# Patient Record
Sex: FEMALE | Race: WHITE | NOT HISPANIC OR LATINO | Employment: FULL TIME | URBAN - NONMETROPOLITAN AREA
[De-identification: names, ages, dates, MRNs, and addresses within clinical notes are randomized per-mention and may not be internally consistent; named-entity substitution may affect disease eponyms.]

---

## 2019-11-08 RX ORDER — LISINOPRIL 10 MG/1
TABLET ORAL
Qty: 180 TABLET | Refills: 3 | Status: SHIPPED | OUTPATIENT
Start: 2019-11-08 | End: 2020-11-02

## 2020-07-21 ENCOUNTER — OUTSIDE FACILITY SERVICE (OUTPATIENT)
Dept: CARDIOLOGY | Facility: CLINIC | Age: 60
End: 2020-07-21

## 2020-07-21 PROCEDURE — 99213 OFFICE O/P EST LOW 20 MIN: CPT | Performed by: INTERNAL MEDICINE

## 2020-07-21 PROCEDURE — 93010 ELECTROCARDIOGRAM REPORT: CPT | Performed by: INTERNAL MEDICINE

## 2020-07-29 ENCOUNTER — TELEPHONE (OUTPATIENT)
Dept: CARDIOLOGY | Facility: CLINIC | Age: 60
End: 2020-07-29

## 2020-09-29 RX ORDER — PYRIDOXINE HCL (VITAMIN B6) 50 MG
TABLET ORAL
COMMUNITY
End: 2020-11-17

## 2020-09-29 RX ORDER — PAROXETINE 10 MG/1
TABLET, FILM COATED ORAL
COMMUNITY
Start: 2019-02-12 | End: 2021-07-16 | Stop reason: DRUGHIGH

## 2020-10-06 ENCOUNTER — OUTSIDE FACILITY SERVICE (OUTPATIENT)
Dept: CARDIOLOGY | Facility: CLINIC | Age: 60
End: 2020-10-06

## 2020-10-06 PROCEDURE — 99213 OFFICE O/P EST LOW 20 MIN: CPT | Performed by: INTERNAL MEDICINE

## 2020-11-02 RX ORDER — LISINOPRIL 10 MG/1
TABLET ORAL
Qty: 180 TABLET | Refills: 3 | Status: SHIPPED | OUTPATIENT
Start: 2020-11-02 | End: 2021-09-29

## 2020-12-23 RX ORDER — HYDROCHLOROTHIAZIDE 12.5 MG/1
TABLET ORAL
Qty: 15 TABLET | Refills: 5 | Status: SHIPPED | OUTPATIENT
Start: 2020-12-23 | End: 2021-06-14

## 2021-01-19 ENCOUNTER — OUTSIDE FACILITY SERVICE (OUTPATIENT)
Dept: CARDIOLOGY | Facility: CLINIC | Age: 61
End: 2021-01-19

## 2021-01-19 PROCEDURE — 99214 OFFICE O/P EST MOD 30 MIN: CPT | Performed by: INTERNAL MEDICINE

## 2021-02-16 ENCOUNTER — LAB REQUISITION (OUTPATIENT)
Dept: LAB | Facility: HOSPITAL | Age: 61
End: 2021-02-16

## 2021-02-16 DIAGNOSIS — C76.50: ICD-10-CM

## 2021-02-16 PROCEDURE — 88305 TISSUE EXAM BY PATHOLOGIST: CPT | Performed by: SURGERY

## 2021-02-17 LAB
LAB AP CASE REPORT: NORMAL
LAB AP DIAGNOSIS COMMENT: NORMAL
PATH REPORT.FINAL DX SPEC: NORMAL
PATH REPORT.GROSS SPEC: NORMAL

## 2021-03-01 RX ORDER — PRAVASTATIN SODIUM 80 MG/1
80 TABLET ORAL DAILY
COMMUNITY
End: 2021-06-14

## 2021-03-01 RX ORDER — PRAVASTATIN SODIUM 40 MG
TABLET ORAL
Qty: 30 TABLET | Refills: 5 | Status: SHIPPED | OUTPATIENT
Start: 2021-03-01 | End: 2021-03-01 | Stop reason: DRUGHIGH

## 2021-03-01 NOTE — TELEPHONE ENCOUNTER
PATIENT WAS TAKEN OFF OF PRAVASTATIN 40MG BACK IN January APPT IN [place]  AND IT WAS INCREASTED TO 80MG

## 2021-06-14 RX ORDER — HYDROCHLOROTHIAZIDE 12.5 MG/1
TABLET ORAL
Qty: 15 TABLET | Refills: 5 | Status: SHIPPED | OUTPATIENT
Start: 2021-06-14 | End: 2021-11-09

## 2021-06-14 RX ORDER — PRAVASTATIN SODIUM 80 MG/1
TABLET ORAL
Qty: 30 TABLET | Refills: 5 | Status: SHIPPED | OUTPATIENT
Start: 2021-06-14 | End: 2021-11-09

## 2021-07-13 ENCOUNTER — OUTSIDE FACILITY SERVICE (OUTPATIENT)
Dept: CARDIOLOGY | Facility: CLINIC | Age: 61
End: 2021-07-13

## 2021-07-13 PROCEDURE — 99214 OFFICE O/P EST MOD 30 MIN: CPT | Performed by: INTERNAL MEDICINE

## 2021-07-16 RX ORDER — PAROXETINE HYDROCHLORIDE 20 MG/1
1 TABLET, FILM COATED ORAL DAILY
COMMUNITY

## 2021-09-29 RX ORDER — LISINOPRIL 10 MG/1
TABLET ORAL
Qty: 180 TABLET | Refills: 3 | Status: SHIPPED | OUTPATIENT
Start: 2021-09-29 | End: 2021-11-09

## 2021-09-29 NOTE — TELEPHONE ENCOUNTER
Rx Refill Note  Requested Prescriptions     Pending Prescriptions Disp Refills   • lisinopril (PRINIVIL,ZESTRIL) 10 MG tablet [Pharmacy Med Name: lisinopril 10 mg tablet] 180 tablet 3     Sig: TAKE ONE TABLET BY MOUTH TWICE DAILY      Last office visit with prescribing clinician: 07/13/21     Next office visit with prescribing clinician: 11/09/2021     SCANNED - LABS (06/26/2021)         Camelia Barreto MA  09/29/21, 08:43 EDT

## 2021-11-09 ENCOUNTER — OUTSIDE FACILITY SERVICE (OUTPATIENT)
Dept: CARDIOLOGY | Facility: CLINIC | Age: 61
End: 2021-11-09

## 2021-11-09 PROCEDURE — 99214 OFFICE O/P EST MOD 30 MIN: CPT | Performed by: INTERNAL MEDICINE

## 2021-11-09 RX ORDER — LISINOPRIL AND HYDROCHLOROTHIAZIDE 12.5; 1 MG/1; MG/1
1 TABLET ORAL DAILY
Qty: 90 TABLET | Refills: 3 | Status: SHIPPED | OUTPATIENT
Start: 2021-11-09 | End: 2021-11-09

## 2021-11-09 RX ORDER — ROSUVASTATIN CALCIUM 20 MG/1
20 TABLET, COATED ORAL DAILY
Qty: 90 TABLET | Refills: 3 | Status: SHIPPED | OUTPATIENT
Start: 2021-11-09 | End: 2021-11-16

## 2021-11-09 RX ORDER — LISINOPRIL AND HYDROCHLOROTHIAZIDE 20; 12.5 MG/1; MG/1
1 TABLET ORAL DAILY
Qty: 90 TABLET | Refills: 3 | Status: SHIPPED | OUTPATIENT
Start: 2021-11-09 | End: 2022-10-03

## 2021-11-10 ENCOUNTER — TRANSCRIBE ORDERS (OUTPATIENT)
Dept: ADMINISTRATIVE | Facility: HOSPITAL | Age: 61
End: 2021-11-10

## 2021-11-10 DIAGNOSIS — Z13.9 SCREENING DUE: Primary | ICD-10-CM

## 2021-11-16 RX ORDER — HYDROCHLOROTHIAZIDE 12.5 MG/1
12.5 CAPSULE, GELATIN COATED ORAL DAILY
COMMUNITY
End: 2022-11-08 | Stop reason: SDUPTHER

## 2021-11-16 RX ORDER — ZINC GLUCONATE 50 MG
50 TABLET ORAL DAILY
COMMUNITY
End: 2022-11-15

## 2021-11-26 ENCOUNTER — HOSPITAL ENCOUNTER (OUTPATIENT)
Dept: CT IMAGING | Facility: HOSPITAL | Age: 61
Discharge: HOME | End: 2021-11-26

## 2021-11-26 ENCOUNTER — HOSPITAL ENCOUNTER (OUTPATIENT)
Dept: CARDIOLOGY | Facility: HOSPITAL | Age: 61
Discharge: HOME | End: 2021-11-26

## 2021-11-26 DIAGNOSIS — Z13.9 SCREENING DUE: ICD-10-CM

## 2021-11-26 LAB
BH CV XLRA MEAS - MID AO DIAM: 1.7 CM
BH CV XLRA MEAS - PAD LEFT ABI PT: 1.2
BH CV XLRA MEAS - PAD LEFT ARM: 111 MMHG
BH CV XLRA MEAS - PAD LEFT LEG PT: 146 MMHG
BH CV XLRA MEAS - PAD RIGHT ABI PT: 1.3
BH CV XLRA MEAS - PAD RIGHT ARM: 122 MMHG
BH CV XLRA MEAS - PAD RIGHT LEG PT: 158 MMHG
BH CV XLRA MEAS LEFT DIST CCA PSV: -90.9 CM/SEC
BH CV XLRA MEAS LEFT ICA/CCA RATIO: 0.6
BH CV XLRA MEAS LEFT MID CCA PSV: 91 CM/SEC
BH CV XLRA MEAS LEFT MID ICA PSV: 55 CM/SEC
BH CV XLRA MEAS LEFT PROX ICA PSV: -55.3 CM/SEC
BH CV XLRA MEAS RIGHT DIST CCA PSV: -67.2 CM/SEC
BH CV XLRA MEAS RIGHT ICA/CCA RATIO: 0.9
BH CV XLRA MEAS RIGHT MID CCA PSV: 67 CM/SEC
BH CV XLRA MEAS RIGHT MID ICA PSV: 60 CM/SEC
BH CV XLRA MEAS RIGHT PROX ICA PSV: -59.7 CM/SEC
MAXIMAL PREDICTED HEART RATE: 161 BPM
STRESS TARGET HR: 137 BPM

## 2021-11-26 PROCEDURE — 75571 CT HRT W/O DYE W/CA TEST: CPT

## 2021-11-26 PROCEDURE — 93799 UNLISTED CV SVC/PROCEDURE: CPT

## 2021-11-29 ENCOUNTER — TELEPHONE (OUTPATIENT)
Dept: CARDIOLOGY | Facility: CLINIC | Age: 61
End: 2021-11-29

## 2021-11-29 NOTE — TELEPHONE ENCOUNTER
Called and notified patient, she verbalized understanding.    --------------------  Royal Gonzales MD Trent, Melissa, MA    Coronary artery calcium score looks okay/very low number

## 2021-11-29 NOTE — TELEPHONE ENCOUNTER
Called and notified patient, she verbalized understanding.    ---------------------    Royal Gonzales MD Trent, Melissa, MA    Mild plaque involving the left internal carotid artery otherwise normal screening vascular ultrasound study

## 2022-01-17 RX ORDER — PRAVASTATIN SODIUM 80 MG/1
80 TABLET ORAL DAILY
Qty: 90 TABLET | Refills: 1 | Status: SHIPPED | OUTPATIENT
Start: 2022-01-17 | End: 2022-06-17

## 2022-06-17 RX ORDER — PRAVASTATIN SODIUM 80 MG/1
TABLET ORAL
Qty: 90 TABLET | Refills: 1 | Status: SHIPPED | OUTPATIENT
Start: 2022-06-17 | End: 2022-11-08 | Stop reason: SDUPTHER

## 2022-10-03 RX ORDER — LISINOPRIL AND HYDROCHLOROTHIAZIDE 20; 12.5 MG/1; MG/1
TABLET ORAL
Qty: 90 TABLET | Refills: 3 | Status: SHIPPED | OUTPATIENT
Start: 2022-10-03 | End: 2022-11-08 | Stop reason: SDUPTHER

## 2022-11-08 ENCOUNTER — OUTSIDE FACILITY SERVICE (OUTPATIENT)
Dept: CARDIOLOGY | Facility: CLINIC | Age: 62
End: 2022-11-08

## 2022-11-08 PROCEDURE — 99214 OFFICE O/P EST MOD 30 MIN: CPT | Performed by: INTERNAL MEDICINE

## 2022-11-08 RX ORDER — HYDROCHLOROTHIAZIDE 12.5 MG/1
12.5 CAPSULE, GELATIN COATED ORAL DAILY
Qty: 90 CAPSULE | Refills: 3 | Status: SHIPPED | OUTPATIENT
Start: 2022-11-08 | End: 2022-11-14

## 2022-11-08 RX ORDER — PRAVASTATIN SODIUM 80 MG/1
80 TABLET ORAL DAILY
Qty: 90 TABLET | Refills: 3 | Status: SHIPPED | OUTPATIENT
Start: 2022-11-08 | End: 2022-12-19

## 2022-11-08 RX ORDER — LISINOPRIL AND HYDROCHLOROTHIAZIDE 20; 12.5 MG/1; MG/1
1 TABLET ORAL DAILY
Qty: 90 TABLET | Refills: 3 | Status: SHIPPED | OUTPATIENT
Start: 2022-11-08

## 2022-11-14 RX ORDER — HYDROCHLOROTHIAZIDE 12.5 MG/1
12.5 CAPSULE, GELATIN COATED ORAL DAILY
Qty: 90 CAPSULE | Refills: 3 | Status: SHIPPED | OUTPATIENT
Start: 2022-11-14 | End: 2022-12-08

## 2022-11-15 RX ORDER — LANOLIN ALCOHOL/MO/W.PET/CERES
1000 CREAM (GRAM) TOPICAL DAILY
COMMUNITY

## 2022-12-08 ENCOUNTER — TELEPHONE (OUTPATIENT)
Dept: CARDIOLOGY | Facility: CLINIC | Age: 62
End: 2022-12-08

## 2022-12-08 NOTE — TELEPHONE ENCOUNTER
PT CALLED IN SAYING SHE JUST RECEIVED A NEW MEDCIATION WITHOUT BEING WARNED ABOUT IT. IT WAS CALLED HYDROCHLOROTHIAZIDE.     PLEASE CALL PT BACK  [phone]

## 2022-12-08 NOTE — TELEPHONE ENCOUNTER
Royal Gonzales MD Trent, Melissa, MA    I already spoke to the patient we dont need to call   We will discontinue hydrochlorothiazide   Continue lisinopril hydrochlorothiazide 20 x 12.5 mg p.o. once a day

## 2022-12-19 RX ORDER — PRAVASTATIN SODIUM 80 MG/1
TABLET ORAL
Qty: 90 TABLET | Refills: 1 | Status: SHIPPED | OUTPATIENT
Start: 2022-12-19

## 2023-06-09 RX ORDER — PRAVASTATIN SODIUM 80 MG/1
TABLET ORAL
Qty: 90 TABLET | Refills: 1 | Status: SHIPPED | OUTPATIENT
Start: 2023-06-09

## 2023-08-09 ENCOUNTER — APPOINTMENT (OUTPATIENT)
Dept: GENERAL RADIOLOGY | Facility: HOSPITAL | Age: 63
DRG: 236 | End: 2023-08-09
Payer: COMMERCIAL

## 2023-08-09 ENCOUNTER — APPOINTMENT (OUTPATIENT)
Dept: RESPIRATORY THERAPY | Facility: HOSPITAL | Age: 63
DRG: 236 | End: 2023-08-09
Payer: COMMERCIAL

## 2023-08-09 ENCOUNTER — APPOINTMENT (OUTPATIENT)
Dept: CARDIOLOGY | Facility: HOSPITAL | Age: 63
DRG: 236 | End: 2023-08-09
Payer: COMMERCIAL

## 2023-08-09 ENCOUNTER — HOSPITAL ENCOUNTER (INPATIENT)
Facility: HOSPITAL | Age: 63
LOS: 6 days | Discharge: HOME | DRG: 236 | End: 2023-08-15
Attending: THORACIC SURGERY (CARDIOTHORACIC VASCULAR SURGERY)
Payer: COMMERCIAL

## 2023-08-09 DIAGNOSIS — I25.110 CORONARY ARTERY DISEASE INVOLVING NATIVE CORONARY ARTERY OF NATIVE HEART WITH UNSTABLE ANGINA PECTORIS: Primary | ICD-10-CM

## 2023-08-09 DIAGNOSIS — J96.01 ACUTE RESPIRATORY FAILURE WITH HYPOXIA: ICD-10-CM

## 2023-08-09 LAB
ABO GROUP BLD: NORMAL
ALBUMIN SERPL-MCNC: 3.9 G/DL (ref 3.5–5.2)
ALBUMIN/GLOB SERPL: 1.5 G/DL
ALP SERPL-CCNC: 83 U/L (ref 39–117)
ALT SERPL W P-5'-P-CCNC: 30 U/L (ref 1–33)
ANION GAP SERPL CALCULATED.3IONS-SCNC: 11 MMOL/L (ref 5–15)
APTT PPP: 28.2 SECONDS (ref 24–31)
APTT PPP: 35.9 SECONDS (ref 61–76.5)
AST SERPL-CCNC: 24 U/L (ref 1–32)
BACTERIA UR QL AUTO: ABNORMAL /HPF
BASOPHILS # BLD AUTO: 0 10*3/MM3 (ref 0–0.2)
BASOPHILS NFR BLD AUTO: 0.2 % (ref 0–1.5)
BH CV XLRA MEAS - DIST GSV CALF DIST LEFT: 0.23 CM
BH CV XLRA MEAS - DIST GSV CALF DIST RIGHT: 0.26 CM
BH CV XLRA MEAS - DIST GSV THIGH DIST LEFT: 0.3 CM
BH CV XLRA MEAS - DIST GSV THIGH DIST RIGHT: 0.33 CM
BH CV XLRA MEAS - GSV ANKLE DIST LEFT: 0.26 CM
BH CV XLRA MEAS - MID GSV CALF LEFT: 0.23 CM
BH CV XLRA MEAS - MID GSV CALF RIGHT: 0.21 CM
BH CV XLRA MEAS - MID GSV THIGH  LEFT: 0.53 CM
BH CV XLRA MEAS - MID GSV THIGH  RIGHT: 0.35 CM
BH CV XLRA MEAS - PROX GSV CALF DIST LEFT: 0.24 CM
BH CV XLRA MEAS - PROX GSV CALF DIST RIGHT: 0.39 CM
BH CV XLRA MEAS - PROX GSV THIGH  LEFT: 0.85 CM
BH CV XLRA MEAS - PROX GSV THIGH  RIGHT: 0.62 CM
BH CV XLRA MEAS LEFT DIST CCA EDV: -17.1 CM/SEC
BH CV XLRA MEAS LEFT DIST CCA PSV: -85.6 CM/SEC
BH CV XLRA MEAS LEFT DIST ICA EDV: -27.8 CM/SEC
BH CV XLRA MEAS LEFT DIST ICA PSV: -82.9 CM/SEC
BH CV XLRA MEAS LEFT ICA/CCA RATIO: 0.97
BH CV XLRA MEAS LEFT PROX CCA EDV: 23.6 CM/SEC
BH CV XLRA MEAS LEFT PROX CCA PSV: 103 CM/SEC
BH CV XLRA MEAS LEFT PROX ECA EDV: -15.2 CM/SEC
BH CV XLRA MEAS LEFT PROX ECA PSV: -76.7 CM/SEC
BH CV XLRA MEAS LEFT PROX ICA EDV: -22.6 CM/SEC
BH CV XLRA MEAS LEFT PROX ICA PSV: -59.9 CM/SEC
BH CV XLRA MEAS LEFT PROX SCLA PSV: -88 CM/SEC
BH CV XLRA MEAS LEFT VERTEBRAL A EDV: -14.1 CM/SEC
BH CV XLRA MEAS LEFT VERTEBRAL A PSV: -47 CM/SEC
BH CV XLRA MEAS RIGHT DIST CCA EDV: -17.9 CM/SEC
BH CV XLRA MEAS RIGHT DIST CCA PSV: -60.2 CM/SEC
BH CV XLRA MEAS RIGHT DIST ICA EDV: -20 CM/SEC
BH CV XLRA MEAS RIGHT DIST ICA PSV: -50.8 CM/SEC
BH CV XLRA MEAS RIGHT ICA/CCA RATIO: 0.87
BH CV XLRA MEAS RIGHT PROX CCA EDV: -16.5 CM/SEC
BH CV XLRA MEAS RIGHT PROX CCA PSV: -80.7 CM/SEC
BH CV XLRA MEAS RIGHT PROX ECA EDV: -14.9 CM/SEC
BH CV XLRA MEAS RIGHT PROX ECA PSV: -69 CM/SEC
BH CV XLRA MEAS RIGHT PROX ICA EDV: -19.7 CM/SEC
BH CV XLRA MEAS RIGHT PROX ICA PSV: -52.3 CM/SEC
BH CV XLRA MEAS RIGHT PROX SCLA PSV: -110 CM/SEC
BH CV XLRA MEAS RIGHT VERTEBRAL A EDV: -12.3 CM/SEC
BH CV XLRA MEAS RIGHT VERTEBRAL A PSV: -39.5 CM/SEC
BILIRUB SERPL-MCNC: 0.5 MG/DL (ref 0–1.2)
BILIRUB UR QL STRIP: NEGATIVE
BLD GP AB SCN SERPL QL: NEGATIVE
BUN SERPL-MCNC: 15 MG/DL (ref 8–23)
BUN/CREAT SERPL: 23.1 (ref 7–25)
CALCIUM SPEC-SCNC: 9.4 MG/DL (ref 8.6–10.5)
CHLORIDE SERPL-SCNC: 106 MMOL/L (ref 98–107)
CLARITY UR: ABNORMAL
CLOSE TME COLL+ADP + EPINEP PNL BLD: 97 % (ref 86–100)
CO2 SERPL-SCNC: 24 MMOL/L (ref 22–29)
COLOR UR: ABNORMAL
CREAT SERPL-MCNC: 0.65 MG/DL (ref 0.57–1)
DEPRECATED RDW RBC AUTO: 43.3 FL (ref 37–54)
EGFRCR SERPLBLD CKD-EPI 2021: 100.9 ML/MIN/1.73
EOSINOPHIL # BLD AUTO: 0.1 10*3/MM3 (ref 0–0.4)
EOSINOPHIL NFR BLD AUTO: 2.4 % (ref 0.3–6.2)
ERYTHROCYTE [DISTWIDTH] IN BLOOD BY AUTOMATED COUNT: 13.5 % (ref 12.3–15.4)
GLOBULIN UR ELPH-MCNC: 2.6 GM/DL
GLUCOSE SERPL-MCNC: 96 MG/DL (ref 65–99)
GLUCOSE UR STRIP-MCNC: NEGATIVE MG/DL
HCT VFR BLD AUTO: 43.4 % (ref 34–46.6)
HGB BLD-MCNC: 14.6 G/DL (ref 12–15.9)
HGB UR QL STRIP.AUTO: NEGATIVE
HYALINE CASTS UR QL AUTO: ABNORMAL /LPF
INR PPP: 0.94 (ref 0.93–1.1)
KETONES UR QL STRIP: NEGATIVE
LEUKOCYTE ESTERASE UR QL STRIP.AUTO: ABNORMAL
LYMPHOCYTES # BLD AUTO: 2 10*3/MM3 (ref 0.7–3.1)
LYMPHOCYTES NFR BLD AUTO: 33.5 % (ref 19.6–45.3)
MCH RBC QN AUTO: 30.8 PG (ref 26.6–33)
MCHC RBC AUTO-ENTMCNC: 33.6 G/DL (ref 31.5–35.7)
MCV RBC AUTO: 91.8 FL (ref 79–97)
MONOCYTES # BLD AUTO: 0.5 10*3/MM3 (ref 0.1–0.9)
MONOCYTES NFR BLD AUTO: 8.7 % (ref 5–12)
MRSA DNA SPEC QL NAA+PROBE: NORMAL
NEUTROPHILS NFR BLD AUTO: 3.3 10*3/MM3 (ref 1.7–7)
NEUTROPHILS NFR BLD AUTO: 55.2 % (ref 42.7–76)
NITRITE UR QL STRIP: NEGATIVE
NRBC BLD AUTO-RTO: 0.1 /100 WBC (ref 0–0.2)
PH UR STRIP.AUTO: 7 [PH] (ref 5–8)
PLATELET # BLD AUTO: 187 10*3/MM3 (ref 140–450)
PMV BLD AUTO: 8.1 FL (ref 6–12)
POTASSIUM SERPL-SCNC: 3.8 MMOL/L (ref 3.5–5.2)
PROT SERPL-MCNC: 6.5 G/DL (ref 6–8.5)
PROT UR QL STRIP: NEGATIVE
PROTHROMBIN TIME: 10.1 SECONDS (ref 9.6–11.7)
RBC # BLD AUTO: 4.73 10*6/MM3 (ref 3.77–5.28)
RBC # UR STRIP: ABNORMAL /HPF
REF LAB TEST METHOD: ABNORMAL
RH BLD: POSITIVE
RIGHT ARM BP: NORMAL MMHG
SARS-COV-2 RNA RESP QL NAA+PROBE: NOT DETECTED
SODIUM SERPL-SCNC: 141 MMOL/L (ref 136–145)
SP GR UR STRIP: 1.01 (ref 1–1.03)
SQUAMOUS #/AREA URNS HPF: ABNORMAL /HPF
T&S EXPIRATION DATE: NORMAL
UROBILINOGEN UR QL STRIP: ABNORMAL
WBC # UR STRIP: ABNORMAL /HPF
WBC NRBC COR # BLD: 6 10*3/MM3 (ref 3.4–10.8)

## 2023-08-09 PROCEDURE — 85576 BLOOD PLATELET AGGREGATION: CPT

## 2023-08-09 PROCEDURE — 94729 DIFFUSING CAPACITY: CPT

## 2023-08-09 PROCEDURE — 80053 COMPREHEN METABOLIC PANEL: CPT

## 2023-08-09 PROCEDURE — 86901 BLOOD TYPING SEROLOGIC RH(D): CPT

## 2023-08-09 PROCEDURE — 71045 X-RAY EXAM CHEST 1 VIEW: CPT

## 2023-08-09 PROCEDURE — 86900 BLOOD TYPING SEROLOGIC ABO: CPT

## 2023-08-09 PROCEDURE — 85730 THROMBOPLASTIN TIME PARTIAL: CPT

## 2023-08-09 PROCEDURE — 93970 EXTREMITY STUDY: CPT

## 2023-08-09 PROCEDURE — 93880 EXTRACRANIAL BILAT STUDY: CPT

## 2023-08-09 PROCEDURE — 86850 RBC ANTIBODY SCREEN: CPT

## 2023-08-09 PROCEDURE — 25010000002 HEPARIN (PORCINE) 25000-0.45 UT/250ML-% SOLUTION

## 2023-08-09 PROCEDURE — 86923 COMPATIBILITY TEST ELECTRIC: CPT

## 2023-08-09 PROCEDURE — 87635 SARS-COV-2 COVID-19 AMP PRB: CPT

## 2023-08-09 PROCEDURE — 83036 HEMOGLOBIN GLYCOSYLATED A1C: CPT

## 2023-08-09 PROCEDURE — 87641 MR-STAPH DNA AMP PROBE: CPT

## 2023-08-09 PROCEDURE — 94727 GAS DIL/WSHOT DETER LNG VOL: CPT

## 2023-08-09 PROCEDURE — 81001 URINALYSIS AUTO W/SCOPE: CPT

## 2023-08-09 PROCEDURE — 85610 PROTHROMBIN TIME: CPT

## 2023-08-09 PROCEDURE — 93005 ELECTROCARDIOGRAM TRACING: CPT

## 2023-08-09 PROCEDURE — 94010 BREATHING CAPACITY TEST: CPT

## 2023-08-09 PROCEDURE — 85025 COMPLETE CBC W/AUTO DIFF WBC: CPT

## 2023-08-09 RX ORDER — CHLORAL HYDRATE 500 MG
1000 CAPSULE ORAL NIGHTLY
COMMUNITY

## 2023-08-09 RX ORDER — SODIUM CHLORIDE 9 MG/ML
30 INJECTION, SOLUTION INTRAVENOUS CONTINUOUS PRN
Status: DISCONTINUED | OUTPATIENT
Start: 2023-08-09 | End: 2023-08-11

## 2023-08-09 RX ORDER — PAROXETINE HYDROCHLORIDE 20 MG/1
20 TABLET, FILM COATED ORAL DAILY
Status: DISCONTINUED | OUTPATIENT
Start: 2023-08-09 | End: 2023-08-11

## 2023-08-09 RX ORDER — CHLORHEXIDINE GLUCONATE 0.12 MG/ML
15 RINSE ORAL EVERY 12 HOURS SCHEDULED
Status: DISCONTINUED | OUTPATIENT
Start: 2023-08-09 | End: 2023-08-11

## 2023-08-09 RX ORDER — ALPRAZOLAM 0.25 MG/1
0.25 TABLET ORAL EVERY 8 HOURS PRN
Status: DISCONTINUED | OUTPATIENT
Start: 2023-08-09 | End: 2023-08-11

## 2023-08-09 RX ORDER — ONDANSETRON 2 MG/ML
4 INJECTION INTRAMUSCULAR; INTRAVENOUS EVERY 6 HOURS PRN
Status: DISCONTINUED | OUTPATIENT
Start: 2023-08-09 | End: 2023-08-11

## 2023-08-09 RX ORDER — DEXTROSE MONOHYDRATE 25 G/50ML
10-50 INJECTION, SOLUTION INTRAVENOUS
Status: DISCONTINUED | OUTPATIENT
Start: 2023-08-09 | End: 2023-08-11 | Stop reason: HOSPADM

## 2023-08-09 RX ORDER — ASPIRIN 81 MG/1
81 TABLET ORAL DAILY
Status: DISCONTINUED | OUTPATIENT
Start: 2023-08-10 | End: 2023-08-11

## 2023-08-09 RX ORDER — HEPARIN SODIUM 10000 [USP'U]/100ML
12 INJECTION, SOLUTION INTRAVENOUS
Status: DISCONTINUED | OUTPATIENT
Start: 2023-08-09 | End: 2023-08-11

## 2023-08-09 RX ORDER — CHLORHEXIDINE GLUCONATE 500 MG/1
1 CLOTH TOPICAL EVERY 12 HOURS
Status: DISCONTINUED | OUTPATIENT
Start: 2023-08-09 | End: 2023-08-09

## 2023-08-09 RX ORDER — CHLORHEXIDINE GLUCONATE 500 MG/1
1 CLOTH TOPICAL EVERY 12 HOURS
Status: DISCONTINUED | OUTPATIENT
Start: 2023-08-09 | End: 2023-08-11

## 2023-08-09 RX ORDER — ATORVASTATIN CALCIUM 40 MG/1
40 TABLET, FILM COATED ORAL NIGHTLY
Status: DISCONTINUED | OUTPATIENT
Start: 2023-08-09 | End: 2023-08-11

## 2023-08-09 RX ORDER — ACETAMINOPHEN 325 MG/1
650 TABLET ORAL EVERY 6 HOURS PRN
Status: DISCONTINUED | OUTPATIENT
Start: 2023-08-09 | End: 2023-08-11

## 2023-08-09 RX ORDER — NICOTINE POLACRILEX 4 MG
15 LOZENGE BUCCAL
Status: DISCONTINUED | OUTPATIENT
Start: 2023-08-09 | End: 2023-08-11 | Stop reason: HOSPADM

## 2023-08-09 RX ORDER — MORPHINE SULFATE 2 MG/ML
2 INJECTION, SOLUTION INTRAMUSCULAR; INTRAVENOUS
Status: DISCONTINUED | OUTPATIENT
Start: 2023-08-09 | End: 2023-08-11

## 2023-08-09 RX ORDER — CHLORHEXIDINE GLUCONATE 0.12 MG/ML
15 RINSE ORAL EVERY 12 HOURS SCHEDULED
Status: DISCONTINUED | OUTPATIENT
Start: 2023-08-09 | End: 2023-08-09

## 2023-08-09 RX ORDER — SODIUM CHLORIDE 0.9 % (FLUSH) 0.9 %
30 SYRINGE (ML) INJECTION ONCE AS NEEDED
Status: DISCONTINUED | OUTPATIENT
Start: 2023-08-09 | End: 2023-08-11 | Stop reason: HOSPADM

## 2023-08-09 RX ORDER — IBUPROFEN 600 MG/1
1 TABLET ORAL
Status: DISCONTINUED | OUTPATIENT
Start: 2023-08-09 | End: 2023-08-11 | Stop reason: HOSPADM

## 2023-08-09 RX ADMIN — ATORVASTATIN CALCIUM 40 MG: 40 TABLET, FILM COATED ORAL at 20:13

## 2023-08-09 RX ADMIN — HEPARIN SODIUM 12 UNITS/KG/HR: 10000 INJECTION, SOLUTION INTRAVENOUS at 15:56

## 2023-08-09 RX ADMIN — PAROXETINE HYDROCHLORIDE 20 MG: 20 TABLET, FILM COATED ORAL at 17:31

## 2023-08-09 NOTE — PROGRESS NOTES
Plans for CABG Friday morning 0730  Pt and family agreeable  PFTs ordered  Dr. Clements met with pt/family.

## 2023-08-09 NOTE — H&P
Name: Sara Chaves ADMIT: 2023   : 1960  PCP: Provider, No Known    MRN: [id number] LOS: 0 days   AGE/SEX: 62 y.o. female  ROOM: /     Chief complaint: Chest pain; MV disease; preop     Subjective : Denies chest pain    HPI:  Patient is a 62 y.o. female presents today from [facility] where she presented with chest discomfort progressively worsening over the past several weeks. Further history includes HTN, HLD, and tobacco abuse. She denies other medical problems. She had a severe episode of chest pain with associated diaphoresis, nausea, and vomiting which brought her to the emergency department.  Today she underwent LHC and was found to have severe three-vessel CAD with moderate LAD stenosis with IFR of the LAD of 0.87, D1 with 90% stenosis, LCX with 100% occluded in the mid segment with left to left collaterals filling the OMs, 70% mRCA and 99% dRCA stenosis. She had an echocardiogram which showed preserved LV function with an LVEF of 60% with grade 1 diastolic dysfunction and no evidence of valvular dysfunction. She was referred to CTS by Dr. Salcido and brought to [facility] for further workup and management.     No past medical history on file.  No past surgical history on file.  No family history on file.     Medications Prior to Admission   Medication Sig Dispense Refill Last Dose    ASPIRIN 81 PO Daily.       lisinopril-hydrochlorothiazide (PRINZIDE,ZESTORETIC) 20-12.5 MG per tablet Take 1 tablet by mouth Daily. 90 tablet 3     PARoxetine (PAXIL) 20 MG tablet Take 1 tablet by mouth Daily.       pravastatin (PRAVACHOL) 80 MG tablet TAKE ONE TABLET BY MOUTH EVERY DAY 90 tablet 1     vitamin B-12 (CYANOCOBALAMIN) 1000 MCG tablet Take 1 tablet by mouth Daily.        Allergies:  Patient has no allergy information on record.    Review of Systems   Constitutional:  Positive for activity change.   HENT:  Negative for dental problem.    Respiratory:  Positive for chest tightness and shortness of  breath.    Cardiovascular:  Positive for chest pain. Negative for palpitations.   Gastrointestinal: Negative.    Genitourinary: Negative.    Neurological:  Positive for dizziness.   Psychiatric/Behavioral:  The patient is nervous/anxious.       Objective    Vital Signs  Temp:  [97.5 øF (36.4 øC)] 97.5 øF (36.4 øC)  Heart Rate:  [61] 61  Resp:  [18] 18  BP: (135)/(82) 135/82  SpO2:  [97 %] 97 %  on   ;   Device (Oxygen Therapy): room air  Body mass index is 33.47 kg/mý.    Physical Exam  Constitutional:       Appearance: She is obese.   HENT:      Head: Normocephalic.      Mouth/Throat:      Mouth: Mucous membranes are moist. Mucous membranes are dry.   Eyes:      Pupils: Pupils are equal, round, and reactive to light.   Cardiovascular:      Rate and Rhythm: Normal rate and regular rhythm.   Abdominal:      General: Bowel sounds are normal. There is no distension.   Musculoskeletal:         General: No swelling. Normal range of motion.   Skin:     General: Skin is warm and dry.      Capillary Refill: Capillary refill takes 2 to 3 seconds.   Neurological:      Mental Status: She is alert and oriented to person, place, and time.   Psychiatric:         Mood and Affect: Mood normal.     Results Review:  I reviewed the patient's new clinical results.    Assessment & Plan    -NSTEMI with severe MV CAD--heparin gtt ordered  -HTN--on lisinopirl/hctz PTA--holding  -HLD--ASA/statin  -Tobacco abuse--PFTs ordered     Dr. Clements will review the films and provide his surgical recommendations. Questions answered with verbalized understanding. Preop studies ordered. She has not taken her vitamin d in months and has not had fish oil for 3 days. Hold heparin gtt on call to OR.    I discussed the patients findings and my recommendations with patient and family.    Zayda De Santiago, CELY  08/09/23  14:16 EDT

## 2023-08-10 ENCOUNTER — ANESTHESIA EVENT (OUTPATIENT)
Dept: PERIOP | Facility: HOSPITAL | Age: 63
DRG: 236 | End: 2023-08-10
Payer: COMMERCIAL

## 2023-08-10 LAB
APTT PPP: 46 SECONDS (ref 61–76.5)
APTT PPP: 51.6 SECONDS (ref 61–76.5)
APTT PPP: 52.7 SECONDS (ref 61–76.5)
ARTERIAL PATENCY WRIST A: POSITIVE
ATMOSPHERIC PRESS: ABNORMAL MM[HG]
BASE EXCESS BLDA CALC-SCNC: 1.1 MMOL/L (ref 0–3)
BDY SITE: ABNORMAL
CO2 BLDA-SCNC: 27.7 MMOL/L (ref 22–29)
HBA1C MFR BLD: 5.1 % (ref 4.8–5.6)
HCO3 BLDA-SCNC: 26.4 MMOL/L (ref 21–28)
HEMODILUTION: NO
INHALED O2 CONCENTRATION: 21 %
MODALITY: ABNORMAL
PCO2 BLDA: 43.3 MM HG (ref 35–48)
PH BLDA: 7.39 PH UNITS (ref 7.35–7.45)
PO2 BLDA: 75.3 MM HG (ref 83–108)
SAO2 % BLDCOA: 94.8 % (ref 94–98)

## 2023-08-10 PROCEDURE — 33533 CABG ARTERIAL SINGLE: CPT | Performed by: SPECIALIST/TECHNOLOGIST, OTHER

## 2023-08-10 PROCEDURE — 36600 WITHDRAWAL OF ARTERIAL BLOOD: CPT

## 2023-08-10 PROCEDURE — 99024 POSTOP FOLLOW-UP VISIT: CPT

## 2023-08-10 PROCEDURE — 85730 THROMBOPLASTIN TIME PARTIAL: CPT

## 2023-08-10 PROCEDURE — 33508 ENDOSCOPIC VEIN HARVEST: CPT | Performed by: SPECIALIST/TECHNOLOGIST, OTHER

## 2023-08-10 PROCEDURE — 93010 ELECTROCARDIOGRAM REPORT: CPT | Performed by: INTERNAL MEDICINE

## 2023-08-10 PROCEDURE — 82803 BLOOD GASES ANY COMBINATION: CPT

## 2023-08-10 PROCEDURE — 33519 CABG ARTERY-VEIN THREE: CPT

## 2023-08-10 PROCEDURE — 33508 ENDOSCOPIC VEIN HARVEST: CPT

## 2023-08-10 PROCEDURE — 33519 CABG ARTERY-VEIN THREE: CPT | Performed by: SPECIALIST/TECHNOLOGIST, OTHER

## 2023-08-10 PROCEDURE — 25010000002 HEPARIN (PORCINE) 25000-0.45 UT/250ML-% SOLUTION

## 2023-08-10 RX ORDER — CHOLECALCIFEROL (VITAMIN D3) 125 MCG
5 CAPSULE ORAL NIGHTLY PRN
Status: DISCONTINUED | OUTPATIENT
Start: 2023-08-10 | End: 2023-08-11

## 2023-08-10 RX ADMIN — MUPIROCIN 1 APPLICATION: 20 OINTMENT TOPICAL at 18:33

## 2023-08-10 RX ADMIN — ATORVASTATIN CALCIUM 40 MG: 40 TABLET, FILM COATED ORAL at 20:52

## 2023-08-10 RX ADMIN — Medication 12.5 MG: at 10:49

## 2023-08-10 RX ADMIN — MUPIROCIN 1 APPLICATION: 20 OINTMENT TOPICAL at 10:49

## 2023-08-10 RX ADMIN — ASPIRIN 81 MG: 81 TABLET, COATED ORAL at 10:50

## 2023-08-10 RX ADMIN — HEPARIN SODIUM 16 UNITS/KG/HR: 10000 INJECTION, SOLUTION INTRAVENOUS at 13:24

## 2023-08-10 RX ADMIN — CHLORHEXIDINE GLUCONATE 15 ML: 1.2 SOLUTION ORAL at 10:49

## 2023-08-10 RX ADMIN — MUPIROCIN 1 APPLICATION: 20 OINTMENT TOPICAL at 21:27

## 2023-08-10 RX ADMIN — PAROXETINE HYDROCHLORIDE 20 MG: 20 TABLET, FILM COATED ORAL at 10:50

## 2023-08-10 RX ADMIN — ACETAMINOPHEN 650 MG: 325 TABLET, FILM COATED ORAL at 00:10

## 2023-08-10 RX ADMIN — ALPRAZOLAM 0.25 MG: 0.25 TABLET ORAL at 22:24

## 2023-08-10 RX ADMIN — Medication 5 MG: at 22:24

## 2023-08-10 RX ADMIN — CHLORHEXIDINE GLUCONATE 15 ML: 1.2 SOLUTION ORAL at 18:33

## 2023-08-10 RX ADMIN — Medication 12.5 MG: at 20:52

## 2023-08-10 RX ADMIN — CHLORHEXIDINE GLUCONATE 15 ML: 1.2 SOLUTION ORAL at 20:52

## 2023-08-10 NOTE — ANESTHESIA PREPROCEDURE EVALUATION
Anesthesia Evaluation     NPO Solid Status: > 8 hours             Airway   Mallampati: I  TM distance: >3 FB  Neck ROM: full  No difficulty expected  Dental - normal exam     Pulmonary - normal exam   (+) a smoker Current Abstained day of surgery,  Cardiovascular - normal exam    (+) hypertension, CADangina, hyperlipidemia      Neuro/Psych  GI/Hepatic/Renal/Endo      Musculoskeletal     Abdominal  - normal exam    Bowel sounds: normal.   Substance History      OB/GYN          Other        ROS/Med Hx Other: CAROTIDS <50 BILAT  MULTIVESSEL CAD  EF 55-60                Anesthesia Plan    ASA 4     general, Ellen, CVL and PAC     intravenous induction   Postoperative Plan: Expected vent after surgery  Anesthetic plan, risks, benefits, and alternatives have been provided, discussed and informed consent has been obtained with: patient.  Pre-procedure education provided  Use of blood products discussed with patient  Consented to blood products.      CODE STATUS:    Code Status (Patient has no pulse and is not breathing): CPR (Attempt to Resuscitate)  Medical Interventions (Patient has pulse or is breathing): Full Support

## 2023-08-10 NOTE — PROGRESS NOTES
" LOS: 1 day   Patient Care Team:  Provider, No Known as PCP - Jacob Webber MD as Consulting Physician (Cardiology)    Chief Complaint: Preop     Subjective:  Symptoms:  Stable.  No shortness of breath or chest pain.    Diet:  Adequate intake.  No nausea or vomiting.    Activity level: Normal.    Pain:  She reports no pain.      Vital Signs  Temp:  [97.5 øF (36.4 øC)-98.3 øF (36.8 øC)] 98 øF (36.7 øC)  Heart Rate:  [52-90] 64  Resp:  [16-18] 16  BP: (128-160)/(70-97) 141/88  Body mass index is 33.15 kg/mý.    Intake/Output Summary (Last 24 hours) at 8/10/2023 0852  Last data filed at 8/10/2023 0704  Gross per 24 hour   Intake 298 ml   Output 800 ml   Net -502 ml     I/O this shift:  In: -   Out: 300 [Urine:300]          08/09/23  1317 08/10/23  0703   Weight: 83 kg (183 lb) 82.2 kg (181 lb 3.5 oz)       Objective:  General Appearance:  Comfortable and in no acute distress.    Vital signs: (most recent): Blood pressure 141/88, pulse 64, temperature 98 øF (36.7 øC), temperature source Oral, resp. rate 16, height 157.5 cm (62\"), weight 82.2 kg (181 lb 3.5 oz), SpO2 96 %.  Vital signs are normal.  No fever.    Output: Producing urine and no stool output.    HEENT: Normal HEENT exam.    Lungs:  Normal effort and normal respiratory rate.  Breath sounds clear to auscultation.  She is not in respiratory distress.    Heart: Normal rate.  Regular rhythm.    Abdomen: Abdomen is soft.  Bowel sounds are normal.   There is no abdominal tenderness.     Extremities: Normal range of motion.    Pulses: Distal pulses are intact.    Neurological: Patient is alert and oriented to person, place and time.    Pupils:  Pupils are equal, round, and reactive to light.    Skin:  Warm and dry.              Results Review:        WBC WBC   Date Value Ref Range Status   08/09/2023 6.00 3.40 - 10.80 10*3/mm3 Final      HGB Hemoglobin   Date Value Ref Range Status   08/09/2023 14.6 12.0 - 15.9 g/dL Final      HCT Hematocrit "   Date Value Ref Range Status   08/09/2023 43.4 34.0 - 46.6 % Final      Platelets Platelets   Date Value Ref Range Status   08/09/2023 187 140 - 450 10*3/mm3 Final        PT/INR:    Protime   Date Value Ref Range Status   08/09/2023 10.1 9.6 - 11.7 Seconds Final   /  INR   Date Value Ref Range Status   08/09/2023 0.94 0.93 - 1.10 Final       Sodium Sodium   Date Value Ref Range Status   08/09/2023 141 136 - 145 mmol/L Final      Potassium Potassium   Date Value Ref Range Status   08/09/2023 3.8 3.5 - 5.2 mmol/L Final      Chloride Chloride   Date Value Ref Range Status   08/09/2023 106 98 - 107 mmol/L Final      Bicarbonate CO2   Date Value Ref Range Status   08/09/2023 24.0 22.0 - 29.0 mmol/L Final      BUN BUN   Date Value Ref Range Status   08/09/2023 15 8 - 23 mg/dL Final      Creatinine Creatinine   Date Value Ref Range Status   08/09/2023 0.65 0.57 - 1.00 mg/dL Final      Calcium Calcium   Date Value Ref Range Status   08/09/2023 9.4 8.6 - 10.5 mg/dL Final      Magnesium No results found for: MG       aspirin, 81 mg, Oral, Daily  atorvastatin, 40 mg, Oral, Nightly  [START ON 8/11/2023] ceFAZolin, 2,000 mg, Intravenous, Once  chlorhexidine, 15 mL, Mouth/Throat, Q12H  Chlorhexidine Gluconate Cloth, 1 application , Topical, Q12H  metoprolol tartrate, 12.5 mg, Oral, Once  mupirocin, 1 application , Each Nare, Q12H  PARoxetine, 20 mg, Oral, Daily      heparin, 12 Units/kg/hr, Last Rate: 16 Units/kg/hr (08/10/23 0655)  [START ON 8/11/2023] insulin, 0-100 Units/hr  sodium chloride, 30 mL/hr        Assessment & Plan    -NSTEMI with severe MV CAD--on heparin drip  -HTN--on lisinopirl/hctz PTA--holding  -HLD--ASA/statin  -Tobacco abuse--encouraged cessation    Dr. Clements has reviewed the case and recommends surgical revascularization with CABG.  She is scheduled for first case tomorrow morning with Dr. Clements.  Preop testing in progress carotid US without significant stenosis, vein mapping is adequate,  urine/COVID/MRSA are negative. Plt agg is 97%. PFTs are pending.  I am going to go ahead and start her on low-dose beta-blocker. Hold heparin gtt on call to OR.     Zayda De Santiago, CELY  08/10/23  08:52 EDT

## 2023-08-10 NOTE — NURSING NOTE
The patient was A&O x4 in the morning; however, throughout the day she became more confused and occasionally said things that did not make any sense. This evening when she got up out of bed she could barely put one foot in front of the other to walk to the restroom, but did fine returning. The CV surgery NPs and her surgeon were made aware throughout the day. An ABG was checked and it resulted okay.

## 2023-08-10 NOTE — CASE MANAGEMENT/SOCIAL WORK
Discharge Planning Assessment  [facility]     Patient Name: Sara Chaves  MRN: [id number]  Today's Date: 8/10/2023    Admit Date: 8/9/2023    Plan: DC Plan: From home with spouse. CABG 8/11, PT/OT evals post surgery. Pt will need PCP at DC.   Discharge Needs Assessment       Row Name 08/10/23 1246       Living Environment    People in Home spouse    Name(s) of People in Home Vinayak    Current Living Arrangements home    Potentially Unsafe Housing Conditions none    Primary Care Provided by self    Provides Primary Care For no one    Family Caregiver if Needed spouse    Quality of Family Relationships unable to assess    Able to Return to Prior Arrangements yes       Resource/Environmental Concerns    Resource/Environmental Concerns none    Transportation Concerns none       Transition Planning    Patient/Family Anticipates Transition to home with family    Patient/Family Anticipated Services at Transition none    Transportation Anticipated family or friend will provide       Discharge Needs Assessment    Readmission Within the Last 30 Days no previous admission in last 30 days    Equipment Currently Used at Home none    Concerns to be Addressed discharge planning    Concerns Comments No PCP    Anticipated Changes Related to Illness none    Discharge Coordination/Progress DC Plan: Return home with spouse. CABG 8/11, PT/OT evals post surgery. Pt will need PCP at DC.                   Discharge Plan       Row Name 08/10/23 1251       Plan    Plan DC Plan: From home with spouse. CABG 8/11, PT/OT evals post surgery. Pt will need PCP at DC.    Plan Comments Note pt lives in [facility], and insurance is [employer]. Lists given to pt for home health, ECF, acute inpt rehab, outpt rehab, DME. Pt confirms insurance and no PCP.  Requests Meds to Beds.                  Continued Care and Services - Admitted Since 8/9/2023    Coordination has not been started for this encounter.          Demographic Summary       Row Name 08/10/23  1245       General Information    Admission Type inpatient    Arrived From home    Referral Source admission list    Reason for Consult discharge planning    Preferred Language English    General Information Comments Spoke to pt in room.                   Functional Status       Row Name 08/10/23 1247       Functional Status    Usual Activity Tolerance good    Current Activity Tolerance moderate       Functional Status, IADL    Medications independent    Meal Preparation independent    Housekeeping independent    Laundry independent    Shopping independent       Mental Status    General Appearance WDL WDL       Mental Status Summary    Recent Changes in Mental Status/Cognitive Functioning no changes                  Met with patient in room.      Maintained distance greater than six feet and spent less than 15 minutes in the room.                Evy Goel, RN

## 2023-08-10 NOTE — PAYOR COMM NOTE
"This is clinical update for Sara Chaves   Reference/Auth#: [id number]    AUTHORIZATION PENDING:     Please call or fax determination to contact below.   Thank you.    Yady Riggins RN, BSN  Utilization Review Nurse  [facility]  Direct & confidential phone # [phone]  Fax # [phone]      Sara Chaves (62 y.o. Female)       Date of Birth   1960    Social Security Number       Address   [address] [place] [address] [place] [facility]    Home Phone   [phone]    MRN   [id number]       Jain   None    Marital Status                               Admission Date   8/9/23    Admission Type   Elective    Admitting Provider   Noe Clements MD    Attending Provider   Mickey Plata MD    Department, Room/Bed   [facility] CARDIOVASCULAR CARE UNIT, 2202/1       Discharge Date       Discharge Disposition       Discharge Destination                                 Attending Provider: Mickey Plata MD    Allergies: No Known Allergies    Isolation: None   Infection: None   Code Status: CPR    Ht: 157.5 cm (62\")   Wt: 82.2 kg (181 lb 3.5 oz)    Admission Cmt: None   Principal Problem: Coronary artery disease involving native coronary artery of native heart with unstable angina pectoris [I25.110]                   Active Insurance as of 8/9/2023       Primary Coverage       Payor Plan Insurance Group Employer/Plan Group    AETNA COMMERCIAL AETNA [id number]       Payor Plan Address Payor Plan Phone Number Payor Plan Fax Number Effective Dates    [address] [phone]  4/1/2018 - None Entered    [facility] [id number]         Subscriber Name Subscriber Birth Date Member ID       KATE CHAVES [birth year]/10/1960 [id number]                     Emergency Contacts        (Rel.) Home Phone Work Phone Mobile Phone    RAYMUNDONATHALIAKATE (Spouse) -- -- [phone]                 History & Physical        Jonnathan, CELY Kent at 08/09/23 1338       " Attestation signed by Noe Clements MD at 23 1600    I have reviewed this documentation and agree.                    Name: Sara Chaves ADMIT: 2023   : 1960  PCP: Provider, No Known    MRN: [id number] LOS: 0 days   AGE/SEX: 62 y.o. female  ROOM: [facility]/     Chief complaint: Chest pain; MV disease; preop     Subjective: Denies chest pain    HPI:  Patient is a 62 y.o. female presents today from [facility] where she presented with chest discomfort progressively worsening over the past several weeks. Further history includes HTN, HLD, and tobacco abuse. She denies other medical problems. She had a severe episode of chest pain with associated diaphoresis, nausea, and vomiting which brought her to the emergency department.  Today she underwent LHC and was found to have severe three-vessel CAD with moderate LAD stenosis with IFR of the LAD of 0.87, D1 with 90% stenosis, LCX with 100% occluded in the mid segment with left to left collaterals filling the OMs, 70% mRCA and 99% dRCA stenosis. She had an echocardiogram which showed preserved LV function with an LVEF of 60% with grade 1 diastolic dysfunction and no evidence of valvular dysfunction. She was referred to CTS by Dr. Salcido and brought to [facility] for further workup and management.     No past medical history on file.  No past surgical history on file.  No family history on file.     Medications Prior to Admission   Medication Sig Dispense Refill Last Dose    ASPIRIN 81 PO Daily.       lisinopril-hydrochlorothiazide (PRINZIDE,ZESTORETIC) 20-12.5 MG per tablet Take 1 tablet by mouth Daily. 90 tablet 3     PARoxetine (PAXIL) 20 MG tablet Take 1 tablet by mouth Daily.       pravastatin (PRAVACHOL) 80 MG tablet TAKE ONE TABLET BY MOUTH EVERY DAY 90 tablet 1     vitamin B-12 (CYANOCOBALAMIN) 1000 MCG tablet Take 1 tablet by mouth Daily.        Allergies:  Patient has no allergy information on record.    Review of Systems   Constitutional:   Positive for activity change.   HENT:  Negative for dental problem.    Respiratory:  Positive for chest tightness and shortness of breath.    Cardiovascular:  Positive for chest pain. Negative for palpitations.   Gastrointestinal: Negative.    Genitourinary: Negative.    Neurological:  Positive for dizziness.   Psychiatric/Behavioral:  The patient is nervous/anxious.       Objective   Vital Signs  Temp:  [97.5 øF (36.4 øC)] 97.5 øF (36.4 øC)  Heart Rate:  [61] 61  Resp:  [18] 18  BP: (135)/(82) 135/82  SpO2:  [97 %] 97 %  on   ;   Device (Oxygen Therapy): room air  Body mass index is 33.47 kg/mý.    Physical Exam  Constitutional:       Appearance: She is obese.   HENT:      Head: Normocephalic.      Mouth/Throat:      Mouth: Mucous membranes are moist. Mucous membranes are dry.   Eyes:      Pupils: Pupils are equal, round, and reactive to light.   Cardiovascular:      Rate and Rhythm: Normal rate and regular rhythm.   Abdominal:      General: Bowel sounds are normal. There is no distension.   Musculoskeletal:         General: No swelling. Normal range of motion.   Skin:     General: Skin is warm and dry.      Capillary Refill: Capillary refill takes 2 to 3 seconds.   Neurological:      Mental Status: She is alert and oriented to person, place, and time.   Psychiatric:         Mood and Affect: Mood normal.     Results Review:  I reviewed the patient's new clinical results.    Assessment & Plan    -NSTEMI with severe MV CAD--heparin gtt ordered  -HTN--on lisinopirl/hctz PTA--holding  -HLD--ASA/statin  -Tobacco abuse--PFTs ordered     Dr. Clements will review the films and provide his surgical recommendations. Questions answered with verbalized understanding. Preop studies ordered. She has not taken her vitamin d in months and has not had fish oil for 3 days. Hold heparin gtt on call to OR.    I discussed the patients findings and my recommendations with patient and family.    Zayda De Santiago, APRN  08/09/23  14:16  "EDT    Electronically signed by Noe Clements MD at 08/09/23 1600       Operative/Procedure Notes (last 48 hours)  Notes from 08/08/23 1125 through 08/10/23 1125   No notes of this type exist for this encounter.          Physician Progress Notes (last 48 hours)        Zayda De SantiagoCELY at 08/10/23 0852           LOS: 1 day   Patient Care Team:  Provider, No Known as PCP - Jacob Webber MD as Consulting Physician (Cardiology)    Chief Complaint: Preop     Subjective:  Symptoms:  Stable.  No shortness of breath or chest pain.    Diet:  Adequate intake.  No nausea or vomiting.    Activity level: Normal.    Pain:  She reports no pain.      Vital Signs  Temp:  [97.5 øF (36.4 øC)-98.3 øF (36.8 øC)] 98 øF (36.7 øC)  Heart Rate:  [52-90] 64  Resp:  [16-18] 16  BP: (128-160)/(70-97) 141/88  Body mass index is 33.15 kg/mý.    Intake/Output Summary (Last 24 hours) at 8/10/2023 0852  Last data filed at 8/10/2023 0704  Gross per 24 hour   Intake 298 ml   Output 800 ml   Net -502 ml     I/O this shift:  In: -   Out: 300 [Urine:300]          08/09/23  1317 08/10/23  0703   Weight: 83 kg (183 lb) 82.2 kg (181 lb 3.5 oz)       Objective:  General Appearance:  Comfortable and in no acute distress.    Vital signs: (most recent): Blood pressure 141/88, pulse 64, temperature 98 øF (36.7 øC), temperature source Oral, resp. rate 16, height 157.5 cm (62\"), weight 82.2 kg (181 lb 3.5 oz), SpO2 96 %.  Vital signs are normal.  No fever.    Output: Producing urine and no stool output.    HEENT: Normal HEENT exam.    Lungs:  Normal effort and normal respiratory rate.  Breath sounds clear to auscultation.  She is not in respiratory distress.    Heart: Normal rate.  Regular rhythm.    Abdomen: Abdomen is soft.  Bowel sounds are normal.   There is no abdominal tenderness.     Extremities: Normal range of motion.    Pulses: Distal pulses are intact.    Neurological: Patient is alert and oriented to person, place " and time.    Pupils:  Pupils are equal, round, and reactive to light.    Skin:  Warm and dry.              Results Review:        WBC WBC   Date Value Ref Range Status   08/09/2023 6.00 3.40 - 10.80 10*3/mm3 Final      HGB Hemoglobin   Date Value Ref Range Status   08/09/2023 14.6 12.0 - 15.9 g/dL Final      HCT Hematocrit   Date Value Ref Range Status   08/09/2023 43.4 34.0 - 46.6 % Final      Platelets Platelets   Date Value Ref Range Status   08/09/2023 187 140 - 450 10*3/mm3 Final        PT/INR:    Protime   Date Value Ref Range Status   08/09/2023 10.1 9.6 - 11.7 Seconds Final   /  INR   Date Value Ref Range Status   08/09/2023 0.94 0.93 - 1.10 Final       Sodium Sodium   Date Value Ref Range Status   08/09/2023 141 136 - 145 mmol/L Final      Potassium Potassium   Date Value Ref Range Status   08/09/2023 3.8 3.5 - 5.2 mmol/L Final      Chloride Chloride   Date Value Ref Range Status   08/09/2023 106 98 - 107 mmol/L Final      Bicarbonate CO2   Date Value Ref Range Status   08/09/2023 24.0 22.0 - 29.0 mmol/L Final      BUN BUN   Date Value Ref Range Status   08/09/2023 15 8 - 23 mg/dL Final      Creatinine Creatinine   Date Value Ref Range Status   08/09/2023 0.65 0.57 - 1.00 mg/dL Final      Calcium Calcium   Date Value Ref Range Status   08/09/2023 9.4 8.6 - 10.5 mg/dL Final      Magnesium No results found for: MG       aspirin, 81 mg, Oral, Daily  atorvastatin, 40 mg, Oral, Nightly  [START ON 8/11/2023] ceFAZolin, 2,000 mg, Intravenous, Once  chlorhexidine, 15 mL, Mouth/Throat, Q12H  Chlorhexidine Gluconate Cloth, 1 application , Topical, Q12H  metoprolol tartrate, 12.5 mg, Oral, Once  mupirocin, 1 application , Each Nare, Q12H  PARoxetine, 20 mg, Oral, Daily      heparin, 12 Units/kg/hr, Last Rate: 16 Units/kg/hr (08/10/23 0655)  [START ON 8/11/2023] insulin, 0-100 Units/hr  sodium chloride, 30 mL/hr        Assessment & Plan    -NSTEMI with severe MV CAD--on heparin drip  -HTN--on lisinopirl/hctz  PTA--holding  -HLD--ASA/statin  -Tobacco abuse--encouraged cessation    Dr. Clements has reviewed the case and recommends surgical revascularization with CABG.  She is scheduled for first case tomorrow morning with Dr. Clements.  Preop testing in progress carotid US without significant stenosis, vein mapping is adequate, urine/COVID/MRSA are negative. Plt agg is 97%. PFTs are pending.  I am going to go ahead and start her on low-dose beta-blocker. Hold heparin gtt on call to OR.     CELY Herrera  08/10/23  08:52 EDT    Electronically signed by Zayda De Santiago APRN at 08/10/23 1027       Ryann Meyer APRN at 08/09/23 1554          Plans for CABG Friday morning 0730  Pt and family agreeable  PFTs ordered  Dr. Clements met with pt/family.    Electronically signed by Ryann Meyer APRN at 08/09/23 1600       Consult Notes (last 48 hours)  Notes from 08/08/23 1125 through 08/10/23 1125   No notes of this type exist for this encounter.

## 2023-08-11 ENCOUNTER — APPOINTMENT (OUTPATIENT)
Dept: GENERAL RADIOLOGY | Facility: HOSPITAL | Age: 63
DRG: 236 | End: 2023-08-11
Payer: COMMERCIAL

## 2023-08-11 ENCOUNTER — OFFICE VISIT (OUTPATIENT)
Dept: PERIOP | Facility: HOSPITAL | Age: 63
DRG: 236 | End: 2023-08-11
Payer: COMMERCIAL

## 2023-08-11 ENCOUNTER — ANESTHESIA (OUTPATIENT)
Dept: PERIOP | Facility: HOSPITAL | Age: 63
DRG: 236 | End: 2023-08-11
Payer: COMMERCIAL

## 2023-08-11 LAB
ACT BLD: 125 SECONDS (ref 89–137)
ACT BLD: 131 SECONDS (ref 89–137)
ACT BLD: 372 SECONDS (ref 89–137)
ACT BLD: 414 SECONDS (ref 89–137)
ACT BLD: 432 SECONDS (ref 89–137)
ACT BLD: 450 SECONDS (ref 89–137)
ALBUMIN SERPL-MCNC: 3.6 G/DL (ref 3.5–5.2)
ALBUMIN SERPL-MCNC: 3.9 G/DL (ref 3.5–5.2)
ALBUMIN SERPL-MCNC: 4.6 G/DL (ref 3.5–5.2)
ALBUMIN/GLOB SERPL: 1.7 G/DL
ALP SERPL-CCNC: 80 U/L (ref 39–117)
ALT SERPL W P-5'-P-CCNC: 40 U/L (ref 1–33)
ANION GAP SERPL CALCULATED.3IONS-SCNC: 10 MMOL/L (ref 5–15)
ANION GAP SERPL CALCULATED.3IONS-SCNC: 9 MMOL/L (ref 5–15)
ANION GAP SERPL CALCULATED.3IONS-SCNC: 9 MMOL/L (ref 5–15)
APTT PPP: 26.2 SECONDS (ref 24–31)
APTT PPP: 58.5 SECONDS (ref 61–76.5)
ARTERIAL PATENCY WRIST A: ABNORMAL
AST SERPL-CCNC: 33 U/L (ref 1–32)
ATMOSPHERIC PRESS: ABNORMAL MM[HG]
BASE DEFICIT: ABNORMAL
BASE EXCESS BLDA CALC-SCNC: -2 MMOL/L (ref 0–3)
BASE EXCESS BLDA CALC-SCNC: -2.1 MMOL/L (ref 0–3)
BASE EXCESS BLDA CALC-SCNC: -2.4 MMOL/L (ref 0–3)
BASE EXCESS BLDA CALC-SCNC: -2.6 MMOL/L (ref 0–3)
BASE EXCESS BLDA CALC-SCNC: <0 MMOL/L (ref 0–3)
BASE EXCESS BLDV CALC-SCNC: ABNORMAL MMOL/L
BASOPHILS # BLD AUTO: 0.1 10*3/MM3 (ref 0–0.2)
BASOPHILS # BLD AUTO: 0.1 10*3/MM3 (ref 0–0.2)
BASOPHILS NFR BLD AUTO: 0.4 % (ref 0–1.5)
BASOPHILS NFR BLD AUTO: 1.2 % (ref 0–1.5)
BDY SITE: ABNORMAL
BH BB BLOOD EXPIRATION DATE: NORMAL
BH BB BLOOD EXPIRATION DATE: NORMAL
BH BB BLOOD TYPE BARCODE: 6200
BH BB BLOOD TYPE BARCODE: 6200
BH BB DISPENSE STATUS: NORMAL
BH BB DISPENSE STATUS: NORMAL
BH BB PRODUCT CODE: NORMAL
BH BB PRODUCT CODE: NORMAL
BH BB UNIT NUMBER: NORMAL
BH BB UNIT NUMBER: NORMAL
BILIRUB SERPL-MCNC: 0.5 MG/DL (ref 0–1.2)
BUN SERPL-MCNC: 11 MG/DL (ref 8–23)
BUN SERPL-MCNC: 12 MG/DL (ref 8–23)
BUN SERPL-MCNC: 15 MG/DL (ref 8–23)
BUN/CREAT SERPL: 18.5 (ref 7–25)
BUN/CREAT SERPL: 18.6 (ref 7–25)
BUN/CREAT SERPL: 22.4 (ref 7–25)
CA-I BLDA-SCNC: 1.03 MMOL/L (ref 1.12–1.32)
CA-I BLDA-SCNC: 1.03 MMOL/L (ref 1.12–1.32)
CA-I BLDA-SCNC: 1.04 MMOL/L (ref 1.12–1.32)
CA-I BLDA-SCNC: 1.06 MMOL/L (ref 1.12–1.32)
CA-I BLDA-SCNC: 1.15 MMOL/L (ref 1.15–1.33)
CA-I BLDA-SCNC: 1.17 MMOL/L (ref 1.15–1.33)
CA-I BLDA-SCNC: 1.18 MMOL/L (ref 1.15–1.33)
CA-I BLDA-SCNC: 1.19 MMOL/L (ref 1.15–1.33)
CA-I BLDA-SCNC: 1.19 MMOL/L (ref 1.15–1.33)
CA-I BLDA-SCNC: 1.26 MMOL/L (ref 1.12–1.32)
CA-I BLDA-SCNC: 1.26 MMOL/L (ref 1.15–1.33)
CA-I BLDA-SCNC: 1.44 MMOL/L (ref 1.12–1.32)
CA-I SERPL ISE-MCNC: 1.24 MMOL/L (ref 1.2–1.3)
CALCIUM SPEC-SCNC: 8 MG/DL (ref 8.6–10.5)
CALCIUM SPEC-SCNC: 8.4 MG/DL (ref 8.6–10.5)
CALCIUM SPEC-SCNC: 9.4 MG/DL (ref 8.6–10.5)
CHLORIDE SERPL-SCNC: 106 MMOL/L (ref 98–107)
CHLORIDE SERPL-SCNC: 110 MMOL/L (ref 98–107)
CHLORIDE SERPL-SCNC: 112 MMOL/L (ref 98–107)
CO2 BLDA-SCNC: 24 MMOL/L (ref 23–27)
CO2 BLDA-SCNC: 26 MMOL/L (ref 23–27)
CO2 BLDA-SCNC: 28 MMOL/L (ref 23–27)
CO2 CONTENT VENOUS: ABNORMAL
CO2 SERPL-SCNC: 22 MMOL/L (ref 22–29)
CO2 SERPL-SCNC: 24 MMOL/L (ref 22–29)
CO2 SERPL-SCNC: 26 MMOL/L (ref 22–29)
CREAT SERPL-MCNC: 0.59 MG/DL (ref 0.57–1)
CREAT SERPL-MCNC: 0.65 MG/DL (ref 0.57–1)
CREAT SERPL-MCNC: 0.67 MG/DL (ref 0.57–1)
CROSSMATCH INTERPRETATION: NORMAL
CROSSMATCH INTERPRETATION: NORMAL
DEPRECATED RDW RBC AUTO: 42 FL (ref 37–54)
DEPRECATED RDW RBC AUTO: 42.9 FL (ref 37–54)
DEPRECATED RDW RBC AUTO: 43.3 FL (ref 37–54)
EGFRCR SERPLBLD CKD-EPI 2021: 100.2 ML/MIN/1.73
EGFRCR SERPLBLD CKD-EPI 2021: 100.9 ML/MIN/1.73
EGFRCR SERPLBLD CKD-EPI 2021: 103.3 ML/MIN/1.73
EOSINOPHIL # BLD AUTO: 0.1 10*3/MM3 (ref 0–0.4)
EOSINOPHIL # BLD AUTO: 0.2 10*3/MM3 (ref 0–0.4)
EOSINOPHIL NFR BLD AUTO: 0.6 % (ref 0.3–6.2)
EOSINOPHIL NFR BLD AUTO: 2 % (ref 0.3–6.2)
ERYTHROCYTE [DISTWIDTH] IN BLOOD BY AUTOMATED COUNT: 13.1 % (ref 12.3–15.4)
ERYTHROCYTE [DISTWIDTH] IN BLOOD BY AUTOMATED COUNT: 13.1 % (ref 12.3–15.4)
ERYTHROCYTE [DISTWIDTH] IN BLOOD BY AUTOMATED COUNT: 13.2 % (ref 12.3–15.4)
FIBRINOGEN PPP-MCNC: 211 MG/DL (ref 210–450)
GLOBULIN UR ELPH-MCNC: 2.3 GM/DL
GLUCOSE BLDC GLUCOMTR-MCNC: 107 MG/DL (ref 70–105)
GLUCOSE BLDC GLUCOMTR-MCNC: 126 MG/DL (ref 74–100)
GLUCOSE BLDC GLUCOMTR-MCNC: 126 MG/DL (ref 74–100)
GLUCOSE BLDC GLUCOMTR-MCNC: 127 MG/DL (ref 70–105)
GLUCOSE BLDC GLUCOMTR-MCNC: 127 MG/DL (ref 70–105)
GLUCOSE BLDC GLUCOMTR-MCNC: 128 MG/DL (ref 74–100)
GLUCOSE BLDC GLUCOMTR-MCNC: 128 MG/DL (ref 74–100)
GLUCOSE BLDC GLUCOMTR-MCNC: 132 MG/DL (ref 70–105)
GLUCOSE BLDC GLUCOMTR-MCNC: 132 MG/DL (ref 74–100)
GLUCOSE BLDC GLUCOMTR-MCNC: 132 MG/DL (ref 74–100)
GLUCOSE BLDC GLUCOMTR-MCNC: 135 MG/DL (ref 70–105)
GLUCOSE BLDC GLUCOMTR-MCNC: 135 MG/DL (ref 70–105)
GLUCOSE BLDC GLUCOMTR-MCNC: 136 MG/DL (ref 74–100)
GLUCOSE BLDC GLUCOMTR-MCNC: 139 MG/DL (ref 74–100)
GLUCOSE BLDC GLUCOMTR-MCNC: 139 MG/DL (ref 74–100)
GLUCOSE BLDC GLUCOMTR-MCNC: 146 MG/DL (ref 70–105)
GLUCOSE BLDC GLUCOMTR-MCNC: 90 MG/DL (ref 70–105)
GLUCOSE SERPL-MCNC: 130 MG/DL (ref 65–99)
GLUCOSE SERPL-MCNC: 141 MG/DL (ref 65–99)
GLUCOSE SERPL-MCNC: 93 MG/DL (ref 65–99)
HCO3 BLDA-SCNC: 22.1 MMOL/L (ref 21–28)
HCO3 BLDA-SCNC: 22.5 MMOL/L (ref 22–26)
HCO3 BLDA-SCNC: 22.8 MMOL/L (ref 21–28)
HCO3 BLDA-SCNC: 23.5 MMOL/L (ref 21–28)
HCO3 BLDA-SCNC: 24.8 MMOL/L (ref 22–26)
HCO3 BLDA-SCNC: 24.9 MMOL/L (ref 22–26)
HCO3 BLDA-SCNC: 25 MMOL/L (ref 22–26)
HCO3 BLDA-SCNC: 25.1 MMOL/L (ref 21–28)
HCO3 BLDA-SCNC: 25.6 MMOL/L (ref 21–28)
HCO3 BLDA-SCNC: 25.7 MMOL/L (ref 21–28)
HCO3 BLDA-SCNC: 25.9 MMOL/L (ref 22–26)
HCO3 BLDV-SCNC: 23.1 MMOL/L (ref 23–28)
HCT VFR BLD AUTO: 32 % (ref 34–46.6)
HCT VFR BLD AUTO: 37.7 % (ref 34–46.6)
HCT VFR BLD AUTO: 41.3 % (ref 34–46.6)
HCT VFR BLDA CALC: 27 % (ref 38–51)
HCT VFR BLDA CALC: 28 % (ref 38–51)
HCT VFR BLDA CALC: 29 % (ref 38–51)
HCT VFR BLDA CALC: 30 % (ref 38–51)
HCT VFR BLDA CALC: 31 % (ref 38–51)
HCT VFR BLDA CALC: 32 % (ref 38–51)
HCT VFR BLDA CALC: 32 % (ref 38–51)
HCT VFR BLDA CALC: 34 % (ref 38–51)
HCT VFR BLDA CALC: 36 % (ref 38–51)
HCT VFR BLDA CALC: 38 % (ref 38–51)
HEMODILUTION: YES
HGB BLD-MCNC: 10.7 G/DL (ref 12–15.9)
HGB BLD-MCNC: 12.5 G/DL (ref 12–15.9)
HGB BLD-MCNC: 14.1 G/DL (ref 12–15.9)
HGB BLDA-MCNC: 10.2 G/DL (ref 12–17)
HGB BLDA-MCNC: 10.5 G/DL (ref 12–17)
HGB BLDA-MCNC: 10.5 G/DL (ref 12–17)
HGB BLDA-MCNC: 10.6 G/DL (ref 12–17)
HGB BLDA-MCNC: 11 G/DL (ref 12–17)
HGB BLDA-MCNC: 11 G/DL (ref 12–17)
HGB BLDA-MCNC: 11.5 G/DL (ref 12–17)
HGB BLDA-MCNC: 12.3 G/DL (ref 12–17)
HGB BLDA-MCNC: 12.9 G/DL (ref 12–17)
HGB BLDA-MCNC: 9.2 G/DL (ref 12–17)
HGB BLDA-MCNC: 9.5 G/DL (ref 12–17)
HGB BLDA-MCNC: 9.9 G/DL (ref 12–17)
INHALED O2 CONCENTRATION: 40 %
INHALED O2 CONCENTRATION: 70 %
INR PPP: 0.98 (ref 0.93–1.1)
INR PPP: 1.09 (ref 0.93–1.1)
LYMPHOCYTES # BLD AUTO: 1.7 10*3/MM3 (ref 0.7–3.1)
LYMPHOCYTES # BLD AUTO: 2.8 10*3/MM3 (ref 0.7–3.1)
LYMPHOCYTES NFR BLD AUTO: 32.6 % (ref 19.6–45.3)
LYMPHOCYTES NFR BLD AUTO: 9.6 % (ref 19.6–45.3)
MAGNESIUM SERPL-MCNC: 2.7 MG/DL (ref 1.6–2.4)
MAGNESIUM SERPL-MCNC: 2.8 MG/DL (ref 1.6–2.4)
MCH RBC QN AUTO: 29.7 PG (ref 26.6–33)
MCH RBC QN AUTO: 30.1 PG (ref 26.6–33)
MCH RBC QN AUTO: 30.4 PG (ref 26.6–33)
MCHC RBC AUTO-ENTMCNC: 33.3 G/DL (ref 31.5–35.7)
MCHC RBC AUTO-ENTMCNC: 33.5 G/DL (ref 31.5–35.7)
MCHC RBC AUTO-ENTMCNC: 34.2 G/DL (ref 31.5–35.7)
MCV RBC AUTO: 88.8 FL (ref 79–97)
MCV RBC AUTO: 89 FL (ref 79–97)
MCV RBC AUTO: 90.5 FL (ref 79–97)
MODALITY: ABNORMAL
MONOCYTES # BLD AUTO: 0.7 10*3/MM3 (ref 0.1–0.9)
MONOCYTES # BLD AUTO: 0.7 10*3/MM3 (ref 0.1–0.9)
MONOCYTES NFR BLD AUTO: 3.8 % (ref 5–12)
MONOCYTES NFR BLD AUTO: 7.7 % (ref 5–12)
NEUTROPHILS NFR BLD AUTO: 14.9 10*3/MM3 (ref 1.7–7)
NEUTROPHILS NFR BLD AUTO: 4.9 10*3/MM3 (ref 1.7–7)
NEUTROPHILS NFR BLD AUTO: 56.5 % (ref 42.7–76)
NEUTROPHILS NFR BLD AUTO: 85.6 % (ref 42.7–76)
NRBC BLD AUTO-RTO: 0 /100 WBC (ref 0–0.2)
NRBC BLD AUTO-RTO: 0.1 /100 WBC (ref 0–0.2)
PCO2 BLDA: 36.9 MM HG (ref 35–48)
PCO2 BLDA: 38.3 MM HG (ref 35–48)
PCO2 BLDA: 43.2 MM HG (ref 35–45)
PCO2 BLDA: 44.1 MM HG (ref 35–48)
PCO2 BLDA: 46.7 MM HG (ref 35–45)
PCO2 BLDA: 47.9 MM HG (ref 35–45)
PCO2 BLDA: 50 MM HG (ref 35–45)
PCO2 BLDA: 52.9 MM HG (ref 35–48)
PCO2 BLDA: 56.7 MM HG (ref 35–48)
PCO2 BLDA: 57.4 MM HG (ref 35–45)
PCO2 BLDA: 57.9 MM HG (ref 35–48)
PCO2 BLDV: 51.4 MM HG (ref 41–51)
PEEP RESPIRATORY: 5 CM[H2O]
PEEP RESPIRATORY: 8 CM[H2O]
PEEP RESPIRATORY: 8 CM[H2O]
PH BLDA: 7.25 PH UNITS (ref 7.35–7.45)
PH BLDA: 7.26 PH UNITS (ref 7.35–7.45)
PH BLDA: 7.26 PH UNITS (ref 7.35–7.45)
PH BLDA: 7.28 PH UNITS (ref 7.35–7.45)
PH BLDA: 7.3 PH UNITS (ref 7.35–7.45)
PH BLDA: 7.32 PH UNITS (ref 7.35–7.45)
PH BLDA: 7.32 PH UNITS (ref 7.35–7.45)
PH BLDA: 7.33 PH UNITS (ref 7.35–7.45)
PH BLDA: 7.34 PH UNITS (ref 7.35–7.45)
PH BLDA: 7.38 PH UNITS (ref 7.35–7.45)
PH BLDA: 7.38 PH UNITS (ref 7.35–7.45)
PH BLDV: 7.26 PH UNITS (ref 7.31–7.41)
PHOSPHATE SERPL-MCNC: 3.1 MG/DL (ref 2.5–4.5)
PHOSPHATE SERPL-MCNC: 3.4 MG/DL (ref 2.5–4.5)
PLATELET # BLD AUTO: 157 10*3/MM3 (ref 140–450)
PLATELET # BLD AUTO: 157 10*3/MM3 (ref 140–450)
PLATELET # BLD AUTO: 184 10*3/MM3 (ref 140–450)
PMV BLD AUTO: 7.2 FL (ref 6–12)
PMV BLD AUTO: 7.7 FL (ref 6–12)
PMV BLD AUTO: 8.1 FL (ref 6–12)
PO2 BLDA: 102.7 MM HG (ref 83–108)
PO2 BLDA: 119 MM HG (ref 83–108)
PO2 BLDA: 144 MM HG (ref 80–105)
PO2 BLDA: 286 MM HG (ref 80–105)
PO2 BLDA: 396 MM HG (ref 80–105)
PO2 BLDA: 406 MM HG (ref 80–105)
PO2 BLDA: 442 MM HG (ref 80–105)
PO2 BLDA: 82.4 MM HG (ref 83–108)
PO2 BLDA: 86.4 MM HG (ref 83–108)
PO2 BLDA: 91.5 MM HG (ref 83–108)
PO2 BLDA: 92.3 MM HG (ref 83–108)
PO2 BLDV: 50 MM HG (ref 35–42)
POTASSIUM BLDA-SCNC: 3.7 MMOL/L (ref 3.5–4.5)
POTASSIUM BLDA-SCNC: 3.7 MMOL/L (ref 3.5–4.9)
POTASSIUM BLDA-SCNC: 3.8 MMOL/L (ref 3.5–4.5)
POTASSIUM BLDA-SCNC: 3.8 MMOL/L (ref 3.5–4.5)
POTASSIUM BLDA-SCNC: 3.9 MMOL/L (ref 3.5–4.5)
POTASSIUM BLDA-SCNC: 3.9 MMOL/L (ref 3.5–4.5)
POTASSIUM BLDA-SCNC: 4 MMOL/L (ref 3.5–4.5)
POTASSIUM BLDA-SCNC: 4.1 MMOL/L (ref 3.5–4.9)
POTASSIUM BLDA-SCNC: 4.2 MMOL/L (ref 3.5–4.9)
POTASSIUM BLDA-SCNC: 4.6 MMOL/L (ref 3.5–4.9)
POTASSIUM BLDA-SCNC: 4.6 MMOL/L (ref 3.5–4.9)
POTASSIUM BLDA-SCNC: 4.8 MMOL/L (ref 3.5–4.9)
POTASSIUM SERPL-SCNC: 3.9 MMOL/L (ref 3.5–5.2)
POTASSIUM SERPL-SCNC: 4 MMOL/L (ref 3.5–5.2)
POTASSIUM SERPL-SCNC: 4.2 MMOL/L (ref 3.5–5.2)
PROT SERPL-MCNC: 6.2 G/DL (ref 6–8.5)
PROTHROMBIN TIME: 10.5 SECONDS (ref 9.6–11.7)
PROTHROMBIN TIME: 11.6 SECONDS (ref 9.6–11.7)
PSV: 10 CMH2O
QT INTERVAL: 398 MS
QT INTERVAL: 425 MS
RBC # BLD AUTO: 3.6 10*6/MM3 (ref 3.77–5.28)
RBC # BLD AUTO: 4.16 10*6/MM3 (ref 3.77–5.28)
RBC # BLD AUTO: 4.64 10*6/MM3 (ref 3.77–5.28)
RESPIRATORY RATE: 14
RESPIRATORY RATE: 16
RESPIRATORY RATE: 16
SAO2 % BLDCOA: 100 % (ref 95–98)
SAO2 % BLDCOA: 95.1 % (ref 94–98)
SAO2 % BLDCOA: 95.4 % (ref 94–98)
SAO2 % BLDCOA: 95.6 % (ref 94–98)
SAO2 % BLDCOA: 96 % (ref 94–98)
SAO2 % BLDCOA: 97.8 % (ref 94–98)
SAO2 % BLDCOA: 98.4 % (ref 94–98)
SAO2 % BLDCOA: 99 % (ref 95–98)
SAO2 % BLDCOV: 25 % (ref 45–75)
SODIUM BLD-SCNC: 137 MMOL/L (ref 138–146)
SODIUM BLD-SCNC: 138 MMOL/L (ref 138–146)
SODIUM BLD-SCNC: 138 MMOL/L (ref 138–146)
SODIUM BLD-SCNC: 141 MMOL/L (ref 138–146)
SODIUM BLD-SCNC: 143 MMOL/L (ref 138–146)
SODIUM BLD-SCNC: 144 MMOL/L (ref 138–146)
SODIUM BLD-SCNC: 144 MMOL/L (ref 138–146)
SODIUM SERPL-SCNC: 141 MMOL/L (ref 136–145)
SODIUM SERPL-SCNC: 141 MMOL/L (ref 136–145)
SODIUM SERPL-SCNC: 146 MMOL/L (ref 136–145)
UNIT  ABO: NORMAL
UNIT  ABO: NORMAL
UNIT  RH: NORMAL
UNIT  RH: NORMAL
VENTILATOR MODE: ABNORMAL
VT ON VENT VENT: 600 ML
WBC NRBC COR # BLD: 14.6 10*3/MM3 (ref 3.4–10.8)
WBC NRBC COR # BLD: 17.4 10*3/MM3 (ref 3.4–10.8)
WBC NRBC COR # BLD: 8.6 10*3/MM3 (ref 3.4–10.8)

## 2023-08-11 PROCEDURE — C1751 CATH, INF, PER/CENT/MIDLINE: HCPCS | Performed by: ANESTHESIOLOGY

## 2023-08-11 PROCEDURE — 02100AW BYPASS CORONARY ARTERY, ONE ARTERY FROM AORTA WITH AUTOLOGOUS ARTERIAL TISSUE, OPEN APPROACH: ICD-10-PCS

## 2023-08-11 PROCEDURE — P9041 ALBUMIN (HUMAN),5%, 50ML: HCPCS

## 2023-08-11 PROCEDURE — 03B10ZZ EXCISION OF LEFT INTERNAL MAMMARY ARTERY, OPEN APPROACH: ICD-10-PCS

## 2023-08-11 PROCEDURE — 80051 ELECTROLYTE PANEL: CPT

## 2023-08-11 PROCEDURE — 84132 ASSAY OF SERUM POTASSIUM: CPT

## 2023-08-11 PROCEDURE — 85027 COMPLETE CBC AUTOMATED: CPT

## 2023-08-11 PROCEDURE — 85014 HEMATOCRIT: CPT

## 2023-08-11 PROCEDURE — 25010000002 CEFAZOLIN PER 500 MG

## 2023-08-11 PROCEDURE — 93005 ELECTROCARDIOGRAM TRACING: CPT

## 2023-08-11 PROCEDURE — 82948 REAGENT STRIP/BLOOD GLUCOSE: CPT

## 2023-08-11 PROCEDURE — 25010000002 VANCOMYCIN 1 G RECONSTITUTED SOLUTION 1 EACH VIAL: Performed by: ANESTHESIOLOGY

## 2023-08-11 PROCEDURE — 25010000002 MAGNESIUM SULFATE PER 500 MG OF MAGNESIUM: Performed by: ANESTHESIOLOGY

## 2023-08-11 PROCEDURE — 06BP4ZZ EXCISION OF RIGHT SAPHENOUS VEIN, PERCUTANEOUS ENDOSCOPIC APPROACH: ICD-10-PCS

## 2023-08-11 PROCEDURE — 82330 ASSAY OF CALCIUM: CPT

## 2023-08-11 PROCEDURE — 84295 ASSAY OF SERUM SODIUM: CPT

## 2023-08-11 PROCEDURE — C1751 CATH, INF, PER/CENT/MIDLINE: HCPCS

## 2023-08-11 PROCEDURE — 25010000002 ALBUMIN HUMAN 5% PER 50 ML: Performed by: ANESTHESIOLOGY

## 2023-08-11 PROCEDURE — 82803 BLOOD GASES ANY COMBINATION: CPT

## 2023-08-11 PROCEDURE — 25010000002 CEFAZOLIN PER 500 MG: Performed by: ANESTHESIOLOGY

## 2023-08-11 PROCEDURE — 021209W BYPASS CORONARY ARTERY, THREE ARTERIES FROM AORTA WITH AUTOLOGOUS VENOUS TISSUE, OPEN APPROACH: ICD-10-PCS

## 2023-08-11 PROCEDURE — C1887 CATHETER, GUIDING: HCPCS

## 2023-08-11 PROCEDURE — 94799 UNLISTED PULMONARY SVC/PX: CPT

## 2023-08-11 PROCEDURE — C1713 ANCHOR/SCREW BN/BN,TIS/BN: HCPCS

## 2023-08-11 PROCEDURE — 25010000002 ALBUMIN HUMAN 5% PER 50 ML

## 2023-08-11 PROCEDURE — 25010000002 HEPARIN (PORCINE) PER 1000 UNITS: Performed by: ANESTHESIOLOGY

## 2023-08-11 PROCEDURE — B245ZZ4 ULTRASONOGRAPHY OF LEFT HEART, TRANSESOPHAGEAL: ICD-10-PCS

## 2023-08-11 PROCEDURE — 85730 THROMBOPLASTIN TIME PARTIAL: CPT

## 2023-08-11 PROCEDURE — 83735 ASSAY OF MAGNESIUM: CPT

## 2023-08-11 PROCEDURE — 25010000002 FENTANYL CITRATE (PF) 250 MCG/5ML SOLUTION: Performed by: ANESTHESIOLOGY

## 2023-08-11 PROCEDURE — 25010000002 ONDANSETRON PER 1 MG: Performed by: ANESTHESIOLOGY

## 2023-08-11 PROCEDURE — 85610 PROTHROMBIN TIME: CPT

## 2023-08-11 PROCEDURE — C1729 CATH, DRAINAGE: HCPCS

## 2023-08-11 PROCEDURE — 93010 ELECTROCARDIOGRAM REPORT: CPT | Performed by: INTERNAL MEDICINE

## 2023-08-11 PROCEDURE — 5A1221Z PERFORMANCE OF CARDIAC OUTPUT, CONTINUOUS: ICD-10-PCS

## 2023-08-11 PROCEDURE — 25010000002 MORPHINE PER 10 MG: Performed by: NURSE PRACTITIONER

## 2023-08-11 PROCEDURE — 94761 N-INVAS EAR/PLS OXIMETRY MLT: CPT

## 2023-08-11 PROCEDURE — 25010000002 HEPARIN (PORCINE) PER 1000 UNITS

## 2023-08-11 PROCEDURE — 25010000002 MIDAZOLAM PER 1 MG: Performed by: ANESTHESIOLOGY

## 2023-08-11 PROCEDURE — 85018 HEMOGLOBIN: CPT

## 2023-08-11 PROCEDURE — 71045 X-RAY EXAM CHEST 1 VIEW: CPT

## 2023-08-11 PROCEDURE — 33533 CABG ARTERIAL SINGLE: CPT

## 2023-08-11 PROCEDURE — 80053 COMPREHEN METABOLIC PANEL: CPT

## 2023-08-11 PROCEDURE — 82947 ASSAY GLUCOSE BLOOD QUANT: CPT

## 2023-08-11 PROCEDURE — 84100 ASSAY OF PHOSPHORUS: CPT

## 2023-08-11 PROCEDURE — 25010000002 CALCIUM GLUCONATE PER 10 ML: Performed by: ANESTHESIOLOGY

## 2023-08-11 PROCEDURE — A4648 IMPLANTABLE TISSUE MARKER: HCPCS

## 2023-08-11 PROCEDURE — 25010000002 MAGNESIUM SULFATE IN D5W 1G/100ML (PREMIX) 1-5 GM/100ML-% SOLUTION

## 2023-08-11 PROCEDURE — 25010000002 ACETAMINOPHEN 10 MG/ML SOLUTION: Performed by: NURSE PRACTITIONER

## 2023-08-11 PROCEDURE — 25010000002 PAPAVERINE PER 60 MG

## 2023-08-11 PROCEDURE — 85347 COAGULATION TIME ACTIVATED: CPT

## 2023-08-11 PROCEDURE — 85025 COMPLETE CBC W/AUTO DIFF WBC: CPT

## 2023-08-11 PROCEDURE — 0 POTASSIUM CHLORIDE PER 2 MEQ

## 2023-08-11 PROCEDURE — 85384 FIBRINOGEN ACTIVITY: CPT

## 2023-08-11 PROCEDURE — 25010000002 ACETAMINOPHEN 10 MG/ML SOLUTION: Performed by: ANESTHESIOLOGY

## 2023-08-11 PROCEDURE — 25010000002 PROTAMINE SULFATE PER 10 MG: Performed by: ANESTHESIOLOGY

## 2023-08-11 PROCEDURE — P9041 ALBUMIN (HUMAN),5%, 50ML: HCPCS | Performed by: ANESTHESIOLOGY

## 2023-08-11 PROCEDURE — 93318 ECHO TRANSESOPHAGEAL INTRAOP: CPT | Performed by: ANESTHESIOLOGY

## 2023-08-11 PROCEDURE — 94002 VENT MGMT INPAT INIT DAY: CPT

## 2023-08-11 PROCEDURE — 25010000002 METOCLOPRAMIDE PER 10 MG

## 2023-08-11 DEVICE — SS SUTURE, 3 PER SLEEVE
Type: IMPLANTABLE DEVICE | Site: STERNUM | Status: FUNCTIONAL
Brand: MYO/WIRE II

## 2023-08-11 DEVICE — TEMP PACING WIRE
Type: IMPLANTABLE DEVICE | Site: HEART | Status: FUNCTIONAL
Brand: MYO/WIRE

## 2023-08-11 DEVICE — SS SUTURE, 6 PER SLEEVE
Type: IMPLANTABLE DEVICE | Site: STERNUM | Status: FUNCTIONAL
Brand: MYO/WIRE II

## 2023-08-11 DEVICE — DEV CONTRL TISS STRATAFIX SPIRAL MNCRYL UD 3/0 PLS 30CM: Type: IMPLANTABLE DEVICE | Site: CHEST | Status: FUNCTIONAL

## 2023-08-11 DEVICE — ABSORBABLE HEMOSTAT (OXIDIZED REGENERATED CELLULOSE, U.S.P.)
Type: IMPLANTABLE DEVICE | Site: CHEST | Status: FUNCTIONAL
Brand: SURGICEL

## 2023-08-11 DEVICE — WAX,BONE,NATURAL
Type: IMPLANTABLE DEVICE | Site: STERNUM | Status: FUNCTIONAL
Brand: MEDLINE INDUSTRIES

## 2023-08-11 DEVICE — DEV CONTRL TISS STRATAFIXSPIRALMNCRYL PLSPS2 REV3/0 15CM: Type: IMPLANTABLE DEVICE | Site: LEG | Status: FUNCTIONAL

## 2023-08-11 RX ORDER — ALBUMIN, HUMAN INJ 5% 5 %
1500 SOLUTION INTRAVENOUS AS NEEDED
Status: DISPENSED | OUTPATIENT
Start: 2023-08-11 | End: 2023-08-12

## 2023-08-11 RX ORDER — ACETAMINOPHEN 10 MG/ML
INJECTION, SOLUTION INTRAVENOUS AS NEEDED
Status: DISCONTINUED | OUTPATIENT
Start: 2023-08-11 | End: 2023-08-11 | Stop reason: SURG

## 2023-08-11 RX ORDER — MAGNESIUM HYDROXIDE 1200 MG/15ML
LIQUID ORAL AS NEEDED
Status: DISCONTINUED | OUTPATIENT
Start: 2023-08-11 | End: 2023-08-11 | Stop reason: HOSPADM

## 2023-08-11 RX ORDER — ALBUMIN, HUMAN INJ 5% 5 %
SOLUTION INTRAVENOUS CONTINUOUS PRN
Status: DISCONTINUED | OUTPATIENT
Start: 2023-08-11 | End: 2023-08-11 | Stop reason: SURG

## 2023-08-11 RX ORDER — CHLORHEXIDINE GLUCONATE 0.12 MG/ML
15 RINSE ORAL EVERY 12 HOURS
Status: DISCONTINUED | OUTPATIENT
Start: 2023-08-11 | End: 2023-08-15

## 2023-08-11 RX ORDER — PANTOPRAZOLE SODIUM 40 MG/10ML
40 INJECTION, POWDER, LYOPHILIZED, FOR SOLUTION INTRAVENOUS ONCE
Status: COMPLETED | OUTPATIENT
Start: 2023-08-11 | End: 2023-08-11

## 2023-08-11 RX ORDER — FENTANYL CITRATE 50 UG/ML
INJECTION, SOLUTION INTRAMUSCULAR; INTRAVENOUS AS NEEDED
Status: DISCONTINUED | OUTPATIENT
Start: 2023-08-11 | End: 2023-08-11 | Stop reason: SURG

## 2023-08-11 RX ORDER — ETOMIDATE 2 MG/ML
INJECTION INTRAVENOUS AS NEEDED
Status: DISCONTINUED | OUTPATIENT
Start: 2023-08-11 | End: 2023-08-11 | Stop reason: SURG

## 2023-08-11 RX ORDER — ACETAMINOPHEN 650 MG/1
650 SUPPOSITORY RECTAL EVERY 4 HOURS
Status: DISCONTINUED | OUTPATIENT
Start: 2023-08-11 | End: 2023-08-11

## 2023-08-11 RX ORDER — AMINOCAPROIC ACID 250 MG/ML
INJECTION, SOLUTION INTRAVENOUS AS NEEDED
Status: DISCONTINUED | OUTPATIENT
Start: 2023-08-11 | End: 2023-08-11 | Stop reason: SURG

## 2023-08-11 RX ORDER — HYDROCODONE BITARTRATE AND ACETAMINOPHEN 10; 325 MG/1; MG/1
1 TABLET ORAL EVERY 4 HOURS PRN
Status: DISCONTINUED | OUTPATIENT
Start: 2023-08-11 | End: 2023-08-15 | Stop reason: HOSPADM

## 2023-08-11 RX ORDER — NALOXONE HCL 0.4 MG/ML
0.4 VIAL (ML) INJECTION
Status: DISCONTINUED | OUTPATIENT
Start: 2023-08-11 | End: 2023-08-13

## 2023-08-11 RX ORDER — ACETAMINOPHEN 325 MG/1
650 TABLET ORAL EVERY 4 HOURS
Status: DISCONTINUED | OUTPATIENT
Start: 2023-08-11 | End: 2023-08-11

## 2023-08-11 RX ORDER — ACETAMINOPHEN 650 MG/1
650 SUPPOSITORY RECTAL EVERY 4 HOURS PRN
Status: DISCONTINUED | OUTPATIENT
Start: 2023-08-12 | End: 2023-08-15 | Stop reason: HOSPADM

## 2023-08-11 RX ORDER — CEFAZOLIN 2 G/1
INJECTION, POWDER, FOR SOLUTION INTRAMUSCULAR; INTRAVENOUS AS NEEDED
Status: DISCONTINUED | OUTPATIENT
Start: 2023-08-11 | End: 2023-08-11 | Stop reason: SURG

## 2023-08-11 RX ORDER — MAGNESIUM SULFATE 1 G/100ML
1 INJECTION INTRAVENOUS EVERY 8 HOURS
Status: COMPLETED | OUTPATIENT
Start: 2023-08-11 | End: 2023-08-12

## 2023-08-11 RX ORDER — ONDANSETRON 2 MG/ML
4 INJECTION INTRAMUSCULAR; INTRAVENOUS EVERY 6 HOURS PRN
Status: DISCONTINUED | OUTPATIENT
Start: 2023-08-11 | End: 2023-08-15 | Stop reason: HOSPADM

## 2023-08-11 RX ORDER — ONDANSETRON 2 MG/ML
INJECTION INTRAMUSCULAR; INTRAVENOUS AS NEEDED
Status: DISCONTINUED | OUTPATIENT
Start: 2023-08-11 | End: 2023-08-11 | Stop reason: SURG

## 2023-08-11 RX ORDER — ACETAMINOPHEN 325 MG/1
650 TABLET ORAL EVERY 4 HOURS PRN
Status: DISCONTINUED | OUTPATIENT
Start: 2023-08-12 | End: 2023-08-15 | Stop reason: HOSPADM

## 2023-08-11 RX ORDER — MORPHINE SULFATE 2 MG/ML
1 INJECTION, SOLUTION INTRAMUSCULAR; INTRAVENOUS EVERY 4 HOURS PRN
Status: DISCONTINUED | OUTPATIENT
Start: 2023-08-11 | End: 2023-08-11

## 2023-08-11 RX ORDER — ACETAMINOPHEN 160 MG/5ML
650 SOLUTION ORAL EVERY 4 HOURS PRN
Status: DISCONTINUED | OUTPATIENT
Start: 2023-08-12 | End: 2023-08-15 | Stop reason: HOSPADM

## 2023-08-11 RX ORDER — NOREPINEPHRINE BITARTRATE 0.03 MG/ML
.02-.3 INJECTION, SOLUTION INTRAVENOUS CONTINUOUS PRN
Status: DISCONTINUED | OUTPATIENT
Start: 2023-08-11 | End: 2023-08-12

## 2023-08-11 RX ORDER — NITROGLYCERIN 0.4 MG/1
0.4 TABLET SUBLINGUAL
Status: DISCONTINUED | OUTPATIENT
Start: 2023-08-11 | End: 2023-08-15 | Stop reason: HOSPADM

## 2023-08-11 RX ORDER — BISACODYL 10 MG
10 SUPPOSITORY, RECTAL RECTAL DAILY PRN
Status: DISCONTINUED | OUTPATIENT
Start: 2023-08-12 | End: 2023-08-15 | Stop reason: HOSPADM

## 2023-08-11 RX ORDER — POTASSIUM CHLORIDE 29.8 MG/ML
20 INJECTION INTRAVENOUS ONCE
Status: COMPLETED | OUTPATIENT
Start: 2023-08-11 | End: 2023-08-11

## 2023-08-11 RX ORDER — VECURONIUM BROMIDE 1 MG/ML
INJECTION, POWDER, LYOPHILIZED, FOR SOLUTION INTRAVENOUS AS NEEDED
Status: DISCONTINUED | OUTPATIENT
Start: 2023-08-11 | End: 2023-08-11 | Stop reason: SURG

## 2023-08-11 RX ORDER — ATORVASTATIN CALCIUM 40 MG/1
40 TABLET, FILM COATED ORAL NIGHTLY
Status: DISCONTINUED | OUTPATIENT
Start: 2023-08-11 | End: 2023-08-15 | Stop reason: HOSPADM

## 2023-08-11 RX ORDER — ENOXAPARIN SODIUM 100 MG/ML
40 INJECTION SUBCUTANEOUS DAILY
Status: DISCONTINUED | OUTPATIENT
Start: 2023-08-12 | End: 2023-08-15 | Stop reason: HOSPADM

## 2023-08-11 RX ORDER — ASPIRIN 81 MG/1
81 TABLET ORAL DAILY
Status: DISCONTINUED | OUTPATIENT
Start: 2023-08-12 | End: 2023-08-15 | Stop reason: HOSPADM

## 2023-08-11 RX ORDER — ACETAMINOPHEN 160 MG/5ML
650 SOLUTION ORAL EVERY 4 HOURS
Status: DISCONTINUED | OUTPATIENT
Start: 2023-08-11 | End: 2023-08-11

## 2023-08-11 RX ORDER — NITROGLYCERIN 20 MG/100ML
10-50 INJECTION INTRAVENOUS
Status: DISCONTINUED | OUTPATIENT
Start: 2023-08-11 | End: 2023-08-12

## 2023-08-11 RX ORDER — NOREPINEPHRINE BITARTRATE 0.03 MG/ML
INJECTION, SOLUTION INTRAVENOUS CONTINUOUS PRN
Status: DISCONTINUED | OUTPATIENT
Start: 2023-08-11 | End: 2023-08-11 | Stop reason: SURG

## 2023-08-11 RX ORDER — MEPERIDINE HYDROCHLORIDE 25 MG/ML
25 INJECTION INTRAMUSCULAR; INTRAVENOUS; SUBCUTANEOUS EVERY 4 HOURS PRN
Status: ACTIVE | OUTPATIENT
Start: 2023-08-11 | End: 2023-08-11

## 2023-08-11 RX ORDER — PANTOPRAZOLE SODIUM 40 MG/1
40 TABLET, DELAYED RELEASE ORAL DAILY
Status: DISCONTINUED | OUTPATIENT
Start: 2023-08-12 | End: 2023-08-15 | Stop reason: HOSPADM

## 2023-08-11 RX ORDER — POLYETHYLENE GLYCOL 3350 17 G/17G
17 POWDER, FOR SOLUTION ORAL DAILY PRN
Status: DISCONTINUED | OUTPATIENT
Start: 2023-08-11 | End: 2023-08-15 | Stop reason: HOSPADM

## 2023-08-11 RX ORDER — CALCIUM GLUCONATE 94 MG/ML
INJECTION, SOLUTION INTRAVENOUS AS NEEDED
Status: DISCONTINUED | OUTPATIENT
Start: 2023-08-11 | End: 2023-08-11 | Stop reason: SURG

## 2023-08-11 RX ORDER — AMOXICILLIN 250 MG
2 CAPSULE ORAL NIGHTLY
Status: DISCONTINUED | OUTPATIENT
Start: 2023-08-12 | End: 2023-08-14

## 2023-08-11 RX ORDER — SODIUM CHLORIDE 9 MG/ML
INJECTION, SOLUTION INTRAVENOUS CONTINUOUS PRN
Status: DISCONTINUED | OUTPATIENT
Start: 2023-08-11 | End: 2023-08-11 | Stop reason: SURG

## 2023-08-11 RX ORDER — MIDAZOLAM HYDROCHLORIDE 1 MG/ML
INJECTION INTRAMUSCULAR; INTRAVENOUS AS NEEDED
Status: DISCONTINUED | OUTPATIENT
Start: 2023-08-11 | End: 2023-08-11 | Stop reason: SURG

## 2023-08-11 RX ORDER — DEXTROSE MONOHYDRATE 25 G/50ML
10-50 INJECTION, SOLUTION INTRAVENOUS
Status: DISCONTINUED | OUTPATIENT
Start: 2023-08-14 | End: 2023-08-13

## 2023-08-11 RX ORDER — ASPIRIN 325 MG
325 TABLET ORAL ONCE
Status: COMPLETED | OUTPATIENT
Start: 2023-08-11 | End: 2023-08-11

## 2023-08-11 RX ORDER — NICOTINE POLACRILEX 4 MG
15 LOZENGE BUCCAL
Status: DISCONTINUED | OUTPATIENT
Start: 2023-08-14 | End: 2023-08-13

## 2023-08-11 RX ORDER — METOCLOPRAMIDE HYDROCHLORIDE 5 MG/ML
10 INJECTION INTRAMUSCULAR; INTRAVENOUS EVERY 6 HOURS
Status: COMPLETED | OUTPATIENT
Start: 2023-08-11 | End: 2023-08-12

## 2023-08-11 RX ORDER — NALOXONE HCL 0.4 MG/ML
0.4 VIAL (ML) INJECTION
Status: DISCONTINUED | OUTPATIENT
Start: 2023-08-11 | End: 2023-08-11

## 2023-08-11 RX ORDER — HEPARIN SODIUM 1000 [USP'U]/ML
INJECTION, SOLUTION INTRAVENOUS; SUBCUTANEOUS AS NEEDED
Status: DISCONTINUED | OUTPATIENT
Start: 2023-08-11 | End: 2023-08-11 | Stop reason: SURG

## 2023-08-11 RX ORDER — IBUPROFEN 600 MG/1
1 TABLET ORAL
Status: DISCONTINUED | OUTPATIENT
Start: 2023-08-14 | End: 2023-08-13

## 2023-08-11 RX ORDER — MORPHINE SULFATE 2 MG/ML
2 INJECTION, SOLUTION INTRAMUSCULAR; INTRAVENOUS
Status: DISCONTINUED | OUTPATIENT
Start: 2023-08-11 | End: 2023-08-13

## 2023-08-11 RX ORDER — DEXMEDETOMIDINE HYDROCHLORIDE 4 UG/ML
.2-1.5 INJECTION, SOLUTION INTRAVENOUS
Status: DISCONTINUED | OUTPATIENT
Start: 2023-08-11 | End: 2023-08-12

## 2023-08-11 RX ORDER — BISACODYL 5 MG/1
10 TABLET, DELAYED RELEASE ORAL DAILY PRN
Status: DISCONTINUED | OUTPATIENT
Start: 2023-08-11 | End: 2023-08-15 | Stop reason: HOSPADM

## 2023-08-11 RX ORDER — ACETAMINOPHEN 10 MG/ML
1000 INJECTION, SOLUTION INTRAVENOUS EVERY 8 HOURS
Status: COMPLETED | OUTPATIENT
Start: 2023-08-11 | End: 2023-08-12

## 2023-08-11 RX ORDER — SODIUM CHLORIDE 9 MG/ML
30 INJECTION, SOLUTION INTRAVENOUS CONTINUOUS
Status: DISCONTINUED | OUTPATIENT
Start: 2023-08-11 | End: 2023-08-13

## 2023-08-11 RX ORDER — KETAMINE HCL IN NACL, ISO-OSM 100MG/10ML
SYRINGE (ML) INJECTION AS NEEDED
Status: DISCONTINUED | OUTPATIENT
Start: 2023-08-11 | End: 2023-08-11 | Stop reason: SURG

## 2023-08-11 RX ADMIN — Medication 20 MG: at 06:48

## 2023-08-11 RX ADMIN — CEFAZOLIN 2 G: 2 INJECTION, POWDER, FOR SOLUTION INTRAMUSCULAR; INTRAVENOUS at 18:04

## 2023-08-11 RX ADMIN — VANCOMYCIN HYDROCHLORIDE 1 G: 1 INJECTION, POWDER, LYOPHILIZED, FOR SOLUTION INTRAVENOUS at 07:15

## 2023-08-11 RX ADMIN — Medication 30 MG: at 06:36

## 2023-08-11 RX ADMIN — HEPARIN SODIUM 25000 UNITS: 1000 INJECTION INTRAVENOUS; SUBCUTANEOUS at 08:38

## 2023-08-11 RX ADMIN — CHLORHEXIDINE GLUCONATE 15 ML: 1.2 SOLUTION ORAL at 21:12

## 2023-08-11 RX ADMIN — ACETAMINOPHEN 1000 MG: 10 INJECTION, SOLUTION INTRAVENOUS at 18:05

## 2023-08-11 RX ADMIN — ALBUMIN (HUMAN) 250 ML: 12.5 INJECTION, SOLUTION INTRAVENOUS at 17:42

## 2023-08-11 RX ADMIN — CEFAZOLIN 2 G: 2 INJECTION, POWDER, FOR SOLUTION INTRAMUSCULAR; INTRAVENOUS at 07:25

## 2023-08-11 RX ADMIN — DEXMEDETOMIDINE HYDROCHLORIDE 0.5 MCG/KG/HR: 100 INJECTION, SOLUTION INTRAVENOUS at 07:33

## 2023-08-11 RX ADMIN — ALBUMIN HUMAN: 0.05 INJECTION, SOLUTION INTRAVENOUS at 10:36

## 2023-08-11 RX ADMIN — ATORVASTATIN CALCIUM 40 MG: 40 TABLET, FILM COATED ORAL at 21:12

## 2023-08-11 RX ADMIN — MAGNESIUM SULFATE HEPTAHYDRATE 1 G: 1 INJECTION, SOLUTION INTRAVENOUS at 20:23

## 2023-08-11 RX ADMIN — ALBUMIN HUMAN: 0.05 INJECTION, SOLUTION INTRAVENOUS at 10:20

## 2023-08-11 RX ADMIN — ETOMIDATE 20 MG: 2 INJECTION INTRAVENOUS at 06:48

## 2023-08-11 RX ADMIN — VECURONIUM BROMIDE 10 MG: 1 INJECTION, POWDER, LYOPHILIZED, FOR SOLUTION INTRAVENOUS at 06:48

## 2023-08-11 RX ADMIN — POTASSIUM CHLORIDE 20 MEQ: 400 INJECTION, SOLUTION INTRAVENOUS at 22:18

## 2023-08-11 RX ADMIN — PROTAMINE SULFATE 350 MG: 10 INJECTION, SOLUTION INTRAVENOUS at 10:14

## 2023-08-11 RX ADMIN — FENTANYL CITRATE 250 MCG: 0.05 INJECTION, SOLUTION INTRAMUSCULAR; INTRAVENOUS at 06:48

## 2023-08-11 RX ADMIN — CALCIUM GLUCONATE 1 G: 98 INJECTION, SOLUTION INTRAVENOUS at 10:16

## 2023-08-11 RX ADMIN — PANTOPRAZOLE SODIUM 40 MG: 40 INJECTION, POWDER, LYOPHILIZED, FOR SOLUTION INTRAVENOUS at 16:32

## 2023-08-11 RX ADMIN — Medication 0.03 MCG/KG/MIN: at 10:48

## 2023-08-11 RX ADMIN — MAGNESIUM SULFATE HEPTAHYDRATE 1 G: 1 INJECTION, SOLUTION INTRAVENOUS at 13:12

## 2023-08-11 RX ADMIN — VECURONIUM BROMIDE 3 MG: 1 INJECTION, POWDER, LYOPHILIZED, FOR SOLUTION INTRAVENOUS at 10:09

## 2023-08-11 RX ADMIN — METOCLOPRAMIDE 10 MG: 5 INJECTION, SOLUTION INTRAMUSCULAR; INTRAVENOUS at 16:32

## 2023-08-11 RX ADMIN — ALBUMIN (HUMAN) 250 ML: 12.5 INJECTION, SOLUTION INTRAVENOUS at 11:43

## 2023-08-11 RX ADMIN — ALBUMIN (HUMAN) 250 ML: 12.5 INJECTION, SOLUTION INTRAVENOUS at 12:35

## 2023-08-11 RX ADMIN — MIDAZOLAM 5 MG: 1 INJECTION INTRAMUSCULAR; INTRAVENOUS at 06:36

## 2023-08-11 RX ADMIN — CEFAZOLIN 2 G: 2 INJECTION, POWDER, FOR SOLUTION INTRAMUSCULAR; INTRAVENOUS at 10:37

## 2023-08-11 RX ADMIN — MIDAZOLAM 2 MG: 1 INJECTION INTRAMUSCULAR; INTRAVENOUS at 10:02

## 2023-08-11 RX ADMIN — MORPHINE SULFATE 2 MG: 2 INJECTION, SOLUTION INTRAMUSCULAR; INTRAVENOUS at 22:18

## 2023-08-11 RX ADMIN — METOCLOPRAMIDE 10 MG: 5 INJECTION, SOLUTION INTRAMUSCULAR; INTRAVENOUS at 21:12

## 2023-08-11 RX ADMIN — ACETAMINOPHEN 1000 MG: 10 INJECTION, SOLUTION INTRAVENOUS at 11:01

## 2023-08-11 RX ADMIN — MORPHINE SULFATE 2 MG: 2 INJECTION, SOLUTION INTRAMUSCULAR; INTRAVENOUS at 17:33

## 2023-08-11 RX ADMIN — FENTANYL CITRATE 250 MCG: 0.05 INJECTION, SOLUTION INTRAMUSCULAR; INTRAVENOUS at 09:00

## 2023-08-11 RX ADMIN — FENTANYL CITRATE 250 MCG: 0.05 INJECTION, SOLUTION INTRAMUSCULAR; INTRAVENOUS at 10:02

## 2023-08-11 RX ADMIN — SODIUM BICARBONATE 50 MEQ: 84 INJECTION, SOLUTION INTRAVENOUS at 17:29

## 2023-08-11 RX ADMIN — AMINOCAPROIC ACID 10 G: 250 INJECTION, SOLUTION INTRAVENOUS at 07:34

## 2023-08-11 RX ADMIN — SODIUM CHLORIDE: 9 INJECTION, SOLUTION INTRAVENOUS at 06:35

## 2023-08-11 RX ADMIN — VECURONIUM BROMIDE 5 MG: 1 INJECTION, POWDER, LYOPHILIZED, FOR SOLUTION INTRAVENOUS at 09:00

## 2023-08-11 RX ADMIN — MUPIROCIN 1 APPLICATION: 20 OINTMENT TOPICAL at 21:13

## 2023-08-11 RX ADMIN — MIDAZOLAM 3 MG: 1 INJECTION INTRAMUSCULAR; INTRAVENOUS at 09:00

## 2023-08-11 RX ADMIN — AMINOCAPROIC ACID 10 G: 250 INJECTION, SOLUTION INTRAVENOUS at 10:52

## 2023-08-11 RX ADMIN — INSULIN HUMAN 2 UNITS/HR: 1 INJECTION, SOLUTION INTRAVENOUS at 09:03

## 2023-08-11 RX ADMIN — Medication 50 MEQ: at 17:29

## 2023-08-11 RX ADMIN — MAGNESIUM SULFATE HEPTAHYDRATE 2 G: 500 INJECTION, SOLUTION INTRAMUSCULAR; INTRAVENOUS at 10:02

## 2023-08-11 RX ADMIN — FENTANYL CITRATE 250 MCG: 0.05 INJECTION, SOLUTION INTRAMUSCULAR; INTRAVENOUS at 07:54

## 2023-08-11 RX ADMIN — SODIUM CHLORIDE: 9 INJECTION, SOLUTION INTRAVENOUS at 11:03

## 2023-08-11 RX ADMIN — ONDANSETRON 4 MG: 2 INJECTION INTRAMUSCULAR; INTRAVENOUS at 11:01

## 2023-08-11 RX ADMIN — ALBUMIN (HUMAN) 250 ML: 12.5 INJECTION, SOLUTION INTRAVENOUS at 13:25

## 2023-08-11 RX ADMIN — MORPHINE SULFATE 2 MG: 2 INJECTION, SOLUTION INTRAMUSCULAR; INTRAVENOUS at 13:51

## 2023-08-11 RX ADMIN — ASPIRIN 325 MG: 325 TABLET ORAL at 21:12

## 2023-08-11 RX ADMIN — VECURONIUM BROMIDE 5 MG: 1 INJECTION, POWDER, LYOPHILIZED, FOR SOLUTION INTRAVENOUS at 08:10

## 2023-08-11 RX ADMIN — MORPHINE SULFATE 2 MG: 2 INJECTION, SOLUTION INTRAMUSCULAR; INTRAVENOUS at 20:23

## 2023-08-11 NOTE — ANESTHESIA PROCEDURE NOTES
Central Line      Patient location during procedure: OR  Start time: 8/11/2023 7:00 AM  Stop Time:8/11/2023 7:30 AM  Indications: central pressure monitoring, vascular access and MD/Surgeon request  Staff  Anesthesiologist: Dylan Jean MD  Preanesthetic Checklist  Completed: patient identified, IV checked, site marked, risks and benefits discussed, surgical consent, monitors and equipment checked, pre-op evaluation and timeout performed  Central Line Prep  Sterile Tech:gloves, cap, gown, mask and sterile barriers  Prep: chloraprep  no medical exclusion documented for following all elements of maximal sterile barrier technique  Patient monitoring: blood pressure monitoring, continuous pulse oximetry and EKG  Central Line Procedure  Laterality:left  Location:internal jugular  Catheter Type:double lumen and MAC  Catheter Size:9 Fr  Guidance:ultrasound guided  PROCEDURE NOTE/ULTRASOUND INTERPRETATION.  Using ultrasound guidance the potential vascular sites for insertion of the catheter were visualized to determine the patency of the vessel to be used for vascular access.  After selecting the appropriate site for insertion, the needle was visualized under ultrasound being inserted into the internal jugular vein, followed by ultrasound confirmation of wire and catheter placement. There were no abnormalities seen on ultrasound; an image was taken; and the patient tolerated the procedure with no complications. Images: still images obtained, printed/placed on chart  Assessment  Post procedure:biopatch applied, line sutured and occlusive dressing applied  Assessement:blood return through all ports, free fluid flow, chest x-ray ordered and Sarbjit Test  Complications:no  Patient Tolerance:patient tolerated the procedure well with no apparent complications  Additional Notes  R IJ US SURVEY DEMONSTRATES SEPTUM/VS DUPLICATE SYSTEM? WITH THROMBOSIS HOWEVER PATENT LUMEN VISIBLE. EASY ACCESS X1 VIA ASEPTIC SELDINGER TECH THRU  ANTERIOR APPROACH. UNABLE TO ADVANCE GUIDE WIRE BEYOND 10CM.  L IJ MAC VIA ASEPTIC SELDINGER TECH THRU ANTERIOR APPROACH US GUIDANCE X1.

## 2023-08-11 NOTE — ANESTHESIA PROCEDURE NOTES
Arterial Line      Patient location during procedure: OR  Start time: 8/11/2023 6:36 AM  Stop Time:8/11/2023 6:41 AM       Line placed for hemodynamic monitoring, ABGs/Labs/ISTAT and MD/Surgeon request.  Performed By   Anesthesiologist: Dylan Jean MD   Preanesthetic Checklist  Completed: patient identified, IV checked, site marked, risks and benefits discussed, surgical consent, monitors and equipment checked, pre-op evaluation and timeout performed  Arterial Line Prep    Sterile Tech: cap, gloves, sterile barriers, mask and gown  Prep: ChloraPrep  Patient monitoring: EKG, continuous pulse oximetry and blood pressure monitoring  Arterial Line Procedure   Laterality:left  Location:  radial artery  Catheter size: 20 G   Guidance: palpation technique  PROCEDURE NOTE/ULTRASOUND INTERPRETATION.  Using ultrasound guidance the potential vascular sites for insertion of the catheter were visualized to determine the patency of the vessel to be used for vascular access.  After selecting the appropriate site for insertion, the needle was visualized under ultrasound being inserted into the radial artery, followed by ultrasound confirmation of wire and catheter placement. There were no abnormalities seen on ultrasound; an image was taken; and the patient tolerated the procedure with no complications.   Number of attempts: 1   Post Assessment   Dressing Type: wrist guard applied, secured with tape and occlusive dressing applied.   Complications no  Circ/Move/Sens Assessment: normal and unchanged.   Patient Tolerance: patient tolerated the procedure well with no apparent complications  Additional Notes  L RADIAL ART LINE VIA ASEPTIC SELDINGER TECH THRU ANTERIOR APPROACH US GUIDANCE X1. RNMD ASSIST. GOOD ALONSO

## 2023-08-11 NOTE — SIGNIFICANT NOTE
08/11/23 1350   OTHER   Discipline occupational therapist   Rehab Time/Intention   Session Not Performed other (see comments)  (pt scheduled for CABG 8/11/23. OT will follow up post op)

## 2023-08-11 NOTE — ANESTHESIA POSTPROCEDURE EVALUATION
Patient: Sara Chaves    Procedure Summary       Date: 08/11/23 Room / Location: [facility] CVOR 01 / [facility]OR    Anesthesia Start: 0635 Anesthesia Stop: 1118    Procedure: CORONARY ARTERY BYPASS GRAFTING (Chest) Diagnosis:       Coronary artery disease involving native coronary artery of native heart with unstable angina pectoris      (Coronary artery disease involving native coronary artery of native heart with unstable angina pectoris [I25.110])    Surgeons: Noe Clements MD Provider: Dylan Jean MD    Anesthesia Type: general, Exline, CVL, PAC ASA Status: 4            Anesthesia Type: general, Ellen, CVL, PAC    Vitals  Vitals Value Taken Time   BP     Temp 95.54 øF (35.3 øC) 08/11/23 1131   Pulse 80 08/11/23 1131   Resp     SpO2 99 % 08/11/23 1131   Vitals shown include unvalidated device data.        Post Anesthesia Care and Evaluation    Patient location during evaluation: ICU  Patient participation: complete - patient cannot participate  Level of consciousness: obtunded/minimal responses  Pain scale: See nurse's notes for pain score.  Pain management: adequate    Airway patency: patent  Anesthetic complications: No anesthetic complications  PONV Status: none  Cardiovascular status: acceptable  Respiratory status: acceptable, ventilator and ETT  Hydration status: acceptable    Comments: Patient seen and examined postoperatively; vital signs stable; SpO2 greater than or equal to 90%; cardiopulmonary status stable; nausea/vomiting adequately controlled; pain adequately controlled; no apparent anesthesia complications; patient discharged from anesthesia care when discharge criteria were met. Sedated on vent. Hemodynamically stable

## 2023-08-11 NOTE — ANESTHESIA PROCEDURE NOTES
Central Line      Patient location during procedure: OR  Start time: 8/11/2023 7:31 AM  Stop Time:8/11/2023 7:33 AM  Indications: central pressure monitoring, vascular access and MD/Surgeon request  Staff  Anesthesiologist: Dylan Jean MD  Preanesthetic Checklist  Completed: patient identified, IV checked, site marked, risks and benefits discussed, surgical consent, monitors and equipment checked, pre-op evaluation and timeout performed  Central Line Prep  Sterile Tech:gloves, cap, gown, mask and sterile barriers  Prep: chloraprep  no medical exclusion documented for following all elements of maximal sterile barrier technique  Patient monitoring: blood pressure monitoring, continuous pulse oximetry and EKG  Central Line Procedure  Catheter Type:[place]-La[place]ano  Assessment  Post procedure:biopatch applied, line sutured and occlusive dressing applied  Assessement:blood return through all ports, free fluid flow and chest x-ray ordered  Complications:no  Patient Tolerance:patient tolerated the procedure well with no apparent complications  Additional Notes  THRU PREVIOUSLY PLACED MAC INTRODUCER. NEW GLOVES/DRAPE. TO PA X1 ATTEMPT 56CM NO WEDGE ATTEMPTED.

## 2023-08-11 NOTE — ANESTHESIA PROCEDURE NOTES
Intra-Op Anesthesia MARY    Procedure Performed: Intra-Op Anesthesia MARY       Start Time:  8/11/2023 7:35 AM       End Time:   8/11/2023 7:45 AM    Preanesthesia Checklist:  Patient identified, IV assessed, risks and benefits discussed, monitors and equipment assessed, procedure being performed at surgeon's request and anesthesia consent obtained.    General Procedure Information  MARY Placed for monitoring purposes only -- This is not a diagnostic MARY  Diagnostic Indications for Echo:  assessment of ascending aorta, assessment of surgical repair, defect repair evaluation and hemodynamic monitoring  Location performed:  OR  Intubated  Bite block placed  Heart visualized  Probe Insertion:  Easy  Probe Type:  Multiplane  Modalities:  Color flow mapping, continuous wave Doppler and pulse wave Doppler    Echocardiographic and Doppler Measurements    Ventricles    Right Ventricle:  Cavity size normal.  Hypertrophy not present.  Thrombus not present.  Global function normal.    Left Ventricle:  Cavity size normal.  Thrombus not present.  Global Function mildly impaired.  Ejection Fraction 45%.          Valves    Aortic Valve:  Annulus normal.  Stenosis not present.  Regurgitation absent.  Leaflets normal.  Leaflet motions normal.      Mitral Valve:  Annulus normal.  Stenosis not present.  Regurgitation trace.  Leaflets normal.  Leaflet motions normal.      Tricuspid Valve:  Annulus normal.  Stenosis not present.  Regurgitation trace.  Leaflets normal.  Leaflet motions normal.    Pulmonic Valve:  Annulus normal.  Stenosis not present.  Regurgitation absent.        Aorta    Ascending Aorta:  Size normal.  Dissection not present.  Plaque thickness less than 3 mm.  Mobile plaque not present.    Aortic Arch:  Size normal.  Dissection not present.  Plaque thickness less than 3 mm.  Mobile plaque not present.    Descending Aorta:  Size normal.  Plaque thickness less than 3 mm.  Mobile plaque not present.        Atria    Right  Atrium:  Size normal.  Spontaneous echo contrast not present.  Thrombus not present.  Tumor not present.  Device not present.      Left Atrium:  Size normal.  Spontaneous echo contrast not present.  Thrombus not present.  Tumor not present.  Device not present.        Septa        Ventricular Septum:  Intra-ventricular septum morphology normal.          Other Findings  Pericardium:  normal  Pleural Effusion:  none  Pulmonary Arteries:  normal  Pulmonary Venous Flow:  normal    Anesthesia Information  Performed Personally  Anesthesiologist:  Dylan Jean MD

## 2023-08-11 NOTE — ANESTHESIA PROCEDURE NOTES
Airway  Urgency: elective    Date/Time: 8/11/2023 6:50 AM  End Time:8/11/2023 6:50 AM  Airway not difficult    General Information and Staff    Patient location during procedure: OR  Anesthesiologist: Dylan Jean MD    Indications and Patient Condition  Indications for airway management: airway protection    Preoxygenated: yes  MILS maintained throughout  Mask difficulty assessment: 1 - vent by mask    Final Airway Details  Final airway type: endotracheal airway      Successful airway: ETT  Cuffed: yes   Successful intubation technique: direct laryngoscopy  Endotracheal tube insertion site: oral  Blade: Khoi  Blade size: 3  ETT size (mm): 7.5  Cormack-Lehane Classification: grade I - full view of glottis  Placement verified by: chest auscultation and capnometry   Measured from: teeth  ETT/EBT  to teeth (cm): 22  Number of attempts at approach: 1  Assessment: lips, teeth, and gum same as pre-op and atraumatic intubation    Additional Comments  X1 UDVC. ATRAUMATIC.  TEETH IN PREOP CONDITION.  CUFF TO MINIMUM OCCLUSIVE CUFF PRESSURE. POS ETCO2. BS=BS. ENDO BITE BLOCK

## 2023-08-11 NOTE — OP NOTE
Operative Note    Date of Dictation: 08/11/23    Date of Procedure: 08/11/23    Referring Physician: Arden Salcido MD    Preoperative diagnosis: Multivessel CAD    Postoperative diagnosis: Same    Procedure:   1. CABG x 4 (LIMA to LAD, SVG to Diagonal, SVG to OM, SVG to RCA)  2. EVH of the right legs    Surgeon: Noe Clements MD     Assistants: JONAH Juarez was responsible for performing the following activities: Cardiac Surgery First assist, Endoscopic Vein [place] for CABG, surgical wound closure and their skilled assistance was necessary for the success of this case.     Anesthesia: General endotracheal anesthesia and MARY    Findings:  The saphenous vein was harvested endoscopically form the right  leg. The vein had a diameter of 4 mm and was of good quality. The coronaries had a diameter of 2 mm and were of good quality.      Estimated Blood Loss:  350 mL of Cell Saver returned.    STS Data: The STS Risk score discussed with the patient and family.  Counseling was done regarding abuse of tobacco, alcohol and drugs as needed. They understand and wish to proceed. The antibiotics and b blockers were given in the STS required window.          Description of the procedure:     The patient was placed supine on the operative table. General anesthesia was given and lines placed. The patient was prepped and draped using the usual sterile technique. A median sternotomy was performed with a scalpel and the layers carried down to the sternum using the electrocautery. The sternum was split in the midline using a vertical oscillating saw. Hemostasis was achieved. The LIMA was harvested skeletonized and prepared with papaverine. It was of good quality. 300 units/kg of IV heparin was given to an ACT over 400. A [place] retractor was placed and the mediastinum exposed, the pericardium was opened and the edges tacked to the wound. Cannulation sutures were placed in the ascending aorta and right atrium. Small  cannulas were placed and aorto right atrial cardiopulmonary bypass was started. Cardioplegia cannulas were placed. Cardiopulmonary bypass was then established for 66 minutes drifting to 34øC at appropriate flow rates.  The aorta was crossclamped for 58 minutes and we gave 1000 cc of antegrade cold blood cardioplegia then 200L of retrograde cold blood cardioplegia and then repeated doses every 10 to 15 minutes to good effect. 3veins were anastomosed to the ascending aorta with 6-0 Prolene and marked with washers.  The first vein was sewn to the distal right coronary artery with 7-0 Prolene.  The next vein was sewn to first diagonal with 7-0 Prolene.  The third vein was sewn obtuse marginal one of the circunflex artery with 7-0 Prolene. The LIMA was sewn to the distal LAD with 7-0 Prolene. A warm dose of cardioplegia was given and then the aortic clamp removed as well as the LIMA bulldog clamp. All anastomoses were checked and had good flow and morphology. The left pleural space was suctioned and the lungs ventilated. The heart was paced till regular atrial rhythm resumed. I allowed the heart to eject and once hemodynamics were acceptable, then the CPB was discontinued and the venous and cardioplegia cannulas removed. The matching dose of protamine was given and the aortic cannula removed as well. AV temporary wires and pleural and mediastinal chest tubes were placed and the wound sprayed with platelet rich plasma. The sternum was closed with single and double wires and soft tissue in layers of reabsorbable material. The wounds were covered with sterile dressings.       Specimen removed:  none    CPB time:  66 minutes.    Aortic clamp time:  58 minutes    Complications:  none           Disposition: Cardiovascular recovery room           Condition: Critical but stable.

## 2023-08-12 ENCOUNTER — APPOINTMENT (OUTPATIENT)
Dept: GENERAL RADIOLOGY | Facility: HOSPITAL | Age: 63
DRG: 236 | End: 2023-08-12
Payer: COMMERCIAL

## 2023-08-12 LAB
ALBUMIN SERPL-MCNC: 4.3 G/DL (ref 3.5–5.2)
ANION GAP SERPL CALCULATED.3IONS-SCNC: 8 MMOL/L (ref 5–15)
BASOPHILS # BLD AUTO: 0 10*3/MM3 (ref 0–0.2)
BASOPHILS NFR BLD AUTO: 0.3 % (ref 0–1.5)
BUN SERPL-MCNC: 12 MG/DL (ref 8–23)
BUN/CREAT SERPL: 20 (ref 7–25)
CA-I SERPL ISE-MCNC: 1.15 MMOL/L (ref 1.2–1.3)
CALCIUM SPEC-SCNC: 7.8 MG/DL (ref 8.6–10.5)
CHLORIDE SERPL-SCNC: 111 MMOL/L (ref 98–107)
CO2 SERPL-SCNC: 25 MMOL/L (ref 22–29)
CREAT SERPL-MCNC: 0.6 MG/DL (ref 0.57–1)
DEPRECATED RDW RBC AUTO: 44.6 FL (ref 37–54)
EGFRCR SERPLBLD CKD-EPI 2021: 102.9 ML/MIN/1.73
EOSINOPHIL # BLD AUTO: 0 10*3/MM3 (ref 0–0.4)
EOSINOPHIL NFR BLD AUTO: 0.1 % (ref 0.3–6.2)
ERYTHROCYTE [DISTWIDTH] IN BLOOD BY AUTOMATED COUNT: 13.5 % (ref 12.3–15.4)
GLUCOSE BLDC GLUCOMTR-MCNC: 110 MG/DL (ref 70–105)
GLUCOSE BLDC GLUCOMTR-MCNC: 122 MG/DL (ref 70–105)
GLUCOSE BLDC GLUCOMTR-MCNC: 134 MG/DL (ref 70–105)
GLUCOSE BLDC GLUCOMTR-MCNC: 136 MG/DL (ref 70–105)
GLUCOSE SERPL-MCNC: 124 MG/DL (ref 65–99)
HCT VFR BLD AUTO: 29 % (ref 34–46.6)
HGB BLD-MCNC: 9.8 G/DL (ref 12–15.9)
INR PPP: 1.01 (ref 0.93–1.1)
LYMPHOCYTES # BLD AUTO: 0.9 10*3/MM3 (ref 0.7–3.1)
LYMPHOCYTES NFR BLD AUTO: 8.5 % (ref 19.6–45.3)
MAGNESIUM SERPL-MCNC: 2.7 MG/DL (ref 1.6–2.4)
MCH RBC QN AUTO: 30.1 PG (ref 26.6–33)
MCHC RBC AUTO-ENTMCNC: 33.7 G/DL (ref 31.5–35.7)
MCV RBC AUTO: 89.3 FL (ref 79–97)
MONOCYTES # BLD AUTO: 0.7 10*3/MM3 (ref 0.1–0.9)
MONOCYTES NFR BLD AUTO: 6.5 % (ref 5–12)
NEUTROPHILS NFR BLD AUTO: 84.6 % (ref 42.7–76)
NEUTROPHILS NFR BLD AUTO: 9.2 10*3/MM3 (ref 1.7–7)
NRBC BLD AUTO-RTO: 0 /100 WBC (ref 0–0.2)
PHOSPHATE SERPL-MCNC: 3.1 MG/DL (ref 2.5–4.5)
PLATELET # BLD AUTO: 141 10*3/MM3 (ref 140–450)
PMV BLD AUTO: 7.9 FL (ref 6–12)
POTASSIUM SERPL-SCNC: 4.3 MMOL/L (ref 3.5–5.2)
PROTHROMBIN TIME: 10.8 SECONDS (ref 9.6–11.7)
QT INTERVAL: 380 MS
RBC # BLD AUTO: 3.25 10*6/MM3 (ref 3.77–5.28)
SODIUM SERPL-SCNC: 144 MMOL/L (ref 136–145)
WBC NRBC COR # BLD: 10.9 10*3/MM3 (ref 3.4–10.8)

## 2023-08-12 PROCEDURE — 25010000002 MORPHINE PER 10 MG: Performed by: NURSE PRACTITIONER

## 2023-08-12 PROCEDURE — 25010000002 ENOXAPARIN PER 10 MG

## 2023-08-12 PROCEDURE — 25010000002 ACETAMINOPHEN 10 MG/ML SOLUTION: Performed by: NURSE PRACTITIONER

## 2023-08-12 PROCEDURE — 25010000002 FUROSEMIDE PER 20 MG

## 2023-08-12 PROCEDURE — 82948 REAGENT STRIP/BLOOD GLUCOSE: CPT

## 2023-08-12 PROCEDURE — 71045 X-RAY EXAM CHEST 1 VIEW: CPT

## 2023-08-12 PROCEDURE — 25010000002 CEFAZOLIN PER 500 MG

## 2023-08-12 PROCEDURE — 25010000002 MAGNESIUM SULFATE IN D5W 1G/100ML (PREMIX) 1-5 GM/100ML-% SOLUTION

## 2023-08-12 PROCEDURE — 93005 ELECTROCARDIOGRAM TRACING: CPT

## 2023-08-12 PROCEDURE — 93010 ELECTROCARDIOGRAM REPORT: CPT | Performed by: INTERNAL MEDICINE

## 2023-08-12 PROCEDURE — 83735 ASSAY OF MAGNESIUM: CPT

## 2023-08-12 PROCEDURE — 82330 ASSAY OF CALCIUM: CPT

## 2023-08-12 PROCEDURE — 80069 RENAL FUNCTION PANEL: CPT

## 2023-08-12 PROCEDURE — 85025 COMPLETE CBC W/AUTO DIFF WBC: CPT

## 2023-08-12 PROCEDURE — 25010000002 CALCIUM GLUCONATE 2-0.675 GM/100ML-% SOLUTION

## 2023-08-12 PROCEDURE — 97163 PT EVAL HIGH COMPLEX 45 MIN: CPT

## 2023-08-12 PROCEDURE — 85610 PROTHROMBIN TIME: CPT

## 2023-08-12 PROCEDURE — 25010000002 METOCLOPRAMIDE PER 10 MG

## 2023-08-12 PROCEDURE — 25010000002 KETOROLAC TROMETHAMINE PER 15 MG

## 2023-08-12 RX ORDER — KETOROLAC TROMETHAMINE 15 MG/ML
15 INJECTION, SOLUTION INTRAMUSCULAR; INTRAVENOUS EVERY 8 HOURS
Status: COMPLETED | OUTPATIENT
Start: 2023-08-12 | End: 2023-08-13

## 2023-08-12 RX ORDER — CALCIUM GLUCONATE 20 MG/ML
2000 INJECTION, SOLUTION INTRAVENOUS ONCE
Status: COMPLETED | OUTPATIENT
Start: 2023-08-12 | End: 2023-08-12

## 2023-08-12 RX ORDER — FUROSEMIDE 10 MG/ML
40 INJECTION INTRAMUSCULAR; INTRAVENOUS ONCE
Status: DISCONTINUED | OUTPATIENT
Start: 2023-08-12 | End: 2023-08-12

## 2023-08-12 RX ORDER — PAROXETINE HYDROCHLORIDE 20 MG/1
20 TABLET, FILM COATED ORAL DAILY
Status: DISCONTINUED | OUTPATIENT
Start: 2023-08-12 | End: 2023-08-15 | Stop reason: HOSPADM

## 2023-08-12 RX ORDER — FUROSEMIDE 10 MG/ML
40 INJECTION INTRAMUSCULAR; INTRAVENOUS ONCE
Status: COMPLETED | OUTPATIENT
Start: 2023-08-12 | End: 2023-08-12

## 2023-08-12 RX ORDER — FUROSEMIDE 10 MG/ML
20 INJECTION INTRAMUSCULAR; INTRAVENOUS ONCE
Status: COMPLETED | OUTPATIENT
Start: 2023-08-12 | End: 2023-08-12

## 2023-08-12 RX ADMIN — KETOROLAC TROMETHAMINE 15 MG: 15 INJECTION, SOLUTION INTRAMUSCULAR; INTRAVENOUS at 17:34

## 2023-08-12 RX ADMIN — MORPHINE SULFATE 2 MG: 2 INJECTION, SOLUTION INTRAMUSCULAR; INTRAVENOUS at 06:26

## 2023-08-12 RX ADMIN — ATORVASTATIN CALCIUM 40 MG: 40 TABLET, FILM COATED ORAL at 21:01

## 2023-08-12 RX ADMIN — HYDROCODONE BITARTRATE AND ACETAMINOPHEN 1 TABLET: 10; 325 TABLET ORAL at 16:53

## 2023-08-12 RX ADMIN — ASPIRIN 81 MG: 81 TABLET, COATED ORAL at 08:42

## 2023-08-12 RX ADMIN — MORPHINE SULFATE 2 MG: 2 INJECTION, SOLUTION INTRAMUSCULAR; INTRAVENOUS at 08:57

## 2023-08-12 RX ADMIN — METOCLOPRAMIDE 10 MG: 5 INJECTION, SOLUTION INTRAMUSCULAR; INTRAVENOUS at 04:22

## 2023-08-12 RX ADMIN — MAGNESIUM SULFATE HEPTAHYDRATE 1 G: 1 INJECTION, SOLUTION INTRAVENOUS at 04:22

## 2023-08-12 RX ADMIN — CHLORHEXIDINE GLUCONATE 15 ML: 1.2 SOLUTION ORAL at 21:01

## 2023-08-12 RX ADMIN — SENNOSIDES AND DOCUSATE SODIUM 2 TABLET: 50; 8.6 TABLET ORAL at 21:00

## 2023-08-12 RX ADMIN — ACETAMINOPHEN 1000 MG: 10 INJECTION, SOLUTION INTRAVENOUS at 03:12

## 2023-08-12 RX ADMIN — HYDROCODONE BITARTRATE AND ACETAMINOPHEN 1 TABLET: 10; 325 TABLET ORAL at 00:10

## 2023-08-12 RX ADMIN — MORPHINE SULFATE 2 MG: 2 INJECTION, SOLUTION INTRAMUSCULAR; INTRAVENOUS at 11:02

## 2023-08-12 RX ADMIN — MUPIROCIN: 20 OINTMENT TOPICAL at 21:01

## 2023-08-12 RX ADMIN — CEFAZOLIN 2 G: 2 INJECTION, POWDER, FOR SOLUTION INTRAMUSCULAR; INTRAVENOUS at 17:04

## 2023-08-12 RX ADMIN — CEFAZOLIN 2 G: 2 INJECTION, POWDER, FOR SOLUTION INTRAMUSCULAR; INTRAVENOUS at 09:02

## 2023-08-12 RX ADMIN — PANTOPRAZOLE SODIUM 40 MG: 40 TABLET, DELAYED RELEASE ORAL at 08:42

## 2023-08-12 RX ADMIN — HYDROCODONE BITARTRATE AND ACETAMINOPHEN 1 TABLET: 10; 325 TABLET ORAL at 13:02

## 2023-08-12 RX ADMIN — FUROSEMIDE 40 MG: 10 INJECTION, SOLUTION INTRAMUSCULAR; INTRAVENOUS at 13:37

## 2023-08-12 RX ADMIN — CEFAZOLIN 2 G: 2 INJECTION, POWDER, FOR SOLUTION INTRAMUSCULAR; INTRAVENOUS at 02:09

## 2023-08-12 RX ADMIN — CHLORHEXIDINE GLUCONATE 15 ML: 1.2 SOLUTION ORAL at 08:43

## 2023-08-12 RX ADMIN — CALCIUM GLUCONATE 2000 MG: 20 INJECTION, SOLUTION INTRAVENOUS at 08:43

## 2023-08-12 RX ADMIN — HYDROCODONE BITARTRATE AND ACETAMINOPHEN 1 TABLET: 10; 325 TABLET ORAL at 07:36

## 2023-08-12 RX ADMIN — MORPHINE SULFATE 2 MG: 2 INJECTION, SOLUTION INTRAMUSCULAR; INTRAVENOUS at 00:11

## 2023-08-12 RX ADMIN — HYDROCODONE BITARTRATE AND ACETAMINOPHEN 1 TABLET: 10; 325 TABLET ORAL at 21:00

## 2023-08-12 RX ADMIN — PAROXETINE HYDROCHLORIDE 20 MG: 20 TABLET, FILM COATED ORAL at 10:58

## 2023-08-12 RX ADMIN — MORPHINE SULFATE 2 MG: 2 INJECTION, SOLUTION INTRAMUSCULAR; INTRAVENOUS at 02:08

## 2023-08-12 RX ADMIN — POLYETHYLENE GLYCOL 3350 17 G: 17 POWDER, FOR SOLUTION ORAL at 08:43

## 2023-08-12 RX ADMIN — METOCLOPRAMIDE 10 MG: 5 INJECTION, SOLUTION INTRAMUSCULAR; INTRAVENOUS at 09:01

## 2023-08-12 RX ADMIN — ENOXAPARIN SODIUM 40 MG: 100 INJECTION SUBCUTANEOUS at 16:53

## 2023-08-12 RX ADMIN — FUROSEMIDE 20 MG: 20 INJECTION, SOLUTION INTRAMUSCULAR; INTRAVENOUS at 08:43

## 2023-08-12 NOTE — PLAN OF CARE
Goal Outcome Evaluation:  Plan of Care Reviewed With: patient           Outcome Evaluation: Pt is a 63 YO F POD 1 CABG. Pt states she lives at home with spouse, typically is independent with all ADLs, ambulation without AD and no recent falls. Pt drives and works and reports having no steps to enter home.Pt vitals assessed throughout evaluation and maintained WFL throughout. Pt educated on sternal precautions and demonstrates good understanding. Pt this date requires CGA for transfers and ambulation using RWx. Pt likely to progress well and anticipate safe return home with family assist.      Anticipated Discharge Disposition (PT): home with assist

## 2023-08-12 NOTE — PROGRESS NOTES
" LOS: 3 days   Patient Care Team:  Provider, No Known as PCP - Jacob Webber MD as Consulting Physician (Cardiology)    Chief Complaint:       Subjective      Vital Signs  Temp:  [96.3 øF (35.7 øC)-98.6 øF (37 øC)] 97.8 øF (36.6 øC)  Heart Rate:  [73-97] 89  Resp:  [12-21] 18  BP: ()/(43-95) 100/70  FiO2 (%):  [40 %-70 %] 40 %  Body mass index is 35.12 kg/mý.    Intake/Output Summary (Last 24 hours) at 8/12/2023 0939  Last data filed at 8/12/2023 0838  Gross per 24 hour   Intake 5595.13 ml   Output 4630 ml   Net 965.13 ml     I/O this shift:  In: 1659 [P.O.:600; I.V.:1059]  Out: -       Wt Readings from Last 3 Encounters:   08/12/23 87.1 kg (192 lb 0.3 oz)         Objective:  Vital signs: (most recent): Blood pressure 100/70, pulse 89, temperature 97.8 øF (36.6 øC), temperature source Oral, resp. rate 18, height 157.5 cm (62\"), weight 87.1 kg (192 lb 0.3 oz), SpO2 93 %.              Results Review:        WBC No results found for: WBCS   HGB Hemoglobin   Date Value Ref Range Status   08/12/2023 9.8 (L) 12.0 - 15.9 g/dL Final   08/11/2023 10.6 (L) 12.0 - 17.0 g/dL Final   08/11/2023 11.0 (L) 12.0 - 17.0 g/dL Final   08/11/2023 10.7 (L) 12.0 - 15.9 g/dL Final   08/11/2023 11.0 (L) 12.0 - 17.0 g/dL Final   08/11/2023 10.5 (L) 12.0 - 17.0 g/dL Final   08/11/2023 11.5 (L) 12.0 - 17.0 g/dL Final   08/11/2023 12.3 12.0 - 17.0 g/dL Final   08/11/2023 12.5 12.0 - 15.9 g/dL Final   08/11/2023 10.2 (L) 12.0 - 17.0 g/dL Final   08/11/2023 9.9 (L) 12.0 - 17.0 g/dL Final   08/11/2023 9.5 (L) 12.0 - 17.0 g/dL Final   08/11/2023 10.5 (L) 12.0 - 17.0 g/dL Final   08/11/2023 9.2 (L) 12.0 - 17.0 g/dL Final   08/11/2023 12.9 12.0 - 17.0 g/dL Final   08/11/2023 14.1 12.0 - 15.9 g/dL Final   08/09/2023 14.6 12.0 - 15.9 g/dL Final      HCT Hematocrit   Date Value Ref Range Status   08/12/2023 29.0 (L) 34.0 - 46.6 % Final   08/11/2023 31 (L) 38 - 51 % Final   08/11/2023 32 (L) 38 - 51 % Final   08/11/2023 32.0 " (L) 34.0 - 46.6 % Final   08/11/2023 32 (L) 38 - 51 % Final   08/11/2023 31 (L) 38 - 51 % Final   08/11/2023 34 (L) 38 - 51 % Final   08/11/2023 36 (L) 38 - 51 % Final   08/11/2023 37.7 34.0 - 46.6 % Final   08/11/2023 30 (L) 38 - 51 % Final   08/11/2023 29 (L) 38 - 51 % Final   08/11/2023 28 (L) 38 - 51 % Final   08/11/2023 31 (L) 38 - 51 % Final   08/11/2023 27 (L) 38 - 51 % Final   08/11/2023 38 38 - 51 % Final   08/11/2023 41.3 34.0 - 46.6 % Final   08/09/2023 43.4 34.0 - 46.6 % Final      Platelets No results found for: LABPLAT     PT/INR:    Protime   Date Value Ref Range Status   08/12/2023 10.8 9.6 - 11.7 Seconds Final   08/11/2023 11.6 9.6 - 11.7 Seconds Final   08/11/2023 10.5 9.6 - 11.7 Seconds Final   08/09/2023 10.1 9.6 - 11.7 Seconds Final   /  INR   Date Value Ref Range Status   08/12/2023 1.01 0.93 - 1.10 Final   08/11/2023 1.09 0.93 - 1.10 Final   08/11/2023 0.98 0.93 - 1.10 Final   08/09/2023 0.94 0.93 - 1.10 Final       Sodium Sodium   Date Value Ref Range Status   08/12/2023 144 136 - 145 mmol/L Final   08/11/2023 146 (H) 136 - 145 mmol/L Final   08/11/2023 141 136 - 145 mmol/L Final   08/11/2023 141 136 - 145 mmol/L Final   08/09/2023 141 136 - 145 mmol/L Final      Potassium Potassium   Date Value Ref Range Status   08/12/2023 4.3 3.5 - 5.2 mmol/L Final   08/11/2023 4.0 3.5 - 5.2 mmol/L Final   08/11/2023 4.2 3.5 - 5.2 mmol/L Final     Comment:     Slight hemolysis detected by analyzer. Results may be affected.   08/11/2023 3.9 3.5 - 5.2 mmol/L Final   08/09/2023 3.8 3.5 - 5.2 mmol/L Final      Chloride Chloride   Date Value Ref Range Status   08/12/2023 111 (H) 98 - 107 mmol/L Final   08/11/2023 112 (H) 98 - 107 mmol/L Final   08/11/2023 110 (H) 98 - 107 mmol/L Final   08/11/2023 106 98 - 107 mmol/L Final   08/09/2023 106 98 - 107 mmol/L Final      Bicarbonate No results found for: PLASMABICARB   BUN BUN   Date Value Ref Range Status   08/12/2023 12 8 - 23 mg/dL Final   08/11/2023 11 8 - 23  mg/dL Final   08/11/2023 12 8 - 23 mg/dL Final   08/11/2023 15 8 - 23 mg/dL Final   08/09/2023 15 8 - 23 mg/dL Final      Creatinine Creatinine   Date Value Ref Range Status   08/12/2023 0.60 0.57 - 1.00 mg/dL Final   08/11/2023 0.59 0.57 - 1.00 mg/dL Final   08/11/2023 0.65 0.57 - 1.00 mg/dL Final   08/11/2023 0.67 0.57 - 1.00 mg/dL Final   08/09/2023 0.65 0.57 - 1.00 mg/dL Final      Calcium Calcium   Date Value Ref Range Status   08/12/2023 7.8 (L) 8.6 - 10.5 mg/dL Final   08/11/2023 8.0 (L) 8.6 - 10.5 mg/dL Final   08/11/2023 8.4 (L) 8.6 - 10.5 mg/dL Final   08/11/2023 9.4 8.6 - 10.5 mg/dL Final   08/09/2023 9.4 8.6 - 10.5 mg/dL Final      Magnesium Magnesium   Date Value Ref Range Status   08/12/2023 2.7 (H) 1.6 - 2.4 mg/dL Final   08/11/2023 2.7 (H) 1.6 - 2.4 mg/dL Final   08/11/2023 2.8 (H) 1.6 - 2.4 mg/dL Final         .imag    aspirin, 81 mg, Oral, Daily  atorvastatin, 40 mg, Oral, Nightly  ceFAZolin, 2 g, Intravenous, Q8H  chlorhexidine, 15 mL, Mouth/Throat, Q12H  enoxaparin, 40 mg, Subcutaneous, Daily  mupirocin, , Each Nare, BID  pantoprazole, 40 mg, Oral, Daily  PARoxetine, 20 mg, Oral, Daily  senna-docusate sodium, 2 tablet, Oral, Nightly      dexmedetomidine, 0.2-1.5 mcg/kg/hr, Last Rate: Stopped (08/11/23 1500)  EPINEPHrine, 0.02-0.3 mcg/kg/min  insulin, 0-100 Units/hr  niCARdipine, 5-15 mg/hr  nitroglycerin, 10-50 mcg/min  norepinephrine, 0.02-0.3 mcg/kg/min, Last Rate: 0.02 mcg/kg/min (08/12/23 0230)  sodium chloride, 30 mL/hr, Last Rate: 30 mL/hr (08/11/23 1255)            Coronary artery disease involving native coronary artery of native heart with unstable angina pectoris      Assessment & Plan    POD #1 CABG. Doing well. Give lasix , Creatinine normal. DC chest tubes after ambulation. [place]. Routine care      Noe Clements MD  08/12/23  09:39 EDT

## 2023-08-12 NOTE — THERAPY EVALUATION
Patient Name: Sara Chaves  : 1960    MRN: [id number]                              Today's Date: 2023       Admit Date: 2023    Visit Dx:     ICD-10-CM ICD-9-CM   1. Coronary artery disease involving native coronary artery of native heart with unstable angina pectoris  I25.110 414.01     411.1     Patient Active Problem List   Diagnosis    Coronary artery disease involving native coronary artery of native heart with unstable angina pectoris     Past Medical History:   Diagnosis Date    Coronary artery disease     Hyperlipidemia     Hypertension      Past Surgical History:   Procedure Laterality Date    KNEE ACL RECONSTRUCTION Right       General Information       Row Name 23 1334          Physical Therapy Time and Intention    Document Type evaluation  -EL     Mode of Treatment individual therapy;physical therapy  -EL       Row Name 23 1334          General Information    Patient Profile Reviewed yes  -EL     Prior Level of Function independent:;all household mobility;ADL's;driving;work  office work  -EL       Row Name 23 1334          Living Environment    People in Home spouse  -EL       Row Name 23 1334          Home Main Entrance    Number of Stairs, Main Entrance none  -EL       Row Name 23 1334          Stairs Within Home, Primary    Number of Stairs, Within Home, Primary none  -EL       Row Name 23 1334          Cognition    Orientation Status (Cognition) oriented x 4  -EL       Row Name 23 1334          Safety Issues, Functional Mobility    Impairments Affecting Function (Mobility) endurance/activity tolerance;strength;pain  -EL               User Key  (r) = Recorded By, (t) = Taken By, (c) = Cosigned By      Initials Name Provider Type    EL Luis Marquis, MICHAEL Physical Therapist                   Mobility       Row Name 23 1336          Bed Mobility    Bed Mobility sit-supine  -EL     Sit-Supine [place] (Bed Mobility) minimum assist (75%  patient effort);2 person assist  -EL     Comment, (Bed Mobility) cueing for sternal precautions.  -EL       Row Name 08/12/23 1336          Sit-Stand Transfer    Sit-Stand [place] (Transfers) contact guard  -EL     Comment, (Sit-Stand Transfer) VC for forward lean  -EL       Row Name 08/12/23 1336          Gait/Stairs (Locomotion)    [place] Level (Gait) contact guard  -EL     Assistive Device (Gait) walker, front-wheeled  -EL     Distance in Feet (Gait) 85  -EL     Deviations/Abnormal Patterns (Gait) gait speed decreased;stride length decreased  -EL               User Key  (r) = Recorded By, (t) = Taken By, (c) = Cosigned By      Initials Name Provider Type    Luis Blank, PT Physical Therapist                   Obj/Interventions       Row Name 08/12/23 1337          Range of Motion Comprehensive    General Range of Motion bilateral lower extremity ROM WFL  -EL       Row Name 08/12/23 1337          Strength Comprehensive (MMT)    General Manual Muscle Testing (MMT) Assessment lower extremity strength deficits identified  -EL     Comment, General Manual Muscle Testing (MMT) Assessment Functionally 3+/5 gross  -EL       Row Name 08/12/23 1337          Balance    Balance Assessment sitting static balance;standing static balance;standing dynamic balance  -EL     Static Sitting Balance independent  -EL     Static Standing Balance contact guard  -EL     Dynamic Standing Balance contact guard  -EL       Row Name 08/12/23 1337          Sensory Assessment (Somatosensory)    Sensory Assessment (Somatosensory) sensation intact  -EL               User Key  (r) = Recorded By, (t) = Taken By, (c) = Cosigned By      Initials Name Provider Type    Luis Blank, PT Physical Therapist                   Goals/Plan       Row Name 08/12/23 1347          Bed Mobility Goal 1 (PT)    Activity/Assistive Device (Bed Mobility Goal 1, PT) bed mobility activities, all  -EL     [place] Level/Cues Needed (Bed Mobility Goal 1,  PT) modified independence  -EL     Time Frame (Bed Mobility Goal 1, PT) long term goal (LTG);2 weeks  -EL     Strategies/Barriers (Bed Mobility Goal 1, PT) following sternal precautions  -EL       Row Name 08/12/23 1347          Transfer Goal 1 (PT)    Activity/Assistive Device (Transfer Goal 1, PT) transfers, all  -EL     [place] Level/Cues Needed (Transfer Goal 1, PT) independent  -EL     Time Frame (Transfer Goal 1, PT) long term goal (LTG);2 weeks  -EL     Strategies/Barriers (Transfers Goal 1, PT) following sternal precautions  -EL       Row Name 08/12/23 1347          Gait Training Goal 1 (PT)    Activity/Assistive Device (Gait Training Goal 1, PT) gait (walking locomotion)  -EL     [place] Level (Gait Training Goal 1, PT) independent  -EL     Distance (Gait Training Goal 1, PT) 400  -EL     Time Frame (Gait Training Goal 1, PT) long term goal (LTG);2 weeks  -EL       Row Name 08/12/23 1347          Patient Education Goal (PT)    Activity (Patient Education Goal, PT) Cardiac HEP  -EL     [place]/Cues/Accuracy (Memory Goal 2, PT) demonstrates adequately  -EL     Time Frame (Patient Education Goal, PT) long term goal (LTG);2 weeks  -EL       Row Name 08/12/23 1347          Therapy Assessment/Plan (PT)    Planned Therapy Interventions (PT) neuromuscular re-education;balance training;bed mobility training;transfer training;gait training;patient/family education;strengthening  -EL               User Key  (r) = Recorded By, (t) = Taken By, (c) = Cosigned By      Initials Name Provider Type    Luis Blank, PT Physical Therapist                   Clinical Impression       Row Name 08/12/23 1340          Pain    Pretreatment Pain Rating 9/10  -EL     Posttreatment Pain Rating 9/10  -EL     Pain Location - chest  -EL       Row Name 08/12/23 1340          Plan of Care Review    Plan of Care Reviewed With patient  -EL     Outcome Evaluation Pt is a 63 YO F POD 1 CABG. Pt states she lives at home with  spouse, typically is independent with all ADLs, ambulation without AD and no recent falls. Pt drives and works and reports having no steps to enter home.Pt vitals assessed throughout evaluation and maintained WFL throughout. Pt educated on sternal precautions and demonstrates good understanding. Pt this date requires CGA for transfers and ambulation using RWx. Pt likely to progress well and anticipate safe return home with family assist.  -EL       Row Name 08/12/23 1340          Therapy Assessment/Plan (PT)    Rehab Potential (PT) good, to achieve stated therapy goals  -EL     Criteria for Skilled Interventions Met (PT) yes  -EL     Therapy Frequency (PT) 5 times/wk  -EL     Predicted Duration of Therapy Intervention (PT) Until d/c  -EL       Row Name 08/12/23 1340          Vital Signs    Pre SpO2 (%) 95  -EL     O2 Delivery Pre Treatment supplemental O2  4L  -EL     Intra SpO2 (%) 94  -EL     O2 Delivery Intra Treatment supplemental O2  -EL     Post SpO2 (%) 94  -EL     O2 Delivery Post Treatment supplemental O2  -EL     Pre Patient Position Supine  -EL     Intra Patient Position Standing  -EL     Post Patient Position Sitting  -EL       Row Name 08/12/23 1340          Positioning and Restraints    Pre-Treatment Position sitting in chair/recliner  -EL     Post Treatment Position bed  -EL     In Bed supine;with nsg  -EL               User Key  (r) = Recorded By, (t) = Taken By, (c) = Cosigned By      Initials Name Provider Type    Luis Blank, PT Physical Therapist                   Outcome Measures       Row Name 08/12/23 2745          How much help from another person do you currently need...    Turning from your back to your side while in flat bed without using bedrails? 3  -EL     Moving from lying on back to sitting on the side of a flat bed without bedrails? 2  -EL     Moving to and from a bed to a chair (including a wheelchair)? 3  -EL     Standing up from a chair using your arms (e.g., wheelchair, bedside  chair)? 3  -EL     Climbing 3-5 steps with a railing? 2  -EL     To walk in hospital room? 3  -EL     AM-PAC 6 Clicks Score (PT) 16  -EL     Highest level of mobility 5 --> Static standing  -EL       Row Name 08/12/23 1348          Functional Assessment    Outcome Measure Options AM-PAC 6 Clicks Basic Mobility (PT)  -               User Key  (r) = Recorded By, (t) = Taken By, (c) = Cosigned By      Initials Name Provider Type    EL Luis Marquis, MICHAEL Physical Therapist                                 Physical Therapy Education       Title: PT OT SLP Therapies (Done)       Topic: Physical Therapy (Done)       Point: Mobility training (Done)       Learning Progress Summary             Patient Acceptance, E,TB, VU by  at 8/12/2023 1349                         Point: Precautions (Done)       Learning Progress Summary             Patient Acceptance, E,TB, VU by  at 8/12/2023 1349                                         User Key       Initials Effective Dates Name Provider Type Discipline     06/23/20 -  Luis Marquis, PT Physical Therapist PT                  PT Recommendation and Plan  Planned Therapy Interventions (PT): neuromuscular re-education, balance training, bed mobility training, transfer training, gait training, patient/family education, strengthening  Plan of Care Reviewed With: patient  Outcome Evaluation: Pt is a 61 YO F POD 1 CABG. Pt states she lives at home with spouse, typically is independent with all ADLs, ambulation without AD and no recent falls. Pt drives and works and reports having no steps to enter home.Pt vitals assessed throughout evaluation and maintained WFL throughout. Pt educated on sternal precautions and demonstrates good understanding. Pt this date requires CGA for transfers and ambulation using RWx. Pt likely to progress well and anticipate safe return home with family assist.     Time Calculation:   PT Evaluation Complexity  History, PT Evaluation Complexity: 3 or more personal  factors and/or comorbidities  Examination of Body Systems (PT Eval Complexity): total of 4 or more elements  Clinical Presentation (PT Evaluation Complexity): unstable  Clinical Decision Making (PT Evaluation Complexity): high complexity  Overall Complexity (PT Evaluation Complexity): high complexity     PT Charges       Row Name 08/12/23 1349             Time Calculation    Start Time 1022  -EL      Stop Time 1036  -EL      Time Calculation (min) 14 min  -EL      PT Received On 08/12/23  -EL      PT - Next Appointment 08/14/23  -EL      PT Goal Re-Cert Due Date 08/26/23  -EL                User Key  (r) = Recorded By, (t) = Taken By, (c) = Cosigned By      Initials Name Provider Type    Luis Blank PT Physical Therapist                  Therapy Charges for Today       Code Description Service Date Service Provider Modifiers Qty    [id number]  PT EVAL HIGH COMPLEXITY 4 8/12/2023 Luis Marquis PT GP 1            PT G-Codes  Outcome Measure Options: AM-PAC 6 Clicks Basic Mobility (PT)  AM-PAC 6 Clicks Score (PT): 16  PT Discharge Summary  Anticipated Discharge Disposition (PT): home with assist    Luis Marquis PT  8/12/2023

## 2023-08-13 ENCOUNTER — APPOINTMENT (OUTPATIENT)
Dept: GENERAL RADIOLOGY | Facility: HOSPITAL | Age: 63
DRG: 236 | End: 2023-08-13
Payer: COMMERCIAL

## 2023-08-13 LAB
ANION GAP SERPL CALCULATED.3IONS-SCNC: 11 MMOL/L (ref 5–15)
ANION GAP SERPL CALCULATED.3IONS-SCNC: 9 MMOL/L (ref 5–15)
BUN SERPL-MCNC: 17 MG/DL (ref 8–23)
BUN SERPL-MCNC: 17 MG/DL (ref 8–23)
BUN/CREAT SERPL: 23.3 (ref 7–25)
BUN/CREAT SERPL: 23.3 (ref 7–25)
CA-I SERPL ISE-MCNC: 1.23 MMOL/L (ref 1.2–1.3)
CALCIUM SPEC-SCNC: 8.7 MG/DL (ref 8.6–10.5)
CALCIUM SPEC-SCNC: 8.9 MG/DL (ref 8.6–10.5)
CHLORIDE SERPL-SCNC: 102 MMOL/L (ref 98–107)
CHLORIDE SERPL-SCNC: 102 MMOL/L (ref 98–107)
CO2 SERPL-SCNC: 25 MMOL/L (ref 22–29)
CO2 SERPL-SCNC: 25 MMOL/L (ref 22–29)
CREAT SERPL-MCNC: 0.73 MG/DL (ref 0.57–1)
CREAT SERPL-MCNC: 0.73 MG/DL (ref 0.57–1)
DEPRECATED RDW RBC AUTO: 45.5 FL (ref 37–54)
EGFRCR SERPLBLD CKD-EPI 2021: 94.3 ML/MIN/1.73
EGFRCR SERPLBLD CKD-EPI 2021: 94.3 ML/MIN/1.73
ERYTHROCYTE [DISTWIDTH] IN BLOOD BY AUTOMATED COUNT: 13.5 % (ref 12.3–15.4)
GLUCOSE BLDC GLUCOMTR-MCNC: 102 MG/DL (ref 70–105)
GLUCOSE BLDC GLUCOMTR-MCNC: 107 MG/DL (ref 70–105)
GLUCOSE BLDC GLUCOMTR-MCNC: 118 MG/DL (ref 70–105)
GLUCOSE BLDC GLUCOMTR-MCNC: 97 MG/DL (ref 70–105)
GLUCOSE SERPL-MCNC: 102 MG/DL (ref 65–99)
GLUCOSE SERPL-MCNC: 108 MG/DL (ref 65–99)
HCT VFR BLD AUTO: 25.7 % (ref 34–46.6)
HCT VFR BLD AUTO: 26.5 % (ref 34–46.6)
HGB BLD-MCNC: 8.5 G/DL (ref 12–15.9)
HGB BLD-MCNC: 8.8 G/DL (ref 12–15.9)
MCH RBC QN AUTO: 29.8 PG (ref 26.6–33)
MCHC RBC AUTO-ENTMCNC: 33.1 G/DL (ref 31.5–35.7)
MCV RBC AUTO: 89.8 FL (ref 79–97)
PLATELET # BLD AUTO: 119 10*3/MM3 (ref 140–450)
PMV BLD AUTO: 8.3 FL (ref 6–12)
POTASSIUM SERPL-SCNC: 3.4 MMOL/L (ref 3.5–5.2)
POTASSIUM SERPL-SCNC: 3.7 MMOL/L (ref 3.5–5.2)
POTASSIUM SERPL-SCNC: 3.8 MMOL/L (ref 3.5–5.2)
RBC # BLD AUTO: 2.95 10*6/MM3 (ref 3.77–5.28)
SODIUM SERPL-SCNC: 136 MMOL/L (ref 136–145)
SODIUM SERPL-SCNC: 138 MMOL/L (ref 136–145)
WBC NRBC COR # BLD: 12.5 10*3/MM3 (ref 3.4–10.8)

## 2023-08-13 PROCEDURE — 85027 COMPLETE CBC AUTOMATED: CPT

## 2023-08-13 PROCEDURE — 25010000002 ENOXAPARIN PER 10 MG

## 2023-08-13 PROCEDURE — 80048 BASIC METABOLIC PNL TOTAL CA: CPT

## 2023-08-13 PROCEDURE — 25010000002 CEFAZOLIN PER 500 MG

## 2023-08-13 PROCEDURE — 0 POTASSIUM CHLORIDE PER 2 MEQ

## 2023-08-13 PROCEDURE — 0 POTASSIUM CHLORIDE 10 MEQ/100ML SOLUTION

## 2023-08-13 PROCEDURE — 82330 ASSAY OF CALCIUM: CPT

## 2023-08-13 PROCEDURE — 85018 HEMOGLOBIN: CPT

## 2023-08-13 PROCEDURE — 25010000002 MORPHINE PER 10 MG: Performed by: NURSE PRACTITIONER

## 2023-08-13 PROCEDURE — 85014 HEMATOCRIT: CPT

## 2023-08-13 PROCEDURE — 82948 REAGENT STRIP/BLOOD GLUCOSE: CPT

## 2023-08-13 PROCEDURE — 93010 ELECTROCARDIOGRAM REPORT: CPT | Performed by: INTERNAL MEDICINE

## 2023-08-13 PROCEDURE — 25010000002 KETOROLAC TROMETHAMINE PER 15 MG

## 2023-08-13 PROCEDURE — 93005 ELECTROCARDIOGRAM TRACING: CPT

## 2023-08-13 PROCEDURE — 71045 X-RAY EXAM CHEST 1 VIEW: CPT

## 2023-08-13 PROCEDURE — 84132 ASSAY OF SERUM POTASSIUM: CPT

## 2023-08-13 PROCEDURE — 97166 OT EVAL MOD COMPLEX 45 MIN: CPT

## 2023-08-13 RX ORDER — BUMETANIDE 0.25 MG/ML
2 INJECTION INTRAMUSCULAR; INTRAVENOUS 3 TIMES DAILY
Status: DISCONTINUED | OUTPATIENT
Start: 2023-08-13 | End: 2023-08-14

## 2023-08-13 RX ORDER — CYCLOBENZAPRINE HCL 10 MG
5 TABLET ORAL ONCE
Status: COMPLETED | OUTPATIENT
Start: 2023-08-13 | End: 2023-08-13

## 2023-08-13 RX ORDER — POTASSIUM CHLORIDE 7.45 MG/ML
10 INJECTION INTRAVENOUS ONCE
Status: COMPLETED | OUTPATIENT
Start: 2023-08-13 | End: 2023-08-13

## 2023-08-13 RX ORDER — POTASSIUM CHLORIDE 29.8 MG/ML
20 INJECTION INTRAVENOUS ONCE
Status: COMPLETED | OUTPATIENT
Start: 2023-08-13 | End: 2023-08-13

## 2023-08-13 RX ORDER — DEXTROSE MONOHYDRATE 25 G/50ML
25 INJECTION, SOLUTION INTRAVENOUS
Status: DISCONTINUED | OUTPATIENT
Start: 2023-08-13 | End: 2023-08-15 | Stop reason: HOSPADM

## 2023-08-13 RX ORDER — GUAIFENESIN 600 MG/1
600 TABLET, EXTENDED RELEASE ORAL EVERY 12 HOURS SCHEDULED
Status: DISCONTINUED | OUTPATIENT
Start: 2023-08-13 | End: 2023-08-15 | Stop reason: HOSPADM

## 2023-08-13 RX ORDER — INSULIN LISPRO 100 [IU]/ML
2-7 INJECTION, SOLUTION INTRAVENOUS; SUBCUTANEOUS
Status: DISCONTINUED | OUTPATIENT
Start: 2023-08-13 | End: 2023-08-15 | Stop reason: HOSPADM

## 2023-08-13 RX ORDER — NICOTINE POLACRILEX 4 MG
15 LOZENGE BUCCAL
Status: DISCONTINUED | OUTPATIENT
Start: 2023-08-13 | End: 2023-08-15 | Stop reason: HOSPADM

## 2023-08-13 RX ORDER — IBUPROFEN 600 MG/1
1 TABLET ORAL
Status: DISCONTINUED | OUTPATIENT
Start: 2023-08-13 | End: 2023-08-15 | Stop reason: HOSPADM

## 2023-08-13 RX ORDER — POTASSIUM CHLORIDE 20 MEQ/1
40 TABLET, EXTENDED RELEASE ORAL EVERY 4 HOURS
Status: COMPLETED | OUTPATIENT
Start: 2023-08-13 | End: 2023-08-13

## 2023-08-13 RX ADMIN — HYDROCODONE BITARTRATE AND ACETAMINOPHEN 1 TABLET: 10; 325 TABLET ORAL at 20:50

## 2023-08-13 RX ADMIN — POTASSIUM CHLORIDE 40 MEQ: 1500 TABLET, EXTENDED RELEASE ORAL at 11:46

## 2023-08-13 RX ADMIN — BUMETANIDE 2 MG: 0.25 INJECTION INTRAMUSCULAR; INTRAVENOUS at 16:28

## 2023-08-13 RX ADMIN — POTASSIUM CHLORIDE 20 MEQ: 400 INJECTION, SOLUTION INTRAVENOUS at 19:50

## 2023-08-13 RX ADMIN — Medication 12.5 MG: at 14:18

## 2023-08-13 RX ADMIN — CYCLOBENZAPRINE 5 MG: 10 TABLET, FILM COATED ORAL at 09:17

## 2023-08-13 RX ADMIN — PANTOPRAZOLE SODIUM 40 MG: 40 TABLET, DELAYED RELEASE ORAL at 09:17

## 2023-08-13 RX ADMIN — MUPIROCIN 1 APPLICATION: 20 OINTMENT TOPICAL at 09:20

## 2023-08-13 RX ADMIN — BUMETANIDE 2 MG: 0.25 INJECTION INTRAMUSCULAR; INTRAVENOUS at 09:16

## 2023-08-13 RX ADMIN — MORPHINE SULFATE 2 MG: 2 INJECTION, SOLUTION INTRAMUSCULAR; INTRAVENOUS at 06:21

## 2023-08-13 RX ADMIN — ATORVASTATIN CALCIUM 40 MG: 40 TABLET, FILM COATED ORAL at 20:18

## 2023-08-13 RX ADMIN — CHLORHEXIDINE GLUCONATE 15 ML: 1.2 SOLUTION ORAL at 20:18

## 2023-08-13 RX ADMIN — POTASSIUM CHLORIDE 10 MEQ: 7.46 INJECTION, SOLUTION INTRAVENOUS at 09:51

## 2023-08-13 RX ADMIN — KETOROLAC TROMETHAMINE 15 MG: 15 INJECTION, SOLUTION INTRAMUSCULAR; INTRAVENOUS at 09:23

## 2023-08-13 RX ADMIN — ASPIRIN 81 MG: 81 TABLET, COATED ORAL at 09:17

## 2023-08-13 RX ADMIN — HYDROCODONE BITARTRATE AND ACETAMINOPHEN 1 TABLET: 10; 325 TABLET ORAL at 12:12

## 2023-08-13 RX ADMIN — HYDROCODONE BITARTRATE AND ACETAMINOPHEN 1 TABLET: 10; 325 TABLET ORAL at 06:05

## 2023-08-13 RX ADMIN — Medication 12.5 MG: at 20:33

## 2023-08-13 RX ADMIN — GUAIFENESIN 600 MG: 600 TABLET, EXTENDED RELEASE ORAL at 09:23

## 2023-08-13 RX ADMIN — BUMETANIDE 2 MG: 0.25 INJECTION INTRAMUSCULAR; INTRAVENOUS at 20:18

## 2023-08-13 RX ADMIN — POTASSIUM CHLORIDE 40 MEQ: 1500 TABLET, EXTENDED RELEASE ORAL at 07:49

## 2023-08-13 RX ADMIN — HYDROCODONE BITARTRATE AND ACETAMINOPHEN 1 TABLET: 10; 325 TABLET ORAL at 16:28

## 2023-08-13 RX ADMIN — HYDROCODONE BITARTRATE AND ACETAMINOPHEN 1 TABLET: 10; 325 TABLET ORAL at 01:53

## 2023-08-13 RX ADMIN — CEFAZOLIN 2 G: 2 INJECTION, POWDER, FOR SOLUTION INTRAMUSCULAR; INTRAVENOUS at 01:53

## 2023-08-13 RX ADMIN — PAROXETINE HYDROCHLORIDE 20 MG: 20 TABLET, FILM COATED ORAL at 09:18

## 2023-08-13 RX ADMIN — MORPHINE SULFATE 2 MG: 2 INJECTION, SOLUTION INTRAMUSCULAR; INTRAVENOUS at 00:48

## 2023-08-13 RX ADMIN — CHLORHEXIDINE GLUCONATE 15 ML: 1.2 SOLUTION ORAL at 09:19

## 2023-08-13 RX ADMIN — KETOROLAC TROMETHAMINE 15 MG: 15 INJECTION, SOLUTION INTRAMUSCULAR; INTRAVENOUS at 01:53

## 2023-08-13 RX ADMIN — MUPIROCIN: 20 OINTMENT TOPICAL at 20:18

## 2023-08-13 RX ADMIN — SENNOSIDES AND DOCUSATE SODIUM 2 TABLET: 50; 8.6 TABLET ORAL at 20:18

## 2023-08-13 RX ADMIN — ENOXAPARIN SODIUM 40 MG: 100 INJECTION SUBCUTANEOUS at 16:28

## 2023-08-13 RX ADMIN — GUAIFENESIN 600 MG: 600 TABLET, EXTENDED RELEASE ORAL at 20:18

## 2023-08-13 NOTE — PROGRESS NOTES
"   LOS: 4 days   Patient Care Team:  Provider, No Known as PCP - Jacob Webber MD as Consulting Physician (Cardiology)    Chief Complaint:       Subjective      Vital Signs  Temp:  [98.5 øF (36.9 øC)-99.3 øF (37.4 øC)] 98.5 øF (36.9 øC)  Heart Rate:  [75-87] 75  Resp:  [15-20] 17  BP: ()/(54-63) 105/56  Body mass index is 35.12 kg/mý.    Intake/Output Summary (Last 24 hours) at 8/13/2023 0918  Last data filed at 8/13/2023 0600  Gross per 24 hour   Intake 1139 ml   Output 1850 ml   Net -711 ml     No intake/output data recorded.      Wt Readings from Last 3 Encounters:   08/13/23 87.1 kg (192 lb 0.3 oz)         Objective:  Vital signs: (most recent): Blood pressure 105/56, pulse 75, temperature 98.5 øF (36.9 øC), temperature source Oral, resp. rate 17, height 157.5 cm (62\"), weight 87.1 kg (192 lb 0.3 oz), SpO2 93 %.              Results Review:        WBC No results found for: WBCS   HGB Hemoglobin   Date Value Ref Range Status   08/13/2023 8.5 (L) 12.0 - 15.9 g/dL Final   08/13/2023 8.8 (L) 12.0 - 15.9 g/dL Final   08/12/2023 9.8 (L) 12.0 - 15.9 g/dL Final   08/11/2023 10.6 (L) 12.0 - 17.0 g/dL Final   08/11/2023 11.0 (L) 12.0 - 17.0 g/dL Final   08/11/2023 10.7 (L) 12.0 - 15.9 g/dL Final   08/11/2023 11.0 (L) 12.0 - 17.0 g/dL Final   08/11/2023 10.5 (L) 12.0 - 17.0 g/dL Final   08/11/2023 11.5 (L) 12.0 - 17.0 g/dL Final   08/11/2023 12.3 12.0 - 17.0 g/dL Final   08/11/2023 12.5 12.0 - 15.9 g/dL Final   08/11/2023 10.2 (L) 12.0 - 17.0 g/dL Final   08/11/2023 9.9 (L) 12.0 - 17.0 g/dL Final   08/11/2023 9.5 (L) 12.0 - 17.0 g/dL Final   08/11/2023 10.5 (L) 12.0 - 17.0 g/dL Final   08/11/2023 9.2 (L) 12.0 - 17.0 g/dL Final   08/11/2023 12.9 12.0 - 17.0 g/dL Final   08/11/2023 14.1 12.0 - 15.9 g/dL Final      HCT Hematocrit   Date Value Ref Range Status   08/13/2023 25.7 (L) 34.0 - 46.6 % Final   08/13/2023 26.5 (L) 34.0 - 46.6 % Final   08/12/2023 29.0 (L) 34.0 - 46.6 % Final   08/11/2023 " 31 (L) 38 - 51 % Final   08/11/2023 32 (L) 38 - 51 % Final   08/11/2023 32.0 (L) 34.0 - 46.6 % Final   08/11/2023 32 (L) 38 - 51 % Final   08/11/2023 31 (L) 38 - 51 % Final   08/11/2023 34 (L) 38 - 51 % Final   08/11/2023 36 (L) 38 - 51 % Final   08/11/2023 37.7 34.0 - 46.6 % Final   08/11/2023 30 (L) 38 - 51 % Final   08/11/2023 29 (L) 38 - 51 % Final   08/11/2023 28 (L) 38 - 51 % Final   08/11/2023 31 (L) 38 - 51 % Final   08/11/2023 27 (L) 38 - 51 % Final   08/11/2023 38 38 - 51 % Final   08/11/2023 41.3 34.0 - 46.6 % Final      Platelets No results found for: LABPLAT     PT/INR:    Protime   Date Value Ref Range Status   08/12/2023 10.8 9.6 - 11.7 Seconds Final   08/11/2023 11.6 9.6 - 11.7 Seconds Final   08/11/2023 10.5 9.6 - 11.7 Seconds Final   /  INR   Date Value Ref Range Status   08/12/2023 1.01 0.93 - 1.10 Final   08/11/2023 1.09 0.93 - 1.10 Final   08/11/2023 0.98 0.93 - 1.10 Final       Sodium Sodium   Date Value Ref Range Status   08/13/2023 136 136 - 145 mmol/L Final   08/12/2023 144 136 - 145 mmol/L Final   08/11/2023 146 (H) 136 - 145 mmol/L Final   08/11/2023 141 136 - 145 mmol/L Final   08/11/2023 141 136 - 145 mmol/L Final      Potassium Potassium   Date Value Ref Range Status   08/13/2023 3.4 (L) 3.5 - 5.2 mmol/L Final   08/12/2023 4.3 3.5 - 5.2 mmol/L Final   08/11/2023 4.0 3.5 - 5.2 mmol/L Final   08/11/2023 4.2 3.5 - 5.2 mmol/L Final     Comment:     Slight hemolysis detected by analyzer. Results may be affected.   08/11/2023 3.9 3.5 - 5.2 mmol/L Final      Chloride Chloride   Date Value Ref Range Status   08/13/2023 102 98 - 107 mmol/L Final   08/12/2023 111 (H) 98 - 107 mmol/L Final   08/11/2023 112 (H) 98 - 107 mmol/L Final   08/11/2023 110 (H) 98 - 107 mmol/L Final   08/11/2023 106 98 - 107 mmol/L Final      Bicarbonate No results found for: PLASMABICARB   BUN BUN   Date Value Ref Range Status   08/13/2023 17 8 - 23 mg/dL Final   08/12/2023 12 8 - 23 mg/dL Final   08/11/2023 11 8 - 23  mg/dL Final   08/11/2023 12 8 - 23 mg/dL Final   08/11/2023 15 8 - 23 mg/dL Final      Creatinine Creatinine   Date Value Ref Range Status   08/13/2023 0.73 0.57 - 1.00 mg/dL Final   08/12/2023 0.60 0.57 - 1.00 mg/dL Final   08/11/2023 0.59 0.57 - 1.00 mg/dL Final   08/11/2023 0.65 0.57 - 1.00 mg/dL Final   08/11/2023 0.67 0.57 - 1.00 mg/dL Final      Calcium Calcium   Date Value Ref Range Status   08/13/2023 8.7 8.6 - 10.5 mg/dL Final   08/12/2023 7.8 (L) 8.6 - 10.5 mg/dL Final   08/11/2023 8.0 (L) 8.6 - 10.5 mg/dL Final   08/11/2023 8.4 (L) 8.6 - 10.5 mg/dL Final   08/11/2023 9.4 8.6 - 10.5 mg/dL Final      Magnesium Magnesium   Date Value Ref Range Status   08/12/2023 2.7 (H) 1.6 - 2.4 mg/dL Final   08/11/2023 2.7 (H) 1.6 - 2.4 mg/dL Final   08/11/2023 2.8 (H) 1.6 - 2.4 mg/dL Final         .imag    aspirin, 81 mg, Oral, Daily  atorvastatin, 40 mg, Oral, Nightly  bumetanide, 2 mg, Intravenous, TID  chlorhexidine, 15 mL, Mouth/Throat, Q12H  cyclobenzaprine, 5 mg, Oral, Once  enoxaparin, 40 mg, Subcutaneous, Daily  insulin lispro, 2-7 Units, Subcutaneous, 4x Daily AC & at Bedtime  ketorolac, 15 mg, Intravenous, Q8H  mupirocin, , Each Nare, BID  pantoprazole, 40 mg, Oral, Daily  PARoxetine, 20 mg, Oral, Daily  potassium chloride ER, 40 mEq, Oral, Q4H  potassium chloride, 10 mEq, Intravenous, Once  senna-docusate sodium, 2 tablet, Oral, Nightly               Coronary artery disease involving native coronary artery of native heart with unstable angina pectoris      Assessment & Plan    POD #2 CABG. Doing well. Some pain, add flexeril. CT / Central line out. DC wires tomorrow. Replace potasium. Labs in the afternoon.      Noe Clements MD  08/13/23  09:18 EDT

## 2023-08-13 NOTE — PLAN OF CARE
Goal Outcome Evaluation:  Plan of Care Reviewed With: patient        Progress: no change  Outcome Evaluation: Pt is a 63 y/o F admitted to [facility] 8/9/23 with chest pain. Pt POD 2 CABG x4 by Dr. Clements. CAD. PMHx significant for CAD. At baseline pt resides with spouse in [facility] with 0 REBECA. Pt typically independent with ADLs, no AD for mobility, drives and works. This date, pt with in bathroom with NSG staff, no AD and on room air upon arrival and family at bedside. Pt educated on sternal precautions and verbalized understanding. Pt comes to standing with SBA without AD, completes funcitonal mobility with CGA without AD and requires min A x2 for sit to supine. Pt desaturates to 88% with activity and placed on 1 L O2 and returned to 93%. Anticipate pt to progress well and likely to be safe to d/c home with family assist. OT will follow while admitted.      Anticipated Discharge Disposition (OT): home with assist

## 2023-08-13 NOTE — THERAPY EVALUATION
Patient Name: Sara Chaves  : 1960    MRN: [id number]                              Today's Date: 2023       Admit Date: 2023    Visit Dx:     ICD-10-CM ICD-9-CM   1. Coronary artery disease involving native coronary artery of native heart with unstable angina pectoris  I25.110 414.01     411.1     Patient Active Problem List   Diagnosis    Coronary artery disease involving native coronary artery of native heart with unstable angina pectoris     Past Medical History:   Diagnosis Date    Coronary artery disease     Hyperlipidemia     Hypertension      Past Surgical History:   Procedure Laterality Date    KNEE ACL RECONSTRUCTION Right       General Information       Row Name 23 1627          OT Time and Intention    Document Type evaluation  -MS     Mode of Treatment occupational therapy  -MS       Row Name 23          General Information    Patient Profile Reviewed yes  -MS     Prior Level of Function independent:;ADL's;driving;work;all household mobility;community mobility  -MS     Existing Precautions/Restrictions cardiac;fall;sternal;oxygen therapy device and L/min  -MS     Barriers to Rehab none identified  -MS       Row Name 23          Occupational Profile    Reason for Services/Referral (Occupational Profile) Pt is a 63 y/o F admitted to [facility] 23 with chest pain. Pt POD 2 CABG x4 by Dr. Clements. CAD. PMHx significant for CAD. At baseline pt resides with spouse in [facility] with 0 REBECA. Pt typically independent with ADLs, no AD for mobility, drives and works.  -MS     Environmental Supports and Barriers (Occupational Profile) supportive family  -MS       Row Name 23 162          Living Environment    People in Home spouse  -MS     Name(s) of People in Home Vinayak  -MS       Row Name 23 162          Home Main Entrance    Number of Stairs, Main Entrance none  -MS       Row Name 23 162          Stairs Within Home, Primary    Number of Stairs,  Within Home, Primary none  -MS       Row Name 08/13/23 1627          Cognition    Orientation Status (Cognition) oriented x 4  -MS       Row Name 08/13/23 1627          Safety Issues, Functional Mobility    Impairments Affecting Function (Mobility) endurance/activity tolerance;pain;strength;shortness of breath  -MS               User Key  (r) = Recorded By, (t) = Taken By, (c) = Cosigned By      Initials Name Provider Type    Racheal Guerra OT Occupational Therapist                     Mobility/ADL's       Row Name 08/13/23 1628          Bed Mobility    Bed Mobility sit-supine  -MS     Sit-Supine [place] (Bed Mobility) minimum assist (75% patient effort);2 person assist  -MS     Bed Mobility, Safety Issues decreased use of arms for pushing/pulling  -MS       Row Name 08/13/23 1628          Transfers    Transfers sit-stand transfer  -MS       Row Name 08/13/23 1628          Sit-Stand Transfer    Sit-Stand [place] (Transfers) standby assist  -MS       Row Name 08/13/23 1628          Mobility    Extremity Weight-bearing Status left upper extremity;right upper extremity  -MS     Left Upper Extremity (Weight-bearing Status) touch down weight-bearing (TDWB)  -MS     Right Upper Extremity (Weight-bearing Status) touch down weight-bearing (TDWB)  -MS               User Key  (r) = Recorded By, (t) = Taken By, (c) = Cosigned By      Initials Name Provider Type    Racheal Guerra OT Occupational Therapist                   Obj/Interventions       Row Name 08/13/23 1629          Sensory Assessment (Somatosensory)    Sensory Assessment (Somatosensory) sensation intact  -MS       Row Name 08/13/23 1629          Vision Assessment/Intervention    Visual Impairment/Limitations WNL  -MS       Row Name 08/13/23 1629          Range of Motion Comprehensive    Comment, General Range of Motion BUE not assessed d/t sternal precautions  -MS       Row Name 08/13/23 1629          Strength Comprehensive (MMT)    Comment,  General Manual Muscle Testing (MMT) Assessment BUE not assessed d/t sternal precautions  -MS       Row Name 08/13/23 1629          Balance    Balance Assessment sitting static balance;sitting dynamic balance;standing static balance;standing dynamic balance  -MS     Static Sitting Balance supervision  -MS     Dynamic Sitting Balance supervision  -MS     Position, Sitting Balance unsupported;sitting edge of bed  -MS     Static Standing Balance standby assist  -MS     Dynamic Standing Balance contact guard  -MS     Position/Device Used, Standing Balance unsupported  -MS               User Key  (r) = Recorded By, (t) = Taken By, (c) = Cosigned By      Initials Name Provider Type    Racheal Guerra, OT Occupational Therapist                   Goals/Plan       Row Name 08/13/23 1634          Bed Mobility Goal 1 (OT)    Activity/Assistive Device (Bed Mobility Goal 1, OT) bed mobility activities, all  -MS     [place]/Cues Needed (Bed Mobility Goal 1, OT) modified independence  -MS     Time Frame (Bed Mobility Goal 1, OT) long term goal (LTG);2 weeks  -MS     Progress/Outcomes (Bed Mobility Goal 1, OT) new goal  -MS       Row Name 08/13/23 1634          Transfer Goal 1 (OT)    Activity/Assistive Device (Transfer Goal 1, OT) transfers, all  -MS     [place]/Cues Needed (Transfer Goal 1, OT) modified independence  -MS     Time Frame (Transfer Goal 1, OT) long term goal (LTG);2 weeks  -MS     Progress/Outcome (Transfer Goal 1, OT) new goal  -MS       Row Name 08/13/23 1634          Bathing Goal 1 (OT)    Activity/Device (Bathing Goal 1, OT) bathing skills, all  -MS     [place] Level/Cues Needed (Bathing Goal 1, OT) supervision required  -MS     Time Frame (Bathing Goal 1, OT) long term goal (LTG);2 weeks  -MS     Progress/Outcomes (Bathing Goal 1, OT) new goal  -MS       Row Name 08/13/23 1634          Dressing Goal 1 (OT)    Activity/Device (Dressing Goal 1, OT) dressing skills, all  -MS      [place]/Cues Needed (Dressing Goal 1, OT) minimum assist (75% or more patient effort)  -MS     Time Frame (Dressing Goal 1, OT) long term goal (LTG);2 weeks  -MS     Progress/Outcome (Dressing Goal 1, OT) new goal  -MS       Row Name 08/13/23 1634          Toileting Goal 1 (OT)    Activity/Device (Toileting Goal 1, OT) toileting skills, all  -MS     [place] Level/Cues Needed (Toileting Goal 1, OT) supervision required  -MS     Time Frame (Toileting Goal 1, OT) long term goal (LTG);2 weeks  -MS     Progress/Outcome (Toileting Goal 1, OT) new goal  -MS       Row Name 08/13/23 1634          Therapy Assessment/Plan (OT)    Planned Therapy Interventions (OT) activity tolerance training;adaptive equipment training;BADL retraining;functional balance retraining;IADL retraining;occupation/activity based interventions;passive ROM/stretching;patient/caregiver education/training;transfer/mobility retraining;strengthening exercise;ROM/therapeutic exercise  -MS               User Key  (r) = Recorded By, (t) = Taken By, (c) = Cosigned By      Initials Name Provider Type    MS Maximino Racheal, OT Occupational Therapist                   Clinical Impression       Row Name 08/13/23 1629          Pain Assessment    Pretreatment Pain Rating 3/10  -MS     Posttreatment Pain Rating 3/10  -MS     Pain Location incisional  -MS     Pain Location - chest  -MS     Pain Intervention(s) Medication (See MAR);Repositioned;Emotional support  -MS       Row Name 08/13/23 9433          Plan of Care Review    Plan of Care Reviewed With patient  -MS     Progress no change  -MS     Outcome Evaluation Pt is a 63 y/o F admitted to [facility] 8/9/23 with chest pain. Pt POD 2 CABG x4 by Dr. Clements. CAD. PMHx significant for CAD. At baseline pt resides with spouse in [facility] with 0 REBECA. Pt typically independent with ADLs, no AD for mobility, drives and works. This date, pt with in bathroom with [facility] staff, no AD and on room air upon arrival and family at  bedside. Pt educated on sternal precautions and verbalized understanding. Pt comes to standing with SBA without AD, completes funcitonal mobility with CGA without AD and requires min A x2 for sit to supine. Pt desaturates to 88% with activity and placed on 1 L O2 and returned to 93%. Anticipate pt to progress well and likely to be safe to d/c home with family assist. OT will follow while admitted.  -MS       Row Name 08/13/23 1629          Therapy Assessment/Plan (OT)    Rehab Potential (OT) good, to achieve stated therapy goals  -MS     Criteria for Skilled Therapeutic Interventions Met (OT) yes;meets criteria;skilled treatment is necessary  -MS     Therapy Frequency (OT) 5 times/wk  -MS     Predicted Duration of Therapy Intervention (OT) until d/c  -MS       Row Name 08/13/23 1629          Therapy Plan Review/Discharge Plan (OT)    Anticipated Discharge Disposition (OT) home with assist  -MS       Row Name 08/13/23 1629          Vital Signs    Pre Systolic BP Rehab 110  -MS     Pre Treatment Diastolic BP 63  -MS     O2 Delivery Pre Treatment room air  -MS     Intra SpO2 (%) 88  -MS     O2 Delivery Intra Treatment room air  -MS     Post SpO2 (%) 93  -MS     O2 Delivery Post Treatment nasal cannula  -MS     Pre Patient Position Standing  -MS     Intra Patient Position Standing  -MS     Post Patient Position Supine  -MS       Row Name 08/13/23 1629          Positioning and Restraints    Pre-Treatment Position bathroom  -MS     Post Treatment Position bed  -MS     In Bed notified nsg;supine;call light within reach;encouraged to call for assist;with family/caregiver;with nsg  -MS               User Key  (r) = Recorded By, (t) = Taken By, (c) = Cosigned By      Initials Name Provider Type    Racheal Guerra, OT Occupational Therapist                   Outcome Measures       Row Name 08/13/23 4049          How much help from another is currently needed...    Putting on and taking off regular lower body clothing? 2  -MS      Bathing (including washing, rinsing, and drying) 2  -MS     Toileting (which includes using toilet bed pan or urinal) 3  -MS     Putting on and taking off regular upper body clothing 3  -MS     Taking care of personal grooming (such as brushing teeth) 4  -MS     Eating meals 4  -MS     AM-PAC 6 Clicks Score (OT) 18  -MS       Row Name 08/13/23 1634          Functional Assessment    Outcome Measure Options AM-PAC 6 Clicks Daily Activity (OT)  -MS               User Key  (r) = Recorded By, (t) = Taken By, (c) = Cosigned By      Initials Name Provider Type    MS Racheal Stanton OT Occupational Therapist                    Occupational Therapy Education       Title: PT OT SLP Therapies (Done)       Topic: Occupational Therapy (Done)       Point: ADL training (Done)       Description:   Instruct learner(s) on proper safety adaptation and remediation techniques during self care or transfers.   Instruct in proper use of assistive devices.                  Learning Progress Summary             Patient Acceptance, E,TB, VU by MS at 8/13/2023 1635                         Point: Precautions (Done)       Description:   Instruct learner(s) on prescribed precautions during self-care and functional transfers.                  Learning Progress Summary             Patient Acceptance, E,TB, VU by MS at 8/13/2023 1635                         Point: Body mechanics (Done)       Description:   Instruct learner(s) on proper positioning and spine alignment during self-care, functional mobility activities and/or exercises.                  Learning Progress Summary             Patient Acceptance, E,TB, VU by MS at 8/13/2023 1635                                         User Key       Initials Effective Dates Name Provider Type Discipline    MS 07/13/22 -  Rachela Stanton OT Occupational Therapist OT                  OT Recommendation and Plan  Planned Therapy Interventions (OT): activity tolerance training, adaptive equipment training,  BADL retraining, functional balance retraining, IADL retraining, occupation/activity based interventions, passive ROM/stretching, patient/caregiver education/training, transfer/mobility retraining, strengthening exercise, ROM/therapeutic exercise  Therapy Frequency (OT): 5 times/wk  Plan of Care Review  Plan of Care Reviewed With: patient  Progress: no change  Outcome Evaluation: Pt is a 63 y/o F admitted to [facility] 8/9/23 with chest pain. Pt POD 2 CABG x4 by Dr. Clements. CAD. PMHx significant for CAD. At baseline pt resides with spouse in [facility] with 0 REBECA. Pt typically independent with ADLs, no AD for mobility, drives and works. This date, pt with in bathroom with [facility]G staff, no AD and on room air upon arrival and family at bedside. Pt educated on sternal precautions and verbalized understanding. Pt comes to standing with SBA without AD, completes funcitonal mobility with CGA without AD and requires min A x2 for sit to supine. Pt desaturates to 88% with activity and placed on 1 L O2 and returned to 93%. Anticipate pt to progress well and likely to be safe to d/c home with family assist. OT will follow while admitted.     Time Calculation:                   Racheal Stanton OT  8/13/2023

## 2023-08-13 NOTE — PLAN OF CARE
Problem: Adult Inpatient Plan of Care  Goal: Plan of Care Review  Outcome: Ongoing, Progressing  Flowsheets (Taken 8/13/2023 0640)  Progress: improving  Plan of Care Reviewed With: patient  Goal: Patient-Specific Goal (Individualized)  Outcome: Ongoing, Progressing  Goal: Absence of Hospital-Acquired Illness or Injury  Outcome: Ongoing, Progressing  Intervention: Identify and Manage Fall Risk  Recent Flowsheet Documentation  Taken 8/13/2023 0400 by Liana Hdz RN  Safety Promotion/Fall Prevention:   safety round/check completed   fall prevention program maintained  Taken 8/13/2023 0200 by Liana Hdz RN  Safety Promotion/Fall Prevention:   safety round/check completed   fall prevention program maintained  Taken 8/13/2023 0000 by Liana Hdz RN  Safety Promotion/Fall Prevention:   safety round/check completed   fall prevention program maintained  Taken 8/12/2023 2200 by Liana Hdz RN  Safety Promotion/Fall Prevention:   safety round/check completed   fall prevention program maintained  Taken 8/12/2023 2000 by Liana Hdz RN  Safety Promotion/Fall Prevention:   safety round/check completed   room organization consistent   nonskid shoes/slippers when out of bed   lighting adjusted   fall prevention program maintained   clutter free environment maintained   activity supervised   assistive device/personal items within reach   mobility aid in reach  Intervention: Prevent Skin Injury  Recent Flowsheet Documentation  Taken 8/13/2023 0400 by Liana Hdz RN  Body Position: weight shifting  Taken 8/13/2023 0200 by Liana Hdz RN  Body Position:   position changed independently   weight shifting  Taken 8/13/2023 0000 by Liana Hdz RN  Body Position:   position changed independently   weight shifting  Taken 8/12/2023 2200 by Liana Hdz RN  Body Position:   turned   weight shifting   upper extremity elevated   supine, legs elevated  Taken 8/12/2023 2000 by Liana Hdz RN  Body Position:    position changed independently   weight shifting   upper extremity elevated   lower extremity elevated  Skin Protection:   tubing/devices free from skin contact   skin sealant/moisture barrier applied   silicone foam dressing in place   pulse oximeter probe site changed   incontinence pads utilized   adhesive use limited  Intervention: Prevent and Manage VTE (Venous Thromboembolism) Risk  Recent Flowsheet Documentation  Taken 8/13/2023 0400 by Liana Hdz RN  Activity Management: activity encouraged  Taken 8/12/2023 2000 by Liana Hdz RN  Activity Management:   ambulated in room   ambulated to bathroom   up in chair  VTE Prevention/Management: (off while up in chair)   bilateral   sequential compression devices off   patient refused intervention  Intervention: Prevent Infection  Recent Flowsheet Documentation  Taken 8/12/2023 2000 by Liana Hdz RN  Infection Prevention:   hand hygiene promoted   equipment surfaces disinfected   cohorting utilized  Goal: Optimal Comfort and Wellbeing  Outcome: Ongoing, Progressing  Intervention: Monitor Pain and Promote Comfort  Recent Flowsheet Documentation  Taken 8/13/2023 0048 by Liana Hdz RN  Pain Management Interventions: see MAR  Taken 8/12/2023 2200 by Liana Hdz RN  Pain Management Interventions: see MAR  Taken 8/12/2023 2100 by Liana Hdz RN  Pain Management Interventions:   see MAR   quiet environment facilitated   position adjusted   pillow support provided  Taken 8/12/2023 2000 by Liana Hdz RN  Pain Management Interventions:   position adjusted   pillow support provided  Intervention: Provide Person-Centered Care  Recent Flowsheet Documentation  Taken 8/13/2023 0000 by Liana Hdz RN  Trust Relationship/Rapport:   care explained   questions answered   questions encouraged  Taken 8/12/2023 2000 by Liana Hdz RN  Trust Relationship/Rapport:   care explained   questions answered   questions encouraged   reassurance provided    thoughts/feelings acknowledged  Goal: Readiness for Transition of Care  Outcome: Ongoing, Progressing     Problem: Fall Injury Risk  Goal: Absence of Fall and Fall-Related Injury  Outcome: Ongoing, Progressing  Intervention: Identify and Manage Contributors  Recent Flowsheet Documentation  Taken 8/12/2023 2200 by Liana Hdz RN  Medication Review/Management: medications reviewed  Taken 8/12/2023 2000 by Liana Hdz RN  Medication Review/Management:   medications reviewed   dosing adjusted  Intervention: Promote Injury-Free Environment  Recent Flowsheet Documentation  Taken 8/13/2023 0400 by Liana Hdz RN  Safety Promotion/Fall Prevention:   safety round/check completed   fall prevention program maintained  Taken 8/13/2023 0200 by Liana Hdz RN  Safety Promotion/Fall Prevention:   safety round/check completed   fall prevention program maintained  Taken 8/13/2023 0000 by Liana Hdz RN  Safety Promotion/Fall Prevention:   safety round/check completed   fall prevention program maintained  Taken 8/12/2023 2200 by Liana Hdz RN  Safety Promotion/Fall Prevention:   safety round/check completed   fall prevention program maintained  Taken 8/12/2023 2000 by Liana Hdz RN  Safety Promotion/Fall Prevention:   safety round/check completed   room organization consistent   nonskid shoes/slippers when out of bed   lighting adjusted   fall prevention program maintained   clutter free environment maintained   activity supervised   assistive device/personal items within reach   mobility aid in reach     Problem: Activity Intolerance (Cardiovascular Surgery)  Goal: Improved Activity Tolerance  Outcome: Ongoing, Progressing     Problem: Adjustment to Surgery (Cardiovascular Surgery)  Goal: Optimal Coping with Heart Surgery  Outcome: Ongoing, Progressing     Problem: Bleeding (Cardiovascular Surgery)  Goal: Bleeding (Cardiovascular Surgery)  Outcome: Ongoing, Progressing     Problem: Bowel Motility Impaired  (Cardiovascular Surgery)  Goal: Effective Bowel Elimination (Cardiovascular Surgery)  Outcome: Ongoing, Progressing  Intervention: Enhance Bowel Motility and Elimination  Recent Flowsheet Documentation  Taken 8/12/2023 2000 by Liana Hdz RN  Bowel Elimination Management:   toileting offered   sitting position facilitated  Bowel Motility Enhancement:   ambulation promoted   oral intake encouraged   fluid intake encouraged     Problem: Cardiac Function Impaired (Cardiovascular Surgery)  Goal: Effective Cardiac Function  Outcome: Ongoing, Progressing     Problem: Cerebral Tissue Perfusion (Cardiovascular Surgery)  Goal: Optimal Cerebral Tissue Perfusion (Cardiovascular Surgery)  Outcome: Ongoing, Progressing  Intervention: Protect and Optimize Cerebral Perfusion  Recent Flowsheet Documentation  Taken 8/13/2023 0400 by Liana Hdz RN  Head of Bed (HOB) Positioning: HOB at 30 degrees  Taken 8/13/2023 0200 by Liana Hdz RN  Head of Bed (HOB) Positioning: HOB at 30 degrees  Taken 8/13/2023 0000 by Liana Hdz RN  Head of Bed (HOB) Positioning: HOB at 30 degrees  Taken 8/12/2023 2200 by Liana Hdz RN  Head of Bed (HOB) Positioning: HOB at 30 degrees     Problem: Fluid and Electrolyte Imbalance (Cardiovascular Surgery)  Goal: Fluid and Electrolyte Balance (Cardiovascular Surgery)  Outcome: Ongoing, Progressing     Problem: Glycemic Control Impaired (Cardiovascular Surgery)  Goal: Blood Glucose Level Within Targeted Range (Cardiovascular Surgery)  Outcome: Ongoing, Progressing     Problem: Infection (Cardiovascular Surgery)  Goal: Absence of Infection Signs and Symptoms  Outcome: Ongoing, Progressing  Intervention: Prevent or Manage Infection  Recent Flowsheet Documentation  Taken 8/12/2023 2000 by Liana Hdz RN  Infection Prevention:   hand hygiene promoted   equipment surfaces disinfected   cohorting utilized     Problem: Ongoing Anesthesia Effects (Cardiovascular Surgery)  Goal: Anesthesia/Sedation  Recovery  Outcome: Ongoing, Progressing  Intervention: Optimize Anesthesia Recovery  Recent Flowsheet Documentation  Taken 8/13/2023 0400 by Liana Hdz RN  Safety Promotion/Fall Prevention:   safety round/check completed   fall prevention program maintained  Taken 8/13/2023 0323 by Liana Hdz RN  Patient Tolerance (IS): fair  Level Incentive Spirometer (mL): 750  Number of Repetitions (IS): 5  Taken 8/13/2023 0200 by Liana Hdz RN  Safety Promotion/Fall Prevention:   safety round/check completed   fall prevention program maintained  Taken 8/13/2023 0000 by Liana Hdz RN  Safety Promotion/Fall Prevention:   safety round/check completed   fall prevention program maintained  Taken 8/12/2023 2200 by Liana Hdz RN  Safety Promotion/Fall Prevention:   safety round/check completed   fall prevention program maintained  Taken 8/12/2023 2123 by Liana Hdz RN  Administration (IS):   instruction provided, follow-up   self-administered  Level Incentive Spirometer (mL): 750  Number of Repetitions (IS): 5  Taken 8/12/2023 2000 by Liana Hdz RN  Patient Tolerance (IS): fair  Safety Promotion/Fall Prevention:   safety round/check completed   room organization consistent   nonskid shoes/slippers when out of bed   lighting adjusted   fall prevention program maintained   clutter free environment maintained   activity supervised   assistive device/personal items within reach   mobility aid in reach  Administration (IS):   instruction provided, follow-up   mouthpiece utilized   self-administered  Level Incentive Spirometer (mL): 1000  Number of Repetitions (IS): 6     Problem: Pain (Cardiovascular Surgery)  Goal: Acceptable Pain Control  Outcome: Ongoing, Progressing  Intervention: Prevent or Manage Pain  Recent Flowsheet Documentation  Taken 8/13/2023 0048 by Liana Hdz RN  Pain Management Interventions: see MAR  Taken 8/12/2023 2200 by Liana Hdz RN  Pain Management Interventions: see MAR  Taken  8/12/2023 2100 by Liana Hdz RN  Pain Management Interventions:   see MAR   quiet environment facilitated   position adjusted   pillow support provided  Taken 8/12/2023 2000 by Liana Hdz RN  Pain Management Interventions:   position adjusted   pillow support provided     Problem: Postoperative Nausea and Vomiting (Cardiovascular Surgery)  Goal: Nausea and Vomiting Relief (Cardiovascular Surgery)  Outcome: Ongoing, Progressing     Problem: Postoperative Urinary Retention (Cardiovascular Surgery)  Goal: Effective Urinary Elimination (Cardiovascular Surgery)  Outcome: Ongoing, Progressing     Problem: Respiratory Compromise (Cardiovascular Surgery)  Goal: Effective Oxygenation and Ventilation (Cardiovascular Surgery)  Outcome: Ongoing, Progressing  Intervention: Promote Airway Secretion Clearance  Recent Flowsheet Documentation  Taken 8/13/2023 0323 by Liana Hzd RN  Patient Tolerance (IS): fair  Level Incentive Spirometer (mL): 750  Number of Repetitions (IS): 5  Taken 8/13/2023 0000 by Liana Hdz RN  Cough And Deep Breathing: done independently per patient  Taken 8/12/2023 2123 by Liana Hdz RN  Administration (IS):   instruction provided, follow-up   self-administered  Level Incentive Spirometer (mL): 750  Number of Repetitions (IS): 5  Taken 8/12/2023 2000 by Liana Hdz RN  Patient Tolerance (IS): fair  Administration (IS):   instruction provided, follow-up   mouthpiece utilized   self-administered  Level Incentive Spirometer (mL): 1000  Cough And Deep Breathing: done with encouragement  Number of Repetitions (IS): 6     Problem: Skin Injury Risk Increased  Goal: Skin Health and Integrity  Outcome: Ongoing, Progressing  Intervention: Optimize Skin Protection  Recent Flowsheet Documentation  Taken 8/13/2023 0400 by Liana Hdz RN  Head of Bed (HOB) Positioning: HOB at 30 degrees  Taken 8/13/2023 0200 by Liana Hdz RN  Head of Bed (HOB) Positioning: HOB at 30 degrees  Taken 8/13/2023  0000 by Liana Hdz, RN  Head of Bed (\[facility]\"") Positioning: HOB at 30 degrees  Taken 8/12/2023 2200 by Liana Hdz RN  Head of Bed (\[facility]\"") Positioning: HOB at 30 degrees  Taken 8/12/2023 2000 by Liana Hdz, RN  Pressure Reduction Techniques:   weight shift assistance provided   frequent weight shift encouraged  Pressure Reduction Devices: pressure-redistributing mattress utilized  Skin Protection:   tubing/devices free from skin contact   skin sealant/moisture barrier applied   silicone foam dressing in place   pulse oximeter probe site changed   incontinence pads utilized   adhesive use limited   Goal Outcome Evaluation:  Plan of Care Reviewed With: patient        Progress: improving

## 2023-08-13 NOTE — NURSING NOTE
Pt does not want to get up in chair this early this morning due to pain. Pain treated with PRN pain medication and improved, she said she will try to get up before breakfast again.

## 2023-08-14 ENCOUNTER — APPOINTMENT (OUTPATIENT)
Dept: GENERAL RADIOLOGY | Facility: HOSPITAL | Age: 63
DRG: 236 | End: 2023-08-14
Payer: COMMERCIAL

## 2023-08-14 LAB
ALBUMIN SERPL-MCNC: 3.8 G/DL (ref 3.5–5.2)
ALBUMIN/GLOB SERPL: 1.5 G/DL
ALP SERPL-CCNC: 72 U/L (ref 39–117)
ALT SERPL W P-5'-P-CCNC: 19 U/L (ref 1–33)
ANION GAP SERPL CALCULATED.3IONS-SCNC: 9 MMOL/L (ref 5–15)
AST SERPL-CCNC: 27 U/L (ref 1–32)
BILIRUB SERPL-MCNC: 0.7 MG/DL (ref 0–1.2)
BUN SERPL-MCNC: 20 MG/DL (ref 8–23)
BUN/CREAT SERPL: 29 (ref 7–25)
CALCIUM SPEC-SCNC: 9.2 MG/DL (ref 8.6–10.5)
CHLORIDE SERPL-SCNC: 100 MMOL/L (ref 98–107)
CO2 SERPL-SCNC: 28 MMOL/L (ref 22–29)
CREAT SERPL-MCNC: 0.69 MG/DL (ref 0.57–1)
DEPRECATED RDW RBC AUTO: 45.5 FL (ref 37–54)
EGFRCR SERPLBLD CKD-EPI 2021: 99.5 ML/MIN/1.73
ERYTHROCYTE [DISTWIDTH] IN BLOOD BY AUTOMATED COUNT: 13.5 % (ref 12.3–15.4)
GLOBULIN UR ELPH-MCNC: 2.6 GM/DL
GLUCOSE BLDC GLUCOMTR-MCNC: 110 MG/DL (ref 70–105)
GLUCOSE BLDC GLUCOMTR-MCNC: 119 MG/DL (ref 70–105)
GLUCOSE BLDC GLUCOMTR-MCNC: 147 MG/DL (ref 70–105)
GLUCOSE BLDC GLUCOMTR-MCNC: 88 MG/DL (ref 70–105)
GLUCOSE SERPL-MCNC: 107 MG/DL (ref 65–99)
HCT VFR BLD AUTO: 29.1 % (ref 34–46.6)
HGB BLD-MCNC: 9.7 G/DL (ref 12–15.9)
MCH RBC QN AUTO: 30.1 PG (ref 26.6–33)
MCHC RBC AUTO-ENTMCNC: 33.4 G/DL (ref 31.5–35.7)
MCV RBC AUTO: 90.1 FL (ref 79–97)
PLATELET # BLD AUTO: 189 10*3/MM3 (ref 140–450)
PMV BLD AUTO: 8.7 FL (ref 6–12)
POTASSIUM SERPL-SCNC: 3.3 MMOL/L (ref 3.5–5.2)
PROT SERPL-MCNC: 6.4 G/DL (ref 6–8.5)
QT INTERVAL: 374 MS
RBC # BLD AUTO: 3.23 10*6/MM3 (ref 3.77–5.28)
SODIUM SERPL-SCNC: 137 MMOL/L (ref 136–145)
WBC NRBC COR # BLD: 12.4 10*3/MM3 (ref 3.4–10.8)

## 2023-08-14 PROCEDURE — 94762 N-INVAS EAR/PLS OXIMTRY CONT: CPT

## 2023-08-14 PROCEDURE — 94799 UNLISTED PULMONARY SVC/PX: CPT

## 2023-08-14 PROCEDURE — 97116 GAIT TRAINING THERAPY: CPT | Performed by: PHYSICAL THERAPIST

## 2023-08-14 PROCEDURE — 85027 COMPLETE CBC AUTOMATED: CPT

## 2023-08-14 PROCEDURE — 94640 AIRWAY INHALATION TREATMENT: CPT

## 2023-08-14 PROCEDURE — 97535 SELF CARE MNGMENT TRAINING: CPT

## 2023-08-14 PROCEDURE — 71045 X-RAY EXAM CHEST 1 VIEW: CPT

## 2023-08-14 PROCEDURE — 25010000002 MAGNESIUM SULFATE 2 GM/50ML SOLUTION

## 2023-08-14 PROCEDURE — 82948 REAGENT STRIP/BLOOD GLUCOSE: CPT

## 2023-08-14 PROCEDURE — 97110 THERAPEUTIC EXERCISES: CPT

## 2023-08-14 PROCEDURE — 25010000002 ENOXAPARIN PER 10 MG

## 2023-08-14 PROCEDURE — 80053 COMPREHEN METABOLIC PANEL: CPT

## 2023-08-14 RX ORDER — POLYETHYLENE GLYCOL 3350 17 G/17G
17 POWDER, FOR SOLUTION ORAL 2 TIMES DAILY
Status: DISCONTINUED | OUTPATIENT
Start: 2023-08-14 | End: 2023-08-15 | Stop reason: HOSPADM

## 2023-08-14 RX ORDER — POTASSIUM CHLORIDE 20 MEQ/1
40 TABLET, EXTENDED RELEASE ORAL 2 TIMES DAILY
Status: DISCONTINUED | OUTPATIENT
Start: 2023-08-14 | End: 2023-08-15

## 2023-08-14 RX ORDER — BUMETANIDE 1 MG/1
2 TABLET ORAL DAILY
Status: DISCONTINUED | OUTPATIENT
Start: 2023-08-15 | End: 2023-08-14

## 2023-08-14 RX ORDER — POTASSIUM CHLORIDE 20 MEQ/1
20 TABLET, EXTENDED RELEASE ORAL DAILY
Status: DISCONTINUED | OUTPATIENT
Start: 2023-08-14 | End: 2023-08-14

## 2023-08-14 RX ORDER — BUMETANIDE 0.25 MG/ML
2 INJECTION INTRAMUSCULAR; INTRAVENOUS EVERY 12 HOURS
Status: DISCONTINUED | OUTPATIENT
Start: 2023-08-14 | End: 2023-08-14

## 2023-08-14 RX ORDER — AMOXICILLIN 250 MG
2 CAPSULE ORAL 2 TIMES DAILY
Status: DISCONTINUED | OUTPATIENT
Start: 2023-08-14 | End: 2023-08-15 | Stop reason: HOSPADM

## 2023-08-14 RX ORDER — POLYETHYLENE GLYCOL 3350 17 G/17G
17 POWDER, FOR SOLUTION ORAL DAILY
Status: DISCONTINUED | OUTPATIENT
Start: 2023-08-14 | End: 2023-08-14

## 2023-08-14 RX ORDER — IPRATROPIUM BROMIDE AND ALBUTEROL SULFATE 2.5; .5 MG/3ML; MG/3ML
3 SOLUTION RESPIRATORY (INHALATION)
Status: DISCONTINUED | OUTPATIENT
Start: 2023-08-14 | End: 2023-08-15 | Stop reason: HOSPADM

## 2023-08-14 RX ORDER — AMOXICILLIN 250 MG
2 CAPSULE ORAL 2 TIMES DAILY PRN
Status: DISCONTINUED | OUTPATIENT
Start: 2023-08-14 | End: 2023-08-15 | Stop reason: HOSPADM

## 2023-08-14 RX ORDER — MAGNESIUM SULFATE HEPTAHYDRATE 40 MG/ML
2 INJECTION, SOLUTION INTRAVENOUS EVERY 8 HOURS
Status: COMPLETED | OUTPATIENT
Start: 2023-08-14 | End: 2023-08-15

## 2023-08-14 RX ORDER — BUMETANIDE 1 MG/1
1 TABLET ORAL DAILY
Status: DISCONTINUED | OUTPATIENT
Start: 2023-08-15 | End: 2023-08-15

## 2023-08-14 RX ADMIN — POTASSIUM CHLORIDE 40 MEQ: 1500 TABLET, EXTENDED RELEASE ORAL at 14:38

## 2023-08-14 RX ADMIN — Medication 12.5 MG: at 09:18

## 2023-08-14 RX ADMIN — BUMETANIDE 2 MG: 0.25 INJECTION INTRAMUSCULAR; INTRAVENOUS at 09:16

## 2023-08-14 RX ADMIN — ASPIRIN 81 MG: 81 TABLET, COATED ORAL at 09:18

## 2023-08-14 RX ADMIN — HYDROCODONE BITARTRATE AND ACETAMINOPHEN 1 TABLET: 10; 325 TABLET ORAL at 05:40

## 2023-08-14 RX ADMIN — IPRATROPIUM BROMIDE AND ALBUTEROL SULFATE 3 ML: .5; 3 SOLUTION RESPIRATORY (INHALATION) at 18:53

## 2023-08-14 RX ADMIN — CHLORHEXIDINE GLUCONATE 15 ML: 1.2 SOLUTION ORAL at 09:16

## 2023-08-14 RX ADMIN — IPRATROPIUM BROMIDE AND ALBUTEROL SULFATE 3 ML: .5; 3 SOLUTION RESPIRATORY (INHALATION) at 15:20

## 2023-08-14 RX ADMIN — METOPROLOL TARTRATE 6.25 MG: 25 TABLET, FILM COATED ORAL at 20:29

## 2023-08-14 RX ADMIN — GUAIFENESIN 600 MG: 600 TABLET, EXTENDED RELEASE ORAL at 20:29

## 2023-08-14 RX ADMIN — CHLORHEXIDINE GLUCONATE 15 ML: 1.2 SOLUTION ORAL at 20:29

## 2023-08-14 RX ADMIN — PAROXETINE HYDROCHLORIDE 20 MG: 20 TABLET, FILM COATED ORAL at 09:18

## 2023-08-14 RX ADMIN — HYDROCODONE BITARTRATE AND ACETAMINOPHEN 1 TABLET: 10; 325 TABLET ORAL at 16:39

## 2023-08-14 RX ADMIN — MAGNESIUM SULFATE HEPTAHYDRATE 2 G: 40 INJECTION, SOLUTION INTRAVENOUS at 20:29

## 2023-08-14 RX ADMIN — MAGNESIUM SULFATE HEPTAHYDRATE 2 G: 40 INJECTION, SOLUTION INTRAVENOUS at 11:18

## 2023-08-14 RX ADMIN — PANTOPRAZOLE SODIUM 40 MG: 40 TABLET, DELAYED RELEASE ORAL at 09:18

## 2023-08-14 RX ADMIN — HYDROCODONE BITARTRATE AND ACETAMINOPHEN 1 TABLET: 10; 325 TABLET ORAL at 09:52

## 2023-08-14 RX ADMIN — MUPIROCIN: 20 OINTMENT TOPICAL at 20:30

## 2023-08-14 RX ADMIN — HYDROCODONE BITARTRATE AND ACETAMINOPHEN 1 TABLET: 10; 325 TABLET ORAL at 20:29

## 2023-08-14 RX ADMIN — POTASSIUM CHLORIDE 20 MEQ: 1500 TABLET, EXTENDED RELEASE ORAL at 11:18

## 2023-08-14 RX ADMIN — HYDROCODONE BITARTRATE AND ACETAMINOPHEN 1 TABLET: 10; 325 TABLET ORAL at 01:22

## 2023-08-14 RX ADMIN — GUAIFENESIN 600 MG: 600 TABLET, EXTENDED RELEASE ORAL at 09:18

## 2023-08-14 RX ADMIN — ENOXAPARIN SODIUM 40 MG: 100 INJECTION SUBCUTANEOUS at 16:39

## 2023-08-14 RX ADMIN — POTASSIUM CHLORIDE 40 MEQ: 1500 TABLET, EXTENDED RELEASE ORAL at 20:50

## 2023-08-14 RX ADMIN — ATORVASTATIN CALCIUM 40 MG: 40 TABLET, FILM COATED ORAL at 20:29

## 2023-08-14 NOTE — THERAPY TREATMENT NOTE
"Subjective: Pt agreeable to therapeutic plan of care.     Objective:     Bed mobility - N/A or Not attempted.  Transfers - CGA  Ambulation - 250 feet SBA and with rolling walker  *Gait belt applied and non-skid socks worn during treatment.     Phase 1 Cardiac Rehab Initiated    Sitting tolerance: >10min and supported  Standing tolerance: 5-10min and supported    Precautions:  Mid-sternal incision; avoid scapular retraction and depression.  Cardiovascular impairment post-sx; encourage energy conservation strategies.    MET level equivalent: 2.0-3.0 (Unlimited sitting, ambulation on level ground <2mph, light housework)    Vitals: WNL    Pain: 2 VAS   Location: sternal/incisional   Intervention for pain: Repositioned and Therapeutic Presence    Education: Provided education on the importance of mobility in the acute care setting, Verbal/Tactile Cues, Gait Training, and Post-Op Precautions    Assessment: Sara Chaves presents with functional mobility impairments which indicate the need for skilled intervention. Tolerating session today without incident.  Pt showing improved activity tolerance, and has increased her ambulation distance.  She was able to recall all sternal precautions. Will continue to follow and progress as tolerated.     Plan/Recommendations:   Low Intensity Therapy recommended post-acute care - This is recommended as therapy feels this patient would require 2-3 visits per week. OP or HH would be the best option depending on patient's home bound status. Consider, if the patient has other  \"skilled\" needs such as wounds, IV antibiotics, etc. Combined with \"low intensity\" could also equate to a SNF. If patient is medically complex, consider LTAC.. Pt requires no DME at discharge.     Pt desires Home with family assist at discharge. Pt cooperative; agreeable to therapeutic recommendations and plan of care.         Basic Mobility 6-click:  Rollin = Total, A lot = 2, A little = 3; 4 = " None  Supine>Sit:   1 = Total, A lot = 2, A little = 3; 4 = None   Sit>Stand with arms:  1 = Total, A lot = 2, A little = 3; 4 = None  Bed>Chair:   1 = Total, A lot = 2, A little = 3; 4 = None  Ambulate in room:  1 = Total, A lot = 2, A little = 3; 4 = None  3-5 Steps with railin = Total, A lot = 2, A little = 3; 4 = None  Score: 18    Post-Tx Position: Up in Chair and Call light and personal items within reach  PPE: gloves

## 2023-08-14 NOTE — PROGRESS NOTES
"   LOS: 5 days   Patient Care Team:  Provider, No Known as PCP - Jacob Webber MD as Consulting Physician (Cardiology)    Chief Complaint: Post op       Subjective:  Symptoms:  Stable.  No shortness of breath, chest pain or chest pressure.    Diet:  Adequate intake.  No nausea or vomiting.    Activity level: Impaired due to weakness.    Pain:  She reports no pain.        Vital Signs  Temp:  [97.5 øF (36.4 øC)-98.5 øF (36.9 øC)] 98.1 øF (36.7 øC)  Heart Rate:  [72-92] 82  Resp:  [16-31] 20  BP: ()/(46-76) 108/63  Body mass index is 34.31 kg/mý.    Intake/Output Summary (Last 24 hours) at 8/14/2023 1050  Last data filed at 8/14/2023 0842  Gross per 24 hour   Intake 1465 ml   Output 2200 ml   Net -735 ml     I/O this shift:  In: 240 [P.O.:240]  Out: -       Wt Readings from Last 3 Encounters:   08/14/23 85.1 kg (187 lb 9.6 oz)         Objective:  General Appearance:  Comfortable and in no acute distress.    Vital signs: (most recent): Blood pressure 108/63, pulse 82, temperature 98.1 øF (36.7 øC), temperature source Oral, resp. rate 20, height 157.5 cm (62\"), weight 85.1 kg (187 lb 9.6 oz), SpO2 95 %.  Vital signs are normal.  No fever.    Output: Producing urine and no stool output.    HEENT: Normal HEENT exam.    Lungs:  Normal effort and normal respiratory rate.  There are decreased breath sounds.    Heart: Normal rate.  Regular rhythm.    Abdomen: Abdomen is soft and non-distended.  Bowel sounds are normal.     Extremities: Normal range of motion.  There is dependent edema.    Pulses: Distal pulses are intact.    Neurological: Patient is alert and oriented to person, place and time.    Pupils:  Pupils are equal, round, and reactive to light.    Skin:  Warm and dry.              Results Review:        WBC No results found for: WBCS   HGB Hemoglobin   Date Value Ref Range Status   08/14/2023 9.7 (L) 12.0 - 15.9 g/dL Final   08/13/2023 8.5 (L) 12.0 - 15.9 g/dL Final   08/13/2023 8.8 (L) " 12.0 - 15.9 g/dL Final   08/12/2023 9.8 (L) 12.0 - 15.9 g/dL Final   08/11/2023 10.6 (L) 12.0 - 17.0 g/dL Final   08/11/2023 11.0 (L) 12.0 - 17.0 g/dL Final   08/11/2023 10.7 (L) 12.0 - 15.9 g/dL Final   08/11/2023 11.0 (L) 12.0 - 17.0 g/dL Final   08/11/2023 10.5 (L) 12.0 - 17.0 g/dL Final   08/11/2023 11.5 (L) 12.0 - 17.0 g/dL Final   08/11/2023 12.3 12.0 - 17.0 g/dL Final   08/11/2023 12.5 12.0 - 15.9 g/dL Final      HCT Hematocrit   Date Value Ref Range Status   08/14/2023 29.1 (L) 34.0 - 46.6 % Final   08/13/2023 25.7 (L) 34.0 - 46.6 % Final   08/13/2023 26.5 (L) 34.0 - 46.6 % Final   08/12/2023 29.0 (L) 34.0 - 46.6 % Final   08/11/2023 31 (L) 38 - 51 % Final   08/11/2023 32 (L) 38 - 51 % Final   08/11/2023 32.0 (L) 34.0 - 46.6 % Final   08/11/2023 32 (L) 38 - 51 % Final   08/11/2023 31 (L) 38 - 51 % Final   08/11/2023 34 (L) 38 - 51 % Final   08/11/2023 36 (L) 38 - 51 % Final   08/11/2023 37.7 34.0 - 46.6 % Final      Platelets No results found for: LABPLAT     PT/INR:    Protime   Date Value Ref Range Status   08/12/2023 10.8 9.6 - 11.7 Seconds Final   08/11/2023 11.6 9.6 - 11.7 Seconds Final   /  INR   Date Value Ref Range Status   08/12/2023 1.01 0.93 - 1.10 Final   08/11/2023 1.09 0.93 - 1.10 Final       Sodium Sodium   Date Value Ref Range Status   08/14/2023 137 136 - 145 mmol/L Final   08/13/2023 138 136 - 145 mmol/L Final   08/13/2023 136 136 - 145 mmol/L Final   08/12/2023 144 136 - 145 mmol/L Final   08/11/2023 146 (H) 136 - 145 mmol/L Final   08/11/2023 141 136 - 145 mmol/L Final      Potassium Potassium   Date Value Ref Range Status   08/14/2023 3.3 (L) 3.5 - 5.2 mmol/L Final   08/13/2023 3.7 3.5 - 5.2 mmol/L Final   08/13/2023 3.8 3.5 - 5.2 mmol/L Final   08/13/2023 3.4 (L) 3.5 - 5.2 mmol/L Final   08/12/2023 4.3 3.5 - 5.2 mmol/L Final   08/11/2023 4.0 3.5 - 5.2 mmol/L Final   08/11/2023 4.2 3.5 - 5.2 mmol/L Final     Comment:     Slight hemolysis detected by analyzer. Results may be affected.       Chloride Chloride   Date Value Ref Range Status   08/14/2023 100 98 - 107 mmol/L Final   08/13/2023 102 98 - 107 mmol/L Final   08/13/2023 102 98 - 107 mmol/L Final   08/12/2023 111 (H) 98 - 107 mmol/L Final   08/11/2023 112 (H) 98 - 107 mmol/L Final   08/11/2023 110 (H) 98 - 107 mmol/L Final      Bicarbonate No results found for: PLASMABICARB   BUN BUN   Date Value Ref Range Status   08/14/2023 20 8 - 23 mg/dL Final   08/13/2023 17 8 - 23 mg/dL Final   08/13/2023 17 8 - 23 mg/dL Final   08/12/2023 12 8 - 23 mg/dL Final   08/11/2023 11 8 - 23 mg/dL Final   08/11/2023 12 8 - 23 mg/dL Final      Creatinine Creatinine   Date Value Ref Range Status   08/14/2023 0.69 0.57 - 1.00 mg/dL Final   08/13/2023 0.73 0.57 - 1.00 mg/dL Final   08/13/2023 0.73 0.57 - 1.00 mg/dL Final   08/12/2023 0.60 0.57 - 1.00 mg/dL Final   08/11/2023 0.59 0.57 - 1.00 mg/dL Final   08/11/2023 0.65 0.57 - 1.00 mg/dL Final      Calcium Calcium   Date Value Ref Range Status   08/14/2023 9.2 8.6 - 10.5 mg/dL Final   08/13/2023 8.9 8.6 - 10.5 mg/dL Final   08/13/2023 8.7 8.6 - 10.5 mg/dL Final   08/12/2023 7.8 (L) 8.6 - 10.5 mg/dL Final   08/11/2023 8.0 (L) 8.6 - 10.5 mg/dL Final   08/11/2023 8.4 (L) 8.6 - 10.5 mg/dL Final      Magnesium Magnesium   Date Value Ref Range Status   08/12/2023 2.7 (H) 1.6 - 2.4 mg/dL Final   08/11/2023 2.7 (H) 1.6 - 2.4 mg/dL Final   08/11/2023 2.8 (H) 1.6 - 2.4 mg/dL Final         .imag    aspirin, 81 mg, Oral, Daily  atorvastatin, 40 mg, Oral, Nightly  bumetanide, 2 mg, Intravenous, Q12H  chlorhexidine, 15 mL, Mouth/Throat, Q12H  enoxaparin, 40 mg, Subcutaneous, Daily  guaiFENesin, 600 mg, Oral, Q12H  insulin lispro, 2-7 Units, Subcutaneous, 4x Daily AC & at Bedtime  metoprolol tartrate, 12.5 mg, Oral, Q12H  mupirocin, , Each Nare, BID  pantoprazole, 40 mg, Oral, Daily  PARoxetine, 20 mg, Oral, Daily  polyethylene glycol, 17 g, Oral, Daily  potassium chloride, 20 mEq, Oral, Daily  senna-docusate sodium, 2 tablet,  Oral, Nightly               Coronary artery disease involving native coronary artery of native heart with unstable angina pectoris      Assessment & Plan    -NSTEMI with severe MV CAD, s/p CABG x 4 (LIMA to LAD, SVG to Diagonal, SVG to OM, SVG to RCA)--POD#1, Dr. Clements  -HTN  -HLD--ASA/statin  -Tobacco abuse--encourage cessation  - Post op anemia, expected ABL  - Leucocytosis, reactive    Looks good; resting in the chair  EKG NSR, rate 70's; b/p is soft but adequate--on low dose beta blocker with hold parameters  CXR reviewed; on and off 1-2 L NC--encourage pulmonary toilet with IS, add flutter, mucinex, and duo nebs. Check overnight oximetry tonight.   On IV Bumex TID, diuresing well, -2.5L last night; weight is down 5lbs overnight,but remains up 4 lbs from preop weight. Continue IV bumex and decrease to BID. Replete electrolytes.   D/c AV wires  Mobilize--PT/OT  Increase bowel regimen  Likely home in the next 1-2 days  Continue routine care     Zayda De Santiago, APRN  08/14/23  10:50 EDT

## 2023-08-14 NOTE — PLAN OF CARE
"Goal Outcome Evaluation:           Assessment: Sara Chaves presents with functional mobility impairments which indicate the need for skilled intervention. Tolerating session today without incident.  Pt showing improved activity tolerance, and has increased her ambulation distance.  She was able to recall all sternal precautions. Will continue to follow and progress as tolerated.     Plan/Recommendations:   Low Intensity Therapy recommended post-acute care - This is recommended as therapy feels this patient would require 2-3 visits per week. OP or HH would be the best option depending on patient's home bound status. Consider, if the patient has other  \"skilled\" needs such as wounds, IV antibiotics, etc. Combined with \"low intensity\" could also equate to a SNF. If patient is medically complex, consider LTAC.. Pt requires no DME at discharge.     Pt desires Home with family assist at discharge. Pt cooperative; agreeable to therapeutic recommendations and plan of care.                "

## 2023-08-14 NOTE — CASE MANAGEMENT/SOCIAL WORK
Continued Stay Note  [facility]     Patient Name: Sara Chaves  MRN: [id number]  Today's Date: 8/14/2023    Admit Date: 8/9/2023    Plan: DC Plan: Return with spouse, family assist per PT. Needs PCP f/u appt. Check overnight oximetry results on 8/5.   Discharge Plan       Row Name 08/14/23 1721       Plan    Plan DC Plan: Return with spouse, family assist per PT. Needs PCP f/u appt. Check overnight oximetry results on 8/5.    Plan Comments Spouse for transport.             Chart review only.         Expected Discharge Date and Time       Expected Discharge Date Expected Discharge Time    Aug 15, 2023               Evy Goel RN

## 2023-08-14 NOTE — PLAN OF CARE
Sara Chaves presents with ADL impairments affecting function including endurance / activity tolerance, pain, and strength. OT reviewed Cardiac Rehab HEP. Pt completes 1 set x 10 reps of each exercise with fair understanding. Encouraged to complete 3x/daily to facilitate increased activity tolerance. Pt will require additional education to ensure carryover.  CGA for UB dressing for compensatory strategies. LB dressing with CGA - Min A for threading. Demonstrated functioning below baseline abilities indicate the need for continued skilled intervention while inpatient. Tolerating session today without incident. Will continue to follow and progress as tolerated.    (3) adequate

## 2023-08-14 NOTE — THERAPY TREATMENT NOTE
"Subjective: Pt agreeable to therapeutic plan of care.  Cognition: oriented to Person, Place, Time, and Situation    Objective: Patient recalls 3/3 sternal precautions, requiring min verbal cuing for precautions.       Bed Mobility: Min-A   Functional Transfers: CGA     Balance: sitting EOB SBA  Functional Ambulation: N/A or Not attempted.    Upper Body Dressing: Supervision  ADL Position: supported sitting  ADL Comments: Heart Hugger    Lower Body Dressing: CGA  ADL Position: supported sitting  ADL Comments: difficulty with threading.    Therapeutic Exercise -  OT reviewed Cardiac Rehab HEP. Pt completes 1 set x 10 reps of each exercise with fair understanding. Encouraged to complete 3x/daily to facilitate increased activity tolerance. Pt will require additional education to ensure carryover.     Vitals: WNL    Pain: 2 VAS  Location: chest discomfort  Interventions for pain: Repositioned  Education: ADL training      Assessment: Sara Chaves presents with ADL impairments affecting function including endurance / activity tolerance, pain, and strength. OT reviewed Cardiac Rehab HEP. Pt completes 1 set x 10 reps of each exercise with fair understanding. Encouraged to complete 3x/daily to facilitate increased activity tolerance. Pt will require additional education to ensure carryover.  CGA for UB dressing for compensatory strategies. LB dressing with CGA - Min A for threading. Demonstrated functioning below baseline abilities indicate the need for continued skilled intervention while inpatient. Tolerating session today without incident. Will continue to follow and progress as tolerated.     Plan/Recommendations:   Low Intensity Therapy recommended post-acute care - This is recommended as therapy feels this patient would require 2-3 visits per week. OP or HH would be the best option depending on patient's home bound status. Consider, if the patient has other  \"skilled\" needs such as wounds, IV antibiotics, etc. " "Combined with \"low intensity\" could also equate to a SNF. If patient is medically complex, consider LTAC.. Pt requires no DME at discharge.     Pt desires Home with family assist at discharge. Pt cooperative; agreeable to therapeutic recommendations and plan of care.     Modified [place]: N/A = No pre-op stroke/TIA    Post-Tx Position: Supine with HOB Elevated  PPE: gloves    "

## 2023-08-14 NOTE — PAYOR COMM NOTE
"This is clinical update for Sara Berger  Reference/Auth#: [id number]     EXTENDED AUTHORIZATION PENDING:     Please call or fax determination to contact below.   Thank you.    Yady Riggins, RN, BSN  Utilization Review Nurse  [facility]  Direct & confidential phone # [phone]  Fax # [phone]    Sara Berger (62 y.o. Female)       Date of Birth   1960    Social Security Number       Address   [address] [place] [address] [place] [facility]    Home Phone   [phone]    MRN   [id number]       [facility]   None    Marital Status                               Admission Date   8/9/23    Admission Type   Elective    Admitting Provider   Noe Clements MD    Attending Provider   Noe Clements MD    Department, Room/Bed   [facility] CARDIOV[facility]ULAR CARE UNIT, 2202/1       Discharge Date       Discharge Disposition       Discharge Destination                                 Attending Provider: Noe Clements MD    Allergies: No Known Allergies    Isolation: None   Infection: None   Code Status: CPR    Ht: 157.5 cm (62\")   Wt: 85.1 kg (187 lb 9.6 oz)    Admission Cmt: None   Principal Problem: Coronary artery disease involving native coronary artery of native heart with unstable angina pectoris [I25.110]                   Active Insurance as of 8/9/2023       Primary Coverage       Payor Plan Insurance Group Employer/Plan Group    [employer]CIAL AETNA [id number]       Payor Plan Address Payor Plan Phone Number Payor Plan Fax Number Effective Dates    [address] [phone]  4/1/2018 - None Entered    [facility] [id number]         Subscriber Name Subscriber Birth Date Member ID       KATE BERGER [birth year]10/1960 [id number]                     Emergency Contacts        (Rel.) Home Phone Work Phone Mobile Phone    CELINEKATE (Spouse) -- -- [phone]                 Operative/Procedure Notes (last 7 days)    "     Noe Clements MD at 08/11/23 0752          Operative Note    Date of Dictation: 08/11/23    Date of Procedure: 08/11/23    Referring Physician: Arden Salcido MD    Preoperative diagnosis: Multivessel CAD    Postoperative diagnosis: Same    Procedure:   1. CABG x 4 (LIMA to LAD, SVG to Diagonal, SVG to OM, SVG to RCA)  2. EVH of the right legs    Surgeon: Noe Clements MD     Assistants: JONAH Juarez was responsible for performing the following activities: Cardiac Surgery First assist, Endoscopic Vein Spring for CABG, surgical wound closure and their skilled assistance was necessary for the success of this case.     Anesthesia: General endotracheal anesthesia and MARY    Findings:  The saphenous vein was harvested endoscopically form the right  leg. The vein had a diameter of 4 mm and was of good quality. The coronaries had a diameter of 2 mm and were of good quality.      Estimated Blood Loss:  350 mL of Cell Saver returned.    STS Data: The STS Risk score discussed with the patient and family.  Counseling was done regarding abuse of tobacco, alcohol and drugs as needed. They understand and wish to proceed. The antibiotics and b blockers were given in the STS required window.          Description of the procedure:     The patient was placed supine on the operative table. General anesthesia was given and lines placed. The patient was prepped and draped using the usual sterile technique. A median sternotomy was performed with a scalpel and the layers carried down to the sternum using the electrocautery. The sternum was split in the midline using a vertical oscillating saw. Hemostasis was achieved. The LIMA was harvested skeletonized and prepared with papaverine. It was of good quality. 300 units/kg of IV heparin was given to an ACT over 400. A Favaloro retractor was placed and the mediastinum exposed, the pericardium was opened and the edges tacked to the wound. Cannulation sutures  were placed in the ascending aorta and right atrium. Small cannulas were placed and aorto right atrial cardiopulmonary bypass was started. Cardioplegia cannulas were placed. Cardiopulmonary bypass was then established for 66 minutes drifting to 34øC at appropriate flow rates.  The aorta was crossclamped for 58 minutes and we gave 1000 cc of antegrade cold blood cardioplegia then 200L of retrograde cold blood cardioplegia and then repeated doses every 10 to 15 minutes to good effect. 3veins were anastomosed to the ascending aorta with 6-0 Prolene and marked with washers.  The first vein was sewn to the distal right coronary artery with 7-0 Prolene.  The next vein was sewn to first diagonal with 7-0 Prolene.  The third vein was sewn obtuse marginal one of the circunflex artery with 7-0 Prolene. The LIMA was sewn to the distal LAD with 7-0 Prolene. A warm dose of cardioplegia was given and then the aortic clamp removed as well as the LIMA bulldog clamp. All anastomoses were checked and had good flow and morphology. The left pleural space was suctioned and the lungs ventilated. The heart was paced till regular atrial rhythm resumed. I allowed the heart to eject and once hemodynamics were acceptable, then the CPB was discontinued and the venous and cardioplegia cannulas removed. The matching dose of protamine was given and the aortic cannula removed as well. AV temporary wires and pleural and mediastinal chest tubes were placed and the wound sprayed with platelet rich plasma. The sternum was closed with single and double wires and soft tissue in layers of reabsorbable material. The wounds were covered with sterile dressings.       Specimen removed:  none    CPB time:  66 minutes.    Aortic clamp time:  58 minutes    Complications:  none           Disposition: Cardiovascular recovery room           Condition: Critical but stable.     Electronically signed by Noe Clements MD at 08/11/23 1691          Physician  "Progress Notes (last 48 hours)        Noe Clements MD at 08/13/23 0917             LOS: 4 days   Patient Care Team:  Provider, No Known as PCP - Jacob Webber MD as Consulting Physician (Cardiology)    Chief Complaint:       Subjective      Vital Signs  Temp:  [98.5 øF (36.9 øC)-99.3 øF (37.4 øC)] 98.5 øF (36.9 øC)  Heart Rate:  [75-87] 75  Resp:  [15-20] 17  BP: ()/(54-63) 105/56  Body mass index is 35.12 kg/mý.    Intake/Output Summary (Last 24 hours) at 8/13/2023 0918  Last data filed at 8/13/2023 0600  Gross per 24 hour   Intake 1139 ml   Output 1850 ml   Net -711 ml     No intake/output data recorded.      Wt Readings from Last 3 Encounters:   08/13/23 87.1 kg (192 lb 0.3 oz)         Objective:  Vital signs: (most recent): Blood pressure 105/56, pulse 75, temperature 98.5 øF (36.9 øC), temperature source Oral, resp. rate 17, height 157.5 cm (62\"), weight 87.1 kg (192 lb 0.3 oz), SpO2 93 %.              Results Review:        WBC No results found for: WBCS   HGB Hemoglobin   Date Value Ref Range Status   08/13/2023 8.5 (L) 12.0 - 15.9 g/dL Final   08/13/2023 8.8 (L) 12.0 - 15.9 g/dL Final   08/12/2023 9.8 (L) 12.0 - 15.9 g/dL Final   08/11/2023 10.6 (L) 12.0 - 17.0 g/dL Final   08/11/2023 11.0 (L) 12.0 - 17.0 g/dL Final   08/11/2023 10.7 (L) 12.0 - 15.9 g/dL Final   08/11/2023 11.0 (L) 12.0 - 17.0 g/dL Final   08/11/2023 10.5 (L) 12.0 - 17.0 g/dL Final   08/11/2023 11.5 (L) 12.0 - 17.0 g/dL Final   08/11/2023 12.3 12.0 - 17.0 g/dL Final   08/11/2023 12.5 12.0 - 15.9 g/dL Final   08/11/2023 10.2 (L) 12.0 - 17.0 g/dL Final   08/11/2023 9.9 (L) 12.0 - 17.0 g/dL Final   08/11/2023 9.5 (L) 12.0 - 17.0 g/dL Final   08/11/2023 10.5 (L) 12.0 - 17.0 g/dL Final   08/11/2023 9.2 (L) 12.0 - 17.0 g/dL Final   08/11/2023 12.9 12.0 - 17.0 g/dL Final   08/11/2023 14.1 12.0 - 15.9 g/dL Final      HCT Hematocrit   Date Value Ref Range Status   08/13/2023 25.7 (L) 34.0 - 46.6 % Final "   08/13/2023 26.5 (L) 34.0 - 46.6 % Final   08/12/2023 29.0 (L) 34.0 - 46.6 % Final   08/11/2023 31 (L) 38 - 51 % Final   08/11/2023 32 (L) 38 - 51 % Final   08/11/2023 32.0 (L) 34.0 - 46.6 % Final   08/11/2023 32 (L) 38 - 51 % Final   08/11/2023 31 (L) 38 - 51 % Final   08/11/2023 34 (L) 38 - 51 % Final   08/11/2023 36 (L) 38 - 51 % Final   08/11/2023 37.7 34.0 - 46.6 % Final   08/11/2023 30 (L) 38 - 51 % Final   08/11/2023 29 (L) 38 - 51 % Final   08/11/2023 28 (L) 38 - 51 % Final   08/11/2023 31 (L) 38 - 51 % Final   08/11/2023 27 (L) 38 - 51 % Final   08/11/2023 38 38 - 51 % Final   08/11/2023 41.3 34.0 - 46.6 % Final      Platelets No results found for: LABPLAT     PT/INR:    Protime   Date Value Ref Range Status   08/12/2023 10.8 9.6 - 11.7 Seconds Final   08/11/2023 11.6 9.6 - 11.7 Seconds Final   08/11/2023 10.5 9.6 - 11.7 Seconds Final   /  INR   Date Value Ref Range Status   08/12/2023 1.01 0.93 - 1.10 Final   08/11/2023 1.09 0.93 - 1.10 Final   08/11/2023 0.98 0.93 - 1.10 Final       Sodium Sodium   Date Value Ref Range Status   08/13/2023 136 136 - 145 mmol/L Final   08/12/2023 144 136 - 145 mmol/L Final   08/11/2023 146 (H) 136 - 145 mmol/L Final   08/11/2023 141 136 - 145 mmol/L Final   08/11/2023 141 136 - 145 mmol/L Final      Potassium Potassium   Date Value Ref Range Status   08/13/2023 3.4 (L) 3.5 - 5.2 mmol/L Final   08/12/2023 4.3 3.5 - 5.2 mmol/L Final   08/11/2023 4.0 3.5 - 5.2 mmol/L Final   08/11/2023 4.2 3.5 - 5.2 mmol/L Final     Comment:     Slight hemolysis detected by analyzer. Results may be affected.   08/11/2023 3.9 3.5 - 5.2 mmol/L Final      Chloride Chloride   Date Value Ref Range Status   08/13/2023 102 98 - 107 mmol/L Final   08/12/2023 111 (H) 98 - 107 mmol/L Final   08/11/2023 112 (H) 98 - 107 mmol/L Final   08/11/2023 110 (H) 98 - 107 mmol/L Final   08/11/2023 106 98 - 107 mmol/L Final      Bicarbonate No results found for: PLASMABICARB   BUN BUN   Date Value Ref Range Status    08/13/2023 17 8 - 23 mg/dL Final   08/12/2023 12 8 - 23 mg/dL Final   08/11/2023 11 8 - 23 mg/dL Final   08/11/2023 12 8 - 23 mg/dL Final   08/11/2023 15 8 - 23 mg/dL Final      Creatinine Creatinine   Date Value Ref Range Status   08/13/2023 0.73 0.57 - 1.00 mg/dL Final   08/12/2023 0.60 0.57 - 1.00 mg/dL Final   08/11/2023 0.59 0.57 - 1.00 mg/dL Final   08/11/2023 0.65 0.57 - 1.00 mg/dL Final   08/11/2023 0.67 0.57 - 1.00 mg/dL Final      Calcium Calcium   Date Value Ref Range Status   08/13/2023 8.7 8.6 - 10.5 mg/dL Final   08/12/2023 7.8 (L) 8.6 - 10.5 mg/dL Final   08/11/2023 8.0 (L) 8.6 - 10.5 mg/dL Final   08/11/2023 8.4 (L) 8.6 - 10.5 mg/dL Final   08/11/2023 9.4 8.6 - 10.5 mg/dL Final      Magnesium Magnesium   Date Value Ref Range Status   08/12/2023 2.7 (H) 1.6 - 2.4 mg/dL Final   08/11/2023 2.7 (H) 1.6 - 2.4 mg/dL Final   08/11/2023 2.8 (H) 1.6 - 2.4 mg/dL Final         .imag    aspirin, 81 mg, Oral, Daily  atorvastatin, 40 mg, Oral, Nightly  bumetanide, 2 mg, Intravenous, TID  chlorhexidine, 15 mL, Mouth/Throat, Q12H  cyclobenzaprine, 5 mg, Oral, Once  enoxaparin, 40 mg, Subcutaneous, Daily  insulin lispro, 2-7 Units, Subcutaneous, 4x Daily AC & at Bedtime  ketorolac, 15 mg, Intravenous, Q8H  mupirocin, , Each Nare, BID  pantoprazole, 40 mg, Oral, Daily  PARoxetine, 20 mg, Oral, Daily  potassium chloride ER, 40 mEq, Oral, Q4H  potassium chloride, 10 mEq, Intravenous, Once  senna-docusate sodium, 2 tablet, Oral, Nightly               Coronary artery disease involving native coronary artery of native heart with unstable angina pectoris      Assessment & Plan    POD #2 CABG. Doing well. Some pain, add flexeril. CT / Central line out. DC wires tomorrow. Replace potasium. Labs in the afternoon.      Noe Clements MD  08/13/23  09:18 EDT             Electronically signed by Noe Clements MD at 08/13/23 0919       Noe Clements MD at 08/12/23 0939           LOS: 3 days   Patient  "Care Team:  Provider, No Known as PCP - Jacob Webber MD as Consulting Physician (Cardiology)    Chief Complaint:       Subjective      Vital Signs  Temp:  [96.3 øF (35.7 øC)-98.6 øF (37 øC)] 97.8 øF (36.6 øC)  Heart Rate:  [73-97] 89  Resp:  [12-21] 18  BP: ()/(43-95) 100/70  FiO2 (%):  [40 %-70 %] 40 %  Body mass index is 35.12 kg/mý.    Intake/Output Summary (Last 24 hours) at 8/12/2023 0939  Last data filed at 8/12/2023 0838  Gross per 24 hour   Intake 5595.13 ml   Output 4630 ml   Net 965.13 ml     I/O this shift:  In: 1659 [P.O.:600; I.V.:1059]  Out: -       Wt Readings from Last 3 Encounters:   08/12/23 87.1 kg (192 lb 0.3 oz)         Objective:  Vital signs: (most recent): Blood pressure 100/70, pulse 89, temperature 97.8 øF (36.6 øC), temperature source Oral, resp. rate 18, height 157.5 cm (62\"), weight 87.1 kg (192 lb 0.3 oz), SpO2 93 %.              Results Review:        WBC No results found for: WBCS   HGB Hemoglobin   Date Value Ref Range Status   08/12/2023 9.8 (L) 12.0 - 15.9 g/dL Final   08/11/2023 10.6 (L) 12.0 - 17.0 g/dL Final   08/11/2023 11.0 (L) 12.0 - 17.0 g/dL Final   08/11/2023 10.7 (L) 12.0 - 15.9 g/dL Final   08/11/2023 11.0 (L) 12.0 - 17.0 g/dL Final   08/11/2023 10.5 (L) 12.0 - 17.0 g/dL Final   08/11/2023 11.5 (L) 12.0 - 17.0 g/dL Final   08/11/2023 12.3 12.0 - 17.0 g/dL Final   08/11/2023 12.5 12.0 - 15.9 g/dL Final   08/11/2023 10.2 (L) 12.0 - 17.0 g/dL Final   08/11/2023 9.9 (L) 12.0 - 17.0 g/dL Final   08/11/2023 9.5 (L) 12.0 - 17.0 g/dL Final   08/11/2023 10.5 (L) 12.0 - 17.0 g/dL Final   08/11/2023 9.2 (L) 12.0 - 17.0 g/dL Final   08/11/2023 12.9 12.0 - 17.0 g/dL Final   08/11/2023 14.1 12.0 - 15.9 g/dL Final   08/09/2023 14.6 12.0 - 15.9 g/dL Final      HCT Hematocrit   Date Value Ref Range Status   08/12/2023 29.0 (L) 34.0 - 46.6 % Final   08/11/2023 31 (L) 38 - 51 % Final   08/11/2023 32 (L) 38 - 51 % Final   08/11/2023 32.0 (L) 34.0 - 46.6 % Final "   08/11/2023 32 (L) 38 - 51 % Final   08/11/2023 31 (L) 38 - 51 % Final   08/11/2023 34 (L) 38 - 51 % Final   08/11/2023 36 (L) 38 - 51 % Final   08/11/2023 37.7 34.0 - 46.6 % Final   08/11/2023 30 (L) 38 - 51 % Final   08/11/2023 29 (L) 38 - 51 % Final   08/11/2023 28 (L) 38 - 51 % Final   08/11/2023 31 (L) 38 - 51 % Final   08/11/2023 27 (L) 38 - 51 % Final   08/11/2023 38 38 - 51 % Final   08/11/2023 41.3 34.0 - 46.6 % Final   08/09/2023 43.4 34.0 - 46.6 % Final      Platelets No results found for: LABPLAT     PT/INR:    Protime   Date Value Ref Range Status   08/12/2023 10.8 9.6 - 11.7 Seconds Final   08/11/2023 11.6 9.6 - 11.7 Seconds Final   08/11/2023 10.5 9.6 - 11.7 Seconds Final   08/09/2023 10.1 9.6 - 11.7 Seconds Final   /  INR   Date Value Ref Range Status   08/12/2023 1.01 0.93 - 1.10 Final   08/11/2023 1.09 0.93 - 1.10 Final   08/11/2023 0.98 0.93 - 1.10 Final   08/09/2023 0.94 0.93 - 1.10 Final       Sodium Sodium   Date Value Ref Range Status   08/12/2023 144 136 - 145 mmol/L Final   08/11/2023 146 (H) 136 - 145 mmol/L Final   08/11/2023 141 136 - 145 mmol/L Final   08/11/2023 141 136 - 145 mmol/L Final   08/09/2023 141 136 - 145 mmol/L Final      Potassium Potassium   Date Value Ref Range Status   08/12/2023 4.3 3.5 - 5.2 mmol/L Final   08/11/2023 4.0 3.5 - 5.2 mmol/L Final   08/11/2023 4.2 3.5 - 5.2 mmol/L Final     Comment:     Slight hemolysis detected by analyzer. Results may be affected.   08/11/2023 3.9 3.5 - 5.2 mmol/L Final   08/09/2023 3.8 3.5 - 5.2 mmol/L Final      Chloride Chloride   Date Value Ref Range Status   08/12/2023 111 (H) 98 - 107 mmol/L Final   08/11/2023 112 (H) 98 - 107 mmol/L Final   08/11/2023 110 (H) 98 - 107 mmol/L Final   08/11/2023 106 98 - 107 mmol/L Final   08/09/2023 106 98 - 107 mmol/L Final      Bicarbonate No results found for: PLASMABICARB   BUN BUN   Date Value Ref Range Status   08/12/2023 12 8 - 23 mg/dL Final   08/11/2023 11 8 - 23 mg/dL Final   08/11/2023  12 8 - 23 mg/dL Final   08/11/2023 15 8 - 23 mg/dL Final   08/09/2023 15 8 - 23 mg/dL Final      Creatinine Creatinine   Date Value Ref Range Status   08/12/2023 0.60 0.57 - 1.00 mg/dL Final   08/11/2023 0.59 0.57 - 1.00 mg/dL Final   08/11/2023 0.65 0.57 - 1.00 mg/dL Final   08/11/2023 0.67 0.57 - 1.00 mg/dL Final   08/09/2023 0.65 0.57 - 1.00 mg/dL Final      Calcium Calcium   Date Value Ref Range Status   08/12/2023 7.8 (L) 8.6 - 10.5 mg/dL Final   08/11/2023 8.0 (L) 8.6 - 10.5 mg/dL Final   08/11/2023 8.4 (L) 8.6 - 10.5 mg/dL Final   08/11/2023 9.4 8.6 - 10.5 mg/dL Final   08/09/2023 9.4 8.6 - 10.5 mg/dL Final      Magnesium Magnesium   Date Value Ref Range Status   08/12/2023 2.7 (H) 1.6 - 2.4 mg/dL Final   08/11/2023 2.7 (H) 1.6 - 2.4 mg/dL Final   08/11/2023 2.8 (H) 1.6 - 2.4 mg/dL Final         .imag    aspirin, 81 mg, Oral, Daily  atorvastatin, 40 mg, Oral, Nightly  ceFAZolin, 2 g, Intravenous, Q8H  chlorhexidine, 15 mL, Mouth/Throat, Q12H  enoxaparin, 40 mg, Subcutaneous, Daily  mupirocin, , Each Nare, BID  pantoprazole, 40 mg, Oral, Daily  PARoxetine, 20 mg, Oral, Daily  senna-docusate sodium, 2 tablet, Oral, Nightly      dexmedetomidine, 0.2-1.5 mcg/kg/hr, Last Rate: Stopped (08/11/23 1500)  EPINEPHrine, 0.02-0.3 mcg/kg/min  insulin, 0-100 Units/hr  niCARdipine, 5-15 mg/hr  nitroglycerin, 10-50 mcg/min  norepinephrine, 0.02-0.3 mcg/kg/min, Last Rate: 0.02 mcg/kg/min (08/12/23 0230)  sodium chloride, 30 mL/hr, Last Rate: 30 mL/hr (08/11/23 1255)            Coronary artery disease involving native coronary artery of native heart with unstable angina pectoris      Assessment & Plan    POD #1 CABG. Doing well. Give lasix , Creatinine normal. DC chest tubes after ambulation. [place]. Routine care      Noe Clements MD  08/12/23  09:39 EDT             Electronically signed by Noe Clements MD at 08/12/23 0941       Consult Notes (last 48 hours)  Notes from 08/12/23 0855 through 08/14/23  0855   No notes of this type exist for this encounter.

## 2023-08-15 ENCOUNTER — READMISSION MANAGEMENT (OUTPATIENT)
Dept: CALL CENTER | Facility: HOSPITAL | Age: 63
End: 2023-08-15
Payer: COMMERCIAL

## 2023-08-15 VITALS
SYSTOLIC BLOOD PRESSURE: 115 MMHG | OXYGEN SATURATION: 93 % | HEIGHT: 62 IN | RESPIRATION RATE: 20 BRPM | HEART RATE: 88 BPM | TEMPERATURE: 98.9 F | WEIGHT: 188.49 LBS | BODY MASS INDEX: 34.69 KG/M2 | DIASTOLIC BLOOD PRESSURE: 69 MMHG

## 2023-08-15 LAB
DEPRECATED RDW RBC AUTO: 43.3 FL (ref 37–54)
ERYTHROCYTE [DISTWIDTH] IN BLOOD BY AUTOMATED COUNT: 13.5 % (ref 12.3–15.4)
GLUCOSE BLDC GLUCOMTR-MCNC: 108 MG/DL (ref 70–105)
HCT VFR BLD AUTO: 26.9 % (ref 34–46.6)
HGB BLD-MCNC: 9.1 G/DL (ref 12–15.9)
MAGNESIUM SERPL-MCNC: 2.4 MG/DL (ref 1.6–2.4)
MCH RBC QN AUTO: 30.8 PG (ref 26.6–33)
MCHC RBC AUTO-ENTMCNC: 33.7 G/DL (ref 31.5–35.7)
MCV RBC AUTO: 91.5 FL (ref 79–97)
PLATELET # BLD AUTO: 197 10*3/MM3 (ref 140–450)
PMV BLD AUTO: 8.3 FL (ref 6–12)
POTASSIUM SERPL-SCNC: 4 MMOL/L (ref 3.5–5.2)
RBC # BLD AUTO: 2.94 10*6/MM3 (ref 3.77–5.28)
WBC NRBC COR # BLD: 9.3 10*3/MM3 (ref 3.4–10.8)

## 2023-08-15 PROCEDURE — 82948 REAGENT STRIP/BLOOD GLUCOSE: CPT

## 2023-08-15 PROCEDURE — 94664 DEMO&/EVAL PT USE INHALER: CPT

## 2023-08-15 PROCEDURE — 94799 UNLISTED PULMONARY SVC/PX: CPT

## 2023-08-15 PROCEDURE — 83735 ASSAY OF MAGNESIUM: CPT

## 2023-08-15 PROCEDURE — 84132 ASSAY OF SERUM POTASSIUM: CPT

## 2023-08-15 PROCEDURE — 85027 COMPLETE CBC AUTOMATED: CPT

## 2023-08-15 PROCEDURE — 25010000002 MAGNESIUM SULFATE 2 GM/50ML SOLUTION

## 2023-08-15 PROCEDURE — 97535 SELF CARE MNGMENT TRAINING: CPT

## 2023-08-15 PROCEDURE — 97530 THERAPEUTIC ACTIVITIES: CPT

## 2023-08-15 PROCEDURE — 97110 THERAPEUTIC EXERCISES: CPT

## 2023-08-15 PROCEDURE — 97116 GAIT TRAINING THERAPY: CPT

## 2023-08-15 RX ORDER — BUMETANIDE 2 MG/1
2 TABLET ORAL DAILY
Qty: 30 TABLET | Refills: 0 | Status: SHIPPED | OUTPATIENT
Start: 2023-08-15

## 2023-08-15 RX ORDER — BUMETANIDE 2 MG/1
2 TABLET ORAL DAILY
Qty: 30 TABLET | Refills: 0 | Status: SHIPPED | OUTPATIENT
Start: 2023-08-15 | End: 2023-08-15 | Stop reason: SDUPTHER

## 2023-08-15 RX ORDER — HYDROCODONE BITARTRATE AND ACETAMINOPHEN 10; 325 MG/1; MG/1
1 TABLET ORAL EVERY 4 HOURS PRN
Qty: 28 TABLET | Refills: 0 | Status: SHIPPED | OUTPATIENT
Start: 2023-08-15

## 2023-08-15 RX ORDER — BUMETANIDE 1 MG/1
2 TABLET ORAL DAILY
Status: DISCONTINUED | OUTPATIENT
Start: 2023-08-15 | End: 2023-08-15 | Stop reason: HOSPADM

## 2023-08-15 RX ORDER — CLOPIDOGREL BISULFATE 75 MG/1
75 TABLET ORAL DAILY
Qty: 30 TABLET | Refills: 2 | Status: SHIPPED | OUTPATIENT
Start: 2023-08-15

## 2023-08-15 RX ORDER — POTASSIUM CHLORIDE 20 MEQ/1
20 TABLET, EXTENDED RELEASE ORAL DAILY
Qty: 30 TABLET | Refills: 0 | Status: SHIPPED | OUTPATIENT
Start: 2023-08-15

## 2023-08-15 RX ORDER — GUAIFENESIN 600 MG/1
600 TABLET, EXTENDED RELEASE ORAL EVERY 12 HOURS SCHEDULED
Qty: 28 TABLET | Refills: 0 | Status: SHIPPED | OUTPATIENT
Start: 2023-08-15 | End: 2023-08-29

## 2023-08-15 RX ORDER — GUAIFENESIN 600 MG/1
600 TABLET, EXTENDED RELEASE ORAL EVERY 12 HOURS SCHEDULED
Qty: 28 TABLET | Refills: 0 | Status: SHIPPED | OUTPATIENT
Start: 2023-08-15 | End: 2023-08-15 | Stop reason: SDUPTHER

## 2023-08-15 RX ORDER — POTASSIUM CHLORIDE 20 MEQ/1
20 TABLET, EXTENDED RELEASE ORAL DAILY
Status: DISCONTINUED | OUTPATIENT
Start: 2023-08-15 | End: 2023-08-15 | Stop reason: HOSPADM

## 2023-08-15 RX ORDER — POTASSIUM CHLORIDE 20 MEQ/1
20 TABLET, EXTENDED RELEASE ORAL DAILY
Qty: 30 TABLET | Refills: 0 | Status: SHIPPED | OUTPATIENT
Start: 2023-08-15 | End: 2023-08-15 | Stop reason: SDUPTHER

## 2023-08-15 RX ADMIN — METOPROLOL TARTRATE 6.25 MG: 25 TABLET, FILM COATED ORAL at 11:20

## 2023-08-15 RX ADMIN — POTASSIUM CHLORIDE 20 MEQ: 1500 TABLET, EXTENDED RELEASE ORAL at 11:20

## 2023-08-15 RX ADMIN — PAROXETINE HYDROCHLORIDE 20 MG: 20 TABLET, FILM COATED ORAL at 11:20

## 2023-08-15 RX ADMIN — ASPIRIN 81 MG: 81 TABLET, COATED ORAL at 11:20

## 2023-08-15 RX ADMIN — PANTOPRAZOLE SODIUM 40 MG: 40 TABLET, DELAYED RELEASE ORAL at 11:20

## 2023-08-15 RX ADMIN — MAGNESIUM SULFATE HEPTAHYDRATE 2 G: 40 INJECTION, SOLUTION INTRAVENOUS at 04:12

## 2023-08-15 RX ADMIN — HYDROCODONE BITARTRATE AND ACETAMINOPHEN 1 TABLET: 10; 325 TABLET ORAL at 04:12

## 2023-08-15 RX ADMIN — BUMETANIDE 2 MG: 1 TABLET ORAL at 11:20

## 2023-08-15 RX ADMIN — MUPIROCIN: 20 OINTMENT TOPICAL at 11:20

## 2023-08-15 RX ADMIN — GUAIFENESIN 600 MG: 600 TABLET, EXTENDED RELEASE ORAL at 11:20

## 2023-08-15 RX ADMIN — IPRATROPIUM BROMIDE AND ALBUTEROL SULFATE 3 ML: .5; 3 SOLUTION RESPIRATORY (INHALATION) at 06:10

## 2023-08-15 RX ADMIN — HYDROCODONE BITARTRATE AND ACETAMINOPHEN 1 TABLET: 10; 325 TABLET ORAL at 00:27

## 2023-08-15 NOTE — PAYOR COMM NOTE
"Discharge notification for case# [id number]     Clinical update was faxed on 8/14/23  ---------  AUTHORIZATION PENDING FOR DATES 8/11-8/15/23:   PLEASE FAX OR CALL DETERMINATION TO CONTACT BELOW:       THANK YOU,    BRADY QuintanaN, RN  Utilization Review  [facility]  Phone: [phone]  Fax: [phone]      NPI: [id number]  Tax ID: [id number]        Sara Chaves (62 y.o. Female)       Date of Birth   1960    Social Security Number       Address   [address] [place] [address]    Home Phone   [phone]    MRN   [id number]       D[place]   None    Marital Status                               Admission Date   8/9/23    Admission Type   Elective    Admitting Provider   Noe Clements MD    Attending Provider       Department, Room/Bed   [facility] CARDIOVASCULAR CARE UNIT, 2202/1       Discharge Date   8/15/2023    Discharge Disposition   Home or Self Care    Discharge Destination                                 Attending Provider: (none)   Allergies: No Known Allergies    Isolation: None   Infection: None   Code Status: Prior    Ht: 157.5 cm (62\")   Wt: 85.5 kg (188 lb 7.9 oz)    Admission Cmt: None   Principal Problem: Coronary artery disease involving native coronary artery of native heart with unstable angina pectoris [I25.110]                   Active Insurance as of 8/9/2023       Primary Coverage       Payor Plan Insurance Group Employer/Plan Group    AETNA COMMERCIAL AETNA [id number]       Payor Plan Address Payor Plan Phone Number Payor Plan Fax Number Effective Dates    [address] [phone]  4/1/2018 - None Entered    [facility] [id number]         Subscriber Name Subscriber Birth Date Member ID       KATE CHAVES [birth year]10/1960 [id number]                     Emergency Contacts        (Rel.) Home Phone Work Phone Mobile Phone    KATE CHAVES (Spouse) -- -- [phone]                 Discharge Summary        Zayda De Santiago" NISHA CHONGGILMAR at 08/15/23 0834          Date of Admission: 8/9/23  Date of Discharge:  8/15/2023    Discharge Diagnosis:   -NSTEMI with severe MV CAD, s/p CABG x 4 (LIMA to LAD, SVG to Diagonal, SVG to OM, SVG to RCA)  -HTN  -HLD  -Tobacco abuse--encouraged cessation    Presenting Problem/History of Present Illness  CAD (coronary artery disease) [I25.10]     Hospital Course  Patient is a 62 y.o. female presented [facility] with chest discomfort progressively worsening over the past several weeks. Further history includes HTN, HLD, and tobacco abuse. She denies other medical problems. She had a severe episode of chest pain with associated diaphoresis, nausea, and vomiting which brought her to the emergency department.  Today she underwent LHC and was found to have severe three-vessel CAD with moderate LAD stenosis with IFR of the LAD of 0.87, D1 with 90% stenosis, LCX with 100% occluded in the mid segment with left to left collaterals filling the OMs, 70% mRCA and 99% dRCA stenosis. She had an echocardiogram which showed preserved LV function with an LVEF of 60% with grade 1 diastolic dysfunction and no evidence of valvular dysfunction. She was referred to [facility] by Dr. Salcido and brought to [facility] for possible surgical revascularization.  She was seen and evaluated by Dr. Clements who recommended CABG.  On 8/11 she underwent CABG x4 with LIMA to the LAD, SVG to diagonal, SVG to OM, and SVG to RCA with Dr. Clements.  Postoperatively she did well.  She was extubated day of surgery and weaned from vasopressors and inotropes.  Wires and tubes were removed in the usual fashion.  She was IV diuresed.  She had an overnight oximetry study on postop day 3 that showed the need for nocturnal home oxygen.  On the morning of postop day 4 she was deemed stable for discharge home.  She is tolerating the current medication regimen.  Plavix was added on day of discharge for NSTEMI presentation.  She is eating and drinking  appropriately as well as urinating and defecating adequately.  Sternal precautions reviewed as well as signs and symptoms of sternal wound infection.  Questions answered with verbalized understanding.  She is to follow-up with cardiology as scheduled and in our office on 8/31 at 9:15 AM.    Procedures Performed  Procedure(s):  CORONARY ARTERY BYPASS GRAFTING       Consults:   Consults       No orders found from 7/11/2023 to 8/10/2023.            Pertinent Test Results:    Lab Results   Component Value Date    WBC 9.30 08/15/2023    HGB 9.1 (L) 08/15/2023    HCT 26.9 (L) 08/15/2023    MCV 91.5 08/15/2023     08/15/2023      Lab Results   Component Value Date    GLUCOSE 107 (H) 08/14/2023    CALCIUM 9.2 08/14/2023     08/14/2023    K 4.0 08/15/2023    CO2 28.0 08/14/2023     08/14/2023    BUN 20 08/14/2023    CREATININE 0.69 08/14/2023    BCR 29.0 (H) 08/14/2023    ANIONGAP 9.0 08/14/2023     Lab Results   Component Value Date    INR 1.01 08/12/2023    PROTIME 10.8 08/12/2023     Condition on Discharge: Stable    Vital Signs  Temp:  [98.4 øF (36.9 øC)-99.7 øF (37.6 øC)] 98.9 øF (37.2 øC)  Heart Rate:  [71-95] 78  Resp:  [14-26] 20  BP: ()/(49-84) 115/69      Discharge Disposition  Home or Self Care    Discharge Medications     Discharge Medications        New Medications        Instructions Start Date   bumetanide 2 MG tablet  Commonly known as: BUMEX   2 mg, Oral, Daily      clopidogrel 75 MG tablet  Commonly known as: PLAVIX   75 mg, Oral, Daily      guaiFENesin 600 MG 12 hr tablet  Commonly known as: MUCINEX   600 mg, Oral, Every 12 Hours Scheduled      HYDROcodone-acetaminophen  MG per tablet  Commonly known as: NORCO   1 tablet, Oral, Every 4 Hours PRN      metoprolol tartrate 25 MG tablet  Commonly known as: LOPRESSOR   6.25 mg, Oral, Every 12 Hours Scheduled      potassium chloride 20 MEQ CR tablet  Commonly known as: K-DUR,KLOR-CON   20 mEq, Oral, Daily             Continue These  Medications        Instructions Start Date   ASPIRIN 81 PO   81 mg, Oral, Daily      fish oil 1000 MG capsule capsule   1,000 mg, Oral, Nightly      PARoxetine 20 MG tablet  Commonly known as: PAXIL   1 tablet, Oral, Daily      pravastatin 80 MG tablet  Commonly known as: PRAVACHOL   TAKE ONE TABLET BY MOUTH EVERY DAY             Stop These Medications      lisinopril-hydrochlorothiazide 20-12.5 MG per tablet  Commonly known as: PRINZIDE,ZESTORETIC              Discharge Diet:     Activity at Discharge:   1. No driving for 2 weeks and off narcotic pain medications.  2. Shower daily. Clean incisions with warm water and antibacterial soap only. Do not put any lotion or ointments on incisions.  3. Ambulate for 10 minutes at least 3 times a day.  4. No heavy lifting > 10lbs until seen in office.   5. Take all medications as prescribed.     Follow-up Appointments  Future Appointments   Date Time Provider Department Center   8/31/2023  9:15 AM Ryann Meyer APRN [facility]   11/14/2023  3:00 PM Royal Gonzales MD [facility]     Additional Instructions for the Follow-ups that You Need to Schedule       Ambulatory Referral to Cardiac Rehab   As directed      Call MD With Problems / Concerns   As directed      Instructions:  Call office at [phone] for any drainage, increased redness, or fever over 100.5    Order Comments: Instructions:  Call office at [phone] for any drainage, increased redness, or fever over 100.5         Discharge Follow-up with PCP   As directed       Currently Documented PCP:    Provider, No Known    PCP Phone Number:    None     Follow Up Details: in 1 week        Discharge Follow-up with Specialty: Cardiologist APRN/PA; 1 Week   As directed      Specialty: Cardiologist APRN/PA   Follow Up: 1 Week   Follow Up Details: bring all prescription bottles to appointment, call for appointment        Discharge Follow-up with Specified Provider: Cardiologist; 1 Month    As directed      To: Cardiologist   Follow Up: 1 Month   Follow Up Details: call for appointment, bring all medication bottles to appointment        Discharge Follow-up with Specified Provider:    As directed      To:    Follow Up Details: 4-6 weeks, bring all current medications to appointment                Test Results Pending at Discharge    STS; patient discharged home on aspirin, statin, and Lopressor       CELY Herrera  08/15/23  10:22 EDT          Electronically signed by Zayda De Santiago APRN at 08/15/23 1022       Discharge Order (From admission, onward)       Start     Ordered    08/15/23 1013  Discharge patient  Once        Expected Discharge Date: 08/15/23   Expected Discharge Time: Midday   Discharge Disposition: Home or Self Care   Physician of Record for Attribution - Please select from Treatment Team: MART SR [[id number]]   Review needed by CMO to determine Physician of Record: No      Question Answer Comment   Physician of Record for Attribution - Please select from Treatment Team MRAT SR    Review needed by CMO to determine Physician of Record No        08/15/23 1015

## 2023-08-15 NOTE — OUTREACH NOTE
Prep Survey      Flowsheet Row Responses   [facility] facility patient discharged from? [facility]   Is LACE score < 7 ? No   Eligibility [facility]   [facility]   Date of Admission 08/09/23   Date of Discharge 08/15/23   Discharge Disposition Home or Self Care   Discharge diagnosis CABG x4 with unstable angina pectoris/NSTEMI with severe MV CAD   Does the patient have one of the following disease processes/diagnoses(primary or secondary)? Cardiothoracic surgery   Does the patient have Home health ordered? No   Is there a DME ordered? Yes   What DME was ordered? Home Oxygen--[facility] (nocturnal oxygen)   Comments regarding appointments NEW PCP APPT   Prep survey completed? Yes            Sarah STANTON - Registered Nurse

## 2023-08-15 NOTE — CASE MANAGEMENT/SOCIAL WORK
Case Management Discharge Note      Final Note: Routine home. Overnight oxygen thru [facility].           Transportation Services  Private: Car    Final Discharge Disposition Code: 01 - home or self-care

## 2023-08-15 NOTE — CASE MANAGEMENT/SOCIAL WORK
Continued Stay Note   [facility]     Patient Name: Sara Chaves  MRN: [id number]  Today's Date: 8/15/2023    Admit Date: 8/9/2023    Plan: DC PLAN: Routine home. (PCP appt. for August 22 at 10am)       Discharge Plan       Row Name 08/15/23 1151       Plan    Plan DC PLAN: Routine home. (PCP appt. for August 22 at 10am)    Plan Comments Called patient hub and set up PCP appointment in [place], [place] for August 22 at 10 am with Teetee Mckay. Bedside nurse and patient made aware. Patient needs to arrive by 9:30am. Plans to discharge today.    Notified by bedside nurse, patient will need over night oxgen has hard printout of results. DCP sent to [facility], Called [facility] in [place], spoke with Javier who requests results be faxed to [phone]. Faxed requested information, states will call patient to set up.  [facility], states will look in Epic and take care of overnight oxygen.                      Expected Discharge Date and Time       Expected Discharge Date Expected Discharge Time    Aug 15, 2023           Latha Krause RN

## 2023-08-15 NOTE — THERAPY TREATMENT NOTE
"Subjective: Pt agreeable to therapeutic plan of care.    Objective:     Bed mobility - up in bathroom with   Transfers - Modified-Independent  Ambulation - 400 feet Supervision  Stair training: pt able to ascend/descend 3 steps using light UE support on handrail and CGA.   educated on proper positioning to assist     Cardiac Level V    Vitals: WNL    Pain: 0 VAS       Education: Provided education on the importance of mobility in the acute care setting, Gait Training, and Post-Op Precautions    Assessment: Sara Chaves presents with functional mobility impairments which indicate the need for skilled intervention. Tolerating session today without incident. Stair training and gait training without AD completed this date.  Pt able to compete stair training with SBA-CGA and able to ambulate long distances without AD and SUP.  Pt has strong support of  who will be able to assist at home as needed.Will continue to follow and progress as tolerated.     Plan/Recommendations:   Low Intensity Therapy recommended post-acute care - This is recommended as therapy feels this patient would require 2-3 visits per week. OP or HH would be the best option depending on patient's home bound status. Consider, if the patient has other  \"skilled\" needs such as wounds, IV antibiotics, etc. Combined with \"low intensity\" could also equate to a SNF. If patient is medically complex, consider LTAC.. Pt requires no DME at discharge.     Pt desires Home with family assist at discharge. Pt cooperative; agreeable to therapeutic recommendations and plan of care.     Basic Mobility 6-click:  Rollin = Total, A lot = 2, A little = 3; 4 = None  Supine>Sit:   1 = Total, A lot = 2, A little = 3; 4 = None   Sit>Stand with arms:  1 = Total, A lot = 2, A little = 3; 4 = None  Bed>Chair:   1 = Total, A lot = 2, A little = 3; 4 = None  Ambulate in room:  1 = Total, A lot = 2, A little = 3; 4 = None  3-5 Steps with railin " = Total, A lot = 2, A little = 3; 4 = None  Score: 21    Modified [place]: N/A = No pre-op stroke/TIA    Post-Tx Position: Up in Chair and Call light and personal items within reach  PPE: gloves and surgical mask

## 2023-08-15 NOTE — THERAPY TREATMENT NOTE
"Subjective: Pt agreeable to therapeutic plan of care.  Cognition: oriented to Person, Place, Time, and Situation  Patient recalls 3/3 sternal precautions, requiring min verbal cuing for precautions.     Objective:     Bed Mobility: N/A or Not attempted.   Functional Transfers: CGA     Balance: sitting EOB SBA  Functional Ambulation: N/A or Not attempted.    Upper Body Dressing: SBA, cuing  ADL Position: supported sitting  ADL Comments: recliner    Lower Body Dressing: CGA  ADL Position: supported sitting  ADL Comments: dons slip on shoes, but has  assist with socks    Therapeutic Exercise - OT reviewed Cardiac Rehab HEP. Pt completes 1 set x 10 reps of each exercise with fair understanding. Encouraged to complete 3x/daily to facilitate increased activity tolerance. Pt will require additional education to ensure carryover.     Vitals: WNL    Pain: 0 VAS  Location: NA  Interventions for pain: N/A  Education: Provided education on the importance of mobility in the acute care setting, ADL training, and Post-Op Precautions      Assessment: Sara Chaves presents with ADL impairments affecting function including endurance / activity tolerance and strength. Demonstrated functioning below baseline abilities indicate the need for continued skilled intervention while inpatient. Tolerating session today without incident. Will continue to follow and progress as tolerated.     Plan/Recommendations:   Low Intensity Therapy recommended post-acute care - This is recommended as therapy feels this patient would require 2-3 visits per week. OP or HH would be the best option depending on patient's home bound status. Consider, if the patient has other  \"skilled\" needs such as wounds, IV antibiotics, etc. Combined with \"low intensity\" could also equate to a SNF. If patient is medically complex, consider LTAC.. Pt requires no DME at discharge.     Pt desires Home with family assist at discharge. Pt cooperative; agreeable to " therapeutic recommendations and plan of care.     Modified [place]: N/A = No pre-op stroke/TIA    Post-Tx Position: Up in Chair, Alarms activated, and Call light and personal items within reach  PPE: gloves

## 2023-08-15 NOTE — DISCHARGE PLACEMENT REQUEST
"Sara Berger (62 y.o. Female)       Date of Birth   1960    Social Security Number       Address   [address] [place] [address]    Home Phone   [phone]    MRN   [id number]       Jewish   None    Marital Status                               Admission Date   8/9/23    Admission Type   Elective    Admitting Provider   Noe Clements MD    Attending Provider       Department, Room/Bed   [facility] CARDIOVASCULAR CARE UNIT, 2202/1       Discharge Date   8/15/2023    Discharge Disposition   Home or Self Care    Discharge Destination                                 Attending Provider: (none)   Allergies: No Known Allergies    Isolation: None   Infection: None   Code Status: CPR    Ht: 157.5 cm (62\")   Wt: 85.5 kg (188 lb 7.9 oz)    Admission Cmt: None   Principal Problem: Coronary artery disease involving native coronary artery of native heart with unstable angina pectoris [I25.110]                   Active Insurance as of 8/9/2023       Primary Coverage       Payor Plan Insurance Group Employer/Plan Group    AETNA COMMERCIAL AETNA [id number]       Payor Plan Address Payor Plan Phone Number Payor Plan Fax Number Effective Dates    [address] [phone]  4/1/2018 - None Entered    [facility] [id number]         Subscriber Name Subscriber Birth Date Member ID       KATE BERGER 11/10/1958 [id number]                     Emergency Contacts        (Rel.) Home Phone Work Phone Mobile Phone    KATE BERGER (Spouse) -- -- [phone]              "

## 2023-08-15 NOTE — PLAN OF CARE
"Sara Chaves presents with functional mobility impairments which indicate the need for skilled intervention. Tolerating session today without incident. Stair training and gait training without AD completed this date.  Pt able to compete stair training with SBA-CGA and able to ambulate long distances without AD and SUP.  Pt has strong support of  who will be able to assist at home as needed.Will continue to follow and progress as tolerated.     Plan/Recommendations:   Low Intensity Therapy recommended post-acute care - This is recommended as therapy feels this patient would require 2-3 visits per week. OP or HH would be the best option depending on patient's home bound status. Consider, if the patient has other  \"skilled\" needs such as wounds, IV antibiotics, etc. Combined with \"low intensity\" could also equate to a SNF. If patient is medically complex, consider LTAC.. Pt requires no DME at discharge.     Pt desires Home with family assist at discharge. Pt cooperative; agreeable to therapeutic recommendations and plan of care.   "

## 2023-08-15 NOTE — DISCHARGE SUMMARY
Date of Admission: 8/9/23  Date of Discharge:  8/15/2023    Discharge Diagnosis:   -NSTEMI with severe MV CAD, s/p CABG x 4 (LIMA to LAD, SVG to Diagonal, SVG to OM, SVG to RCA)  -HTN  -HLD  -Tobacco abuse--encouraged cessation    Presenting Problem/History of Present Illness  CAD (coronary artery disease) [I25.10]     Hospital Course  Patient is a 62 y.o. female presented [facility] with chest discomfort progressively worsening over the past several weeks. Further history includes HTN, HLD, and tobacco abuse. She denies other medical problems. She had a severe episode of chest pain with associated diaphoresis, nausea, and vomiting which brought her to the emergency department.  Today she underwent LHC and was found to have severe three-vessel CAD with moderate LAD stenosis with IFR of the LAD of 0.87, D1 with 90% stenosis, LCX with 100% occluded in the mid segment with left to left collaterals filling the OMs, 70% mRCA and 99% dRCA stenosis. She had an echocardiogram which showed preserved LV function with an LVEF of 60% with grade 1 diastolic dysfunction and no evidence of valvular dysfunction. She was referred to CTS by Dr. Salcido and brought to [facility] for possible surgical revascularization.  She was seen and evaluated by Dr. Clements who recommended CABG.  On 8/11 she underwent CABG x4 with LIMA to the LAD, SVG to diagonal, SVG to OM, and SVG to RCA with Dr. Clements.  Postoperatively she did well.  She was extubated day of surgery and weaned from vasopressors and inotropes.  Wires and tubes were removed in the usual fashion.  She was IV diuresed.  She had an overnight oximetry study on postop day 3 that showed the need for nocturnal home oxygen.  On the morning of postop day 4 she was deemed stable for discharge home.  She is tolerating the current medication regimen.  Plavix was added on day of discharge for NSTEMI presentation.  She is eating and drinking appropriately as well as urinating and  defecating adequately.  Sternal precautions reviewed as well as signs and symptoms of sternal wound infection.  Questions answered with verbalized understanding.  She is to follow-up with cardiology as scheduled and in our office on 8/31 at 9:15 AM.    Procedures Performed  Procedure(s):  CORONARY ARTERY BYPASS GRAFTING       Consults:   Consults       No orders found from 7/11/2023 to 8/10/2023.            Pertinent Test Results:    Lab Results   Component Value Date    WBC 9.30 08/15/2023    HGB 9.1 (L) 08/15/2023    HCT 26.9 (L) 08/15/2023    MCV 91.5 08/15/2023     08/15/2023      Lab Results   Component Value Date    GLUCOSE 107 (H) 08/14/2023    CALCIUM 9.2 08/14/2023     08/14/2023    K 4.0 08/15/2023    CO2 28.0 08/14/2023     08/14/2023    BUN 20 08/14/2023    CREATININE 0.69 08/14/2023    BCR 29.0 (H) 08/14/2023    ANIONGAP 9.0 08/14/2023     Lab Results   Component Value Date    INR 1.01 08/12/2023    PROTIME 10.8 08/12/2023     Condition on Discharge: Stable    Vital Signs  Temp:  [98.4 øF (36.9 øC)-99.7 øF (37.6 øC)] 98.9 øF (37.2 øC)  Heart Rate:  [71-95] 78  Resp:  [14-26] 20  BP: ()/(49-84) 115/69      Discharge Disposition  Home or Self Care    Discharge Medications     Discharge Medications        New Medications        Instructions Start Date   bumetanide 2 MG tablet  Commonly known as: BUMEX   2 mg, Oral, Daily      clopidogrel 75 MG tablet  Commonly known as: PLAVIX   75 mg, Oral, Daily      guaiFENesin 600 MG 12 hr tablet  Commonly known as: MUCINEX   600 mg, Oral, Every 12 Hours Scheduled      HYDROcodone-acetaminophen  MG per tablet  Commonly known as: NORCO   1 tablet, Oral, Every 4 Hours PRN      metoprolol tartrate 25 MG tablet  Commonly known as: LOPRESSOR   6.25 mg, Oral, Every 12 Hours Scheduled      potassium chloride 20 MEQ CR tablet  Commonly known as: K-DUR,KLOR-CON   20 mEq, Oral, Daily             Continue These Medications        Instructions Start  Date   ASPIRIN 81 PO   81 mg, Oral, Daily      fish oil 1000 MG capsule capsule   1,000 mg, Oral, Nightly      PARoxetine 20 MG tablet  Commonly known as: PAXIL   1 tablet, Oral, Daily      pravastatin 80 MG tablet  Commonly known as: PRAVACHOL   TAKE ONE TABLET BY MOUTH EVERY DAY             Stop These Medications      lisinopril-hydrochlorothiazide 20-12.5 MG per tablet  Commonly known as: PRINZIDE,ZESTORETIC              Discharge Diet:     Activity at Discharge:   1. No driving for 2 weeks and off narcotic pain medications.  2. Shower daily. Clean incisions with warm water and antibacterial soap only. Do not put any lotion or ointments on incisions.  3. Ambulate for 10 minutes at least 3 times a day.  4. No heavy lifting > 10lbs until seen in office.   5. Take all medications as prescribed.     Follow-up Appointments  Future Appointments   Date Time Provider Department Center   8/31/2023  9:15 AM Ryann Meyer APRN [facility]   11/14/2023  3:00 PM Royal Gonzales MD [facility]     Additional Instructions for the Follow-ups that You Need to Schedule       Ambulatory Referral to Cardiac Rehab   As directed      Call MD With Problems / Concerns   As directed      Instructions:  Call office at [phone] for any drainage, increased redness, or fever over 100.5    Order Comments: Instructions:  Call office at [phone] for any drainage, increased redness, or fever over 100.5         Discharge Follow-up with PCP   As directed       Currently Documented PCP:    Provider, No Known    PCP Phone Number:    None     Follow Up Details: in 1 week        Discharge Follow-up with Specialty: Cardiologist CELY/PA; 1 Week   As directed      Specialty: Cardiologist APRN/PA   Follow Up: 1 Week   Follow Up Details: bring all prescription bottles to appointment, call for appointment        Discharge Follow-up with Specified Provider: Cardiologist; 1 Month   As directed      To: Cardiologist    Follow Up: 1 Month   Follow Up Details: call for appointment, bring all medication bottles to appointment        Discharge Follow-up with Specified Provider:    As directed      To:    Follow Up Details: 4-6 weeks, bring all current medications to appointment                Test Results Pending at Discharge    STS; patient discharged home on aspirin, statin, and Lopressor       Zayda De Santiago, APRN  08/15/23  10:22 EDT

## 2023-08-16 ENCOUNTER — TRANSITIONAL CARE MANAGEMENT TELEPHONE ENCOUNTER (OUTPATIENT)
Dept: CALL CENTER | Facility: HOSPITAL | Age: 63
End: 2023-08-16
Payer: COMMERCIAL

## 2023-08-16 NOTE — OUTREACH NOTE
Call Center TCM Note      Flowsheet Row Responses   [facility], [place], operated by [facility] patient discharged from? [facility]   Does the patient have one of the following disease processes/diagnoses(primary or secondary)? Cardiothoracic surgery   TCM attempt successful? Yes   Call start time 1141   Call end time 1146   Discharge diagnosis CABG x4 with unstable angina pectoris/NSTEMI with severe MV CAD   Meds reviewed with patient/caregiver? Yes   Is the patient having any side effects they believe may be caused by any medication additions or changes? No   Does the patient have all medications related to this admission filled (includes all antibiotics, pain medications, cardiac medications, etc.) Yes   Is the patient taking all medications as directed (includes completed medication regime)? Yes   Comments Hosp dc fu apt/ New pt apt with PCP on 8/22/23,  post op apt 8/31/23   Does the patient have an appointment with their PCP within 7-14 days of discharge? Yes   Has home health visited the patient within 72 hours of discharge? N/A   What DME was ordered? Home Oxygen--[facility] (nocturnal oxygen)   Has all DME been delivered? Yes   DME comments Only uses O2 at HS (1-2L),  No pulse ox   Psychosocial issues? No   Did the patient receive a copy of their discharge instructions? Yes   Nursing interventions Reviewed instructions with patient   What is the patient's perception of their health status since discharge? Improving   Nursing interventions Nurse provided patient education   Is the patient/caregiver able to teach back normal signs of recovery? Nausea and lack of appetite, Depression or irritability, Pain or discomfort at incisional site, Constipation   Nursing interventions Reassured on normal signs of recovery   Is the patient /caregiver able to teach back basic post-op care? Continue use of incentive spirometry at least 1 week post discharge, Shower daily, No tub bath, swimming, or hot tub until instructed by MD, Use a  clean wash cloth and antibacterial bar or liquid soap to clean incisions, If the steri-strips are falling off, it is okay to remove them. (If applicable), Lifting as instructed by MD in discharge instructions, Drive as instructed by MD in discharge instructions, Hold pillow to support chest when coughing, Practice cough and deep breath every 4 hours while awake   Is the patient/caregiver able to teach back signs and symptoms of incisional infection? Increased redness, swelling or pain at the incisonal site, Increased drainage or bleeding, Incisional warmth, Pus or odor from incision, Fever   Is the patient/caregiver able to teach back steps to recovery at home? Set small, achievable goals for return to baseline health, Rest and rebuild strength, gradually increase activity, Weigh daily, Practice good oral hygiene, Eat a well-balance diet, Make a list of questions for surgeon's appointment   Is the patient /caregiver able to teach back the importance of cardiac rehab? Yes  [pt states she was told she may not have to have cardiac rehab-options will be addressed at FU apt ]   Nursing interventions Provided education on importance of cardiac rehab   If the patient is a current smoker, are they able to teach back resources for cessation? Not a smoker, [id number]  [Quit cold turkey per pt ]   Is the patient/caregiver able to teach back the hierarchy of who to call/visit for symptoms/problems? PCP, Specialist, Home health nurse, Urgent Care, ED, 911 Yes   TCM call completed? Yes   Call end time [id number]            Karena Dougherty RN    8/16/2023, 11:46 EDT

## 2023-08-19 NOTE — PROGRESS NOTES
"Enter Query Response Below      Query Response: Type 2 MI due to fixed CAD.              If applicable, please update the problem list.   Patient: Sara Chaves        : 1960  Account: [id number]           Admit Date:         How to Respond to this query:       a. Click New Note     b. Answer query within the yellow box.                c. Update the Problem List, if applicable.      If you have any questions about this query contact me at: [phone].     :    Patient transferred from OSH on  with [facility] for further workup and management.    Per H&P \"underwent LHC and was found to have severe three-vessel CAD with moderate LAD stenosis with IFR of the LAD of 0.87, D1 with 90% stenosis, LCX with 100% occluded in the mid segment with left to left collaterals filling the OMs, 70% mRCA and 99% dRCA stenosis.\"   S/p Cabg x 4.  \"NSTEMI\" is documented on the H&P and in the progress notes on 8/10 and .    Please clarify the type of NSTEMI the patient was treated/monitored for:    Type 1 MI due to ______(please specify- plaque erosion, rupture, fissure or thrombus)  Type 2 MI due to fixed CAD.  Other- specify______  Unable to determine      By submitting this query, we are merely seeking further clarification of documentation to accurately reflect all conditions that you are monitoring, evaluating, treating or that extend the hospitalization or utilize additional resources of care. Please utilize your independent clinical judgment when addressing the question(s) above.     This query and your response, once completed, will be entered into the legal medical record.    Sincerely,  Sia Charles RN  Clinical Documentation Integrity Program     "

## 2023-08-22 ENCOUNTER — OFFICE VISIT (OUTPATIENT)
Dept: FAMILY MEDICINE CLINIC | Facility: CLINIC | Age: 63
End: 2023-08-22
Payer: COMMERCIAL

## 2023-08-22 VITALS
WEIGHT: 179.4 LBS | RESPIRATION RATE: 18 BRPM | HEIGHT: 62 IN | OXYGEN SATURATION: 96 % | DIASTOLIC BLOOD PRESSURE: 67 MMHG | HEART RATE: 80 BPM | SYSTOLIC BLOOD PRESSURE: 89 MMHG | BODY MASS INDEX: 33.01 KG/M2

## 2023-08-22 DIAGNOSIS — Z09 HOSPITAL DISCHARGE FOLLOW-UP: Primary | ICD-10-CM

## 2023-08-22 DIAGNOSIS — J81.0 ACUTE PULMONARY EDEMA: ICD-10-CM

## 2023-08-22 DIAGNOSIS — I10 BENIGN HYPERTENSION: ICD-10-CM

## 2023-08-22 DIAGNOSIS — E78.2 MIXED HYPERLIPIDEMIA: ICD-10-CM

## 2023-08-22 DIAGNOSIS — I50.31 ACUTE CONGESTIVE HEART FAILURE WITH LEFT VENTRICULAR DIASTOLIC DYSFUNCTION: ICD-10-CM

## 2023-08-22 DIAGNOSIS — I25.110 CORONARY ARTERY DISEASE INVOLVING NATIVE CORONARY ARTERY OF NATIVE HEART WITH UNSTABLE ANGINA PECTORIS: ICD-10-CM

## 2023-08-22 DIAGNOSIS — Z95.1 S/P CABG X 4: ICD-10-CM

## 2023-08-22 DIAGNOSIS — I21.4 NSTEMI (NON-ST ELEVATED MYOCARDIAL INFARCTION): ICD-10-CM

## 2023-08-22 PROBLEM — F41.9 ANXIETY DISORDER: Status: ACTIVE | Noted: 2023-08-22

## 2023-08-22 PROBLEM — E78.5 HYPERLIPIDEMIA: Status: ACTIVE | Noted: 2023-08-22

## 2023-08-22 PROBLEM — F41.9 ANXIETY DISORDER: Chronic | Status: ACTIVE | Noted: 2023-08-22

## 2023-08-22 PROCEDURE — 99496 TRANSJ CARE MGMT HIGH F2F 7D: CPT | Performed by: FAMILY MEDICINE

## 2023-08-22 NOTE — PROGRESS NOTES
Subjective   Sara Chaves is a 62 y.o. female.   Chief Complaint   Patient presents with    Hospital Follow Up Visit    Coronary Artery Disease       History of Present Illness   Presents to the office today as a new patient to me.  She was told when she was hospitalized recently that she needed to get a family doctor.  She was not feeling well went to [facility] on August 9.  She was diagnosed with non-ST elevation MI.  She had a heart catheterization which showed three-vessel disease.  She underwent coronary artery bypass graft x3 by Dr. Noble.  She was discharged home on August 15.  She has been home now 7 days.  On discharge she was started on Bumex 2 mg/day due to persisting pleural effusions following her surgery.  She was started on Plavix 75 mg/day, guaifenesin for cough, Norco 10 for pain as needed metoprolol tartrate 25 mg one quarter of a tablet every 12 hours, potassium chloride 20 mEq/day.  She was instructed to continue pravastatin 80 mg/day, Paxil 20 mg/day, fish oil, 81 mg of aspirin per day.  Dr. Keane is her cardiologist.    Since being home she has continued to feel little bit stronger each day.  She has a little swelling in the right ankle, but none in the left.  Lips are starting to get dry.  Weight is 179.4 pounds today.  She was 188 pounds when she went to the hospital.    Blood pressure today is a little low at 89/67.  She is not dizzy or lightheaded.  She is not having any chest pain or pressure.  Sleeping okay.  Appetite is coming back.  Heart rate is 80.      Prior to her heart attack she had known high blood pressure, high cholesterol and was treating those problems.      Patient Active Problem List    Diagnosis Date Noted    Anxiety disorder 08/22/2023     Note Last Updated: 8/22/2023     On Paxil for years - helps      Hyperlipidemia 08/22/2023    Benign hypertension 08/22/2023    Acute congestive heart failure with left ventricular diastolic dysfunction 08/22/2023     S/P CABG x 4 08/22/2023     Note Last Updated: 8/22/2023 8/11/23      Coronary artery disease involving native coronary artery of native heart with unstable angina pectoris 08/09/2023     Note Last Updated: 8/9/2023     Added automatically from request for surgery [id number]      NSTEMI (non-ST elevated myocardial infarction) 08/08/2023           Past Surgical History:   Procedure Laterality Date    CARDIAC SURGERY      CORONARY ARTERY BYPASS GRAFT N/A 8/11/2023    Procedure: CORONARY ARTERY BYPASS GRAFTING;  Surgeon: Noe Clements MD;  Location: [facility];  Service: Cardiothoracic;  Laterality: N/A;  CABG X4 including HOPPER    KNEE ACL RECONSTRUCTION Right      Current Outpatient Medications on File Prior to Visit   Medication Sig    ASPIRIN 81 PO Take 81 mg by mouth Daily.    bumetanide (BUMEX) 2 MG tablet Take 1 tablet by mouth Daily.    clopidogrel (PLAVIX) 75 MG tablet Take 1 tablet by mouth Daily.    guaiFENesin (MUCINEX) 600 MG 12 hr tablet Take 1 tablet by mouth Every 12 (Twelve) Hours for 14 days.    HYDROcodone-acetaminophen (NORCO)  MG per tablet Take 1 tablet by mouth Every 4 (Four) Hours As Needed for Moderate Pain.    metoprolol tartrate (LOPRESSOR) 25 MG tablet Take 0.25 tablets by mouth Every 12 (Twelve) Hours.    Omega-3 Fatty Acids (fish oil) 1000 MG capsule capsule Take 1 capsule by mouth Every Night.    PARoxetine (PAXIL) 20 MG tablet Take 1 tablet by mouth Daily.    potassium chloride (K-DUR,KLOR-CON) 20 MEQ CR tablet Take 1 tablet by mouth Daily.    pravastatin (PRAVACHOL) 80 MG tablet TAKE ONE TABLET BY MOUTH EVERY DAY     No current facility-administered medications on file prior to visit.     No Known Allergies  Social History     Socioeconomic History    Marital status:    Tobacco Use    Smoking status: Former     Packs/day: 1.00     Years: 28.00     Pack years: 28.00     Types: Cigarettes     Start date: 8/18/1995     Quit date: 8/6/2023     Years since quitting:  "0.0     Passive exposure: Current    Smokeless tobacco: Never   Vaping Use    Vaping Use: Never used   Substance and Sexual Activity    Alcohol use: Never    Drug use: Not Currently    Sexual activity: Yes     Partners: Male     Family History   Problem Relation Age of Onset    Hypertension Mother     Breast cancer Mother     Hypertension Father        Review of Systems    Objective   BP (!) 89/67 (BP Location: Right arm, Patient Position: Sitting, Cuff Size: Adult)   Pulse 80   Resp 18   Ht 157.5 cm (62\")   Wt 81.4 kg (179 lb 6.4 oz)   LMP  (LMP Unknown)   SpO2 96%   Breastfeeding No   BMI 32.81 kg/mý   Physical Exam  Constitutional:       Appearance: She is well-developed.      Comments:      HENT:      Head: Normocephalic and atraumatic.   Eyes:      Conjunctiva/sclera: Conjunctivae normal.   Cardiovascular:      Rate and Rhythm: Normal rate and regular rhythm.      Heart sounds: No murmur heard.  Pulmonary:      Effort: Pulmonary effort is normal. No respiratory distress.      Breath sounds: Normal breath sounds. No rales (with attention paid to bases).   Chest:      Comments: Healing midline sternotomy scar is evident.  There is no drainage.  No fluctuance.  No induration.  Not tender.  Musculoskeletal:         General: Normal range of motion.      Cervical back: Normal range of motion.      Right lower leg: No edema.      Left lower leg: No edema.      Comments: She has well-healed incision with sutures still in place just below the knee on the right lower leg.  She has bruising above and below the knee on the right lower leg.  There is trace edema of the right ankle.  No edema of the left ankle.   Skin:     General: Skin is warm and dry.      Findings: No rash.   Neurological:      Mental Status: She is alert and oriented to person, place, and time.   Psychiatric:         Behavior: Behavior normal.         No results displayed because visit has over 200 results.            Assessment & Plan "   Diagnoses and all orders for this visit:    1. Hospital discharge follow-up (Primary)    2. NSTEMI (non-ST elevated myocardial infarction)    3. S/P CABG x 4    4. Coronary artery disease involving native coronary artery of native heart with unstable angina pectoris  -     CBC & Differential  -     Comprehensive Metabolic Panel    5. Acute pulmonary edema  -     XR Chest PA & Lateral    6. Acute congestive heart failure with left ventricular diastolic dysfunction  -     BNP    7. Benign hypertension    8. Mixed hyperlipidemia    Very complicated situation.  I am the first physician she has seen since being discharged from the hospital after a four-way bypass.  Apparently postop course was complicated by pleural effusions.  She is on 2 mg of Bumex daily along with potassium supplementation.  Her weight is down.  She has no swelling in her feet.  I do not hear any crackles at all in her chest.  We will get a chest x-ray today.  It may be time to decrease the Bumex.  Her blood pressure is borderline low at 89/67.  Make sure to increase fluids.  Will not decrease her beta-blocker as tachycardia this short time out after open heart surgery is probably not good.  We will do labs as above to monitor her postop anemia to be sure it is recovering, we will check her electrolytes, renal function.  This should also help guide decision making regarding her diuretic.  Continue beta-blocker, statin, Plavix, aspirin as above.  Follow-up with cardiology per their recommendations.  Follow-up with cardiac surgery per their recommendations.  I would like to see her again in about 6 weeks.  Hopefully by that time she will have been cleared for follow-up by cardiac surgery, anticipate there will been some discussion with cardiology regarding cardiac rehab.  I would like to check up with her at that point to see how she is doing his life is getting a little more back to normal.  I will be following up with her when all the tests are  back as above.        Call with any problems or concerns before next visit       Return in about 6 weeks (around 10/3/2023) for Recheck after CABG.      Much of this report is an electronic transcription of spoken language to printed text using Dragon dictation software.  As such, the subtleties and finesse of spoken language may permit erroneous, or at times, nonsensical words or phrases to be inadvertently transcribed; thus changes may be made at a later date to rectify these errors.     Teetee Mckay MD8/22/202312:56 EDT  This note has been electronically signed

## 2023-08-23 DIAGNOSIS — D72.829 LEUKOCYTOSIS, UNSPECIFIED TYPE: Primary | ICD-10-CM

## 2023-08-23 LAB
ALBUMIN SERPL-MCNC: 4.4 G/DL (ref 3.8–4.9)
ALBUMIN/GLOB SERPL: 1.7 {RATIO} (ref 1.2–2.2)
ALP SERPL-CCNC: 104 IU/L (ref 44–121)
ALT SERPL-CCNC: 20 IU/L (ref 0–32)
AST SERPL-CCNC: 16 IU/L (ref 0–40)
BASOPHILS # BLD AUTO: 0.2 X10E3/UL (ref 0–0.2)
BASOPHILS NFR BLD AUTO: 1 %
BILIRUB SERPL-MCNC: 0.4 MG/DL (ref 0–1.2)
BNP SERPL-MCNC: 139.3 PG/ML (ref 0–100)
BUN SERPL-MCNC: 18 MG/DL (ref 8–27)
BUN/CREAT SERPL: 22 (ref 12–28)
CALCIUM SERPL-MCNC: 9.9 MG/DL (ref 8.7–10.3)
CHLORIDE SERPL-SCNC: 103 MMOL/L (ref 96–106)
CO2 SERPL-SCNC: 24 MMOL/L (ref 20–29)
CREAT SERPL-MCNC: 0.83 MG/DL (ref 0.57–1)
EGFRCR SERPLBLD CKD-EPI 2021: 81 ML/MIN/1.73
EOSINOPHIL # BLD AUTO: 0.5 X10E3/UL (ref 0–0.4)
EOSINOPHIL NFR BLD AUTO: 3 %
ERYTHROCYTE [DISTWIDTH] IN BLOOD BY AUTOMATED COUNT: 13.4 % (ref 11.7–15.4)
GLOBULIN SER CALC-MCNC: 2.6 G/DL (ref 1.5–4.5)
GLUCOSE SERPL-MCNC: 99 MG/DL (ref 70–99)
HCT VFR BLD AUTO: 36 % (ref 34–46.6)
HGB BLD-MCNC: 11.4 G/DL (ref 11.1–15.9)
IMM GRANULOCYTES # BLD AUTO: 0.3 X10E3/UL (ref 0–0.1)
IMM GRANULOCYTES NFR BLD AUTO: 2 %
LYMPHOCYTES # BLD AUTO: 2.5 X10E3/UL (ref 0.7–3.1)
LYMPHOCYTES NFR BLD AUTO: 16 %
MCH RBC QN AUTO: 29.2 PG (ref 26.6–33)
MCHC RBC AUTO-ENTMCNC: 31.7 G/DL (ref 31.5–35.7)
MCV RBC AUTO: 92 FL (ref 79–97)
MONOCYTES # BLD AUTO: 1.1 X10E3/UL (ref 0.1–0.9)
MONOCYTES NFR BLD AUTO: 7 %
NEUTROPHILS # BLD AUTO: 11.2 X10E3/UL (ref 1.4–7)
NEUTROPHILS NFR BLD AUTO: 71 %
PLATELET # BLD AUTO: 495 X10E3/UL (ref 150–450)
POTASSIUM SERPL-SCNC: 4.8 MMOL/L (ref 3.5–5.2)
PROT SERPL-MCNC: 7 G/DL (ref 6–8.5)
RBC # BLD AUTO: 3.9 X10E6/UL (ref 3.77–5.28)
SODIUM SERPL-SCNC: 143 MMOL/L (ref 134–144)
WBC # BLD AUTO: 15.7 X10E3/UL (ref 3.4–10.8)

## 2023-08-24 ENCOUNTER — READMISSION MANAGEMENT (OUTPATIENT)
Dept: CALL CENTER | Facility: HOSPITAL | Age: 63
End: 2023-08-24
Payer: COMMERCIAL

## 2023-08-24 NOTE — OUTREACH NOTE
CT Surgery Week 2 Survey      Flowsheet Row Responses   [facility] patient discharged from? [facility]   Does the patient have one of the following disease processes/diagnoses(primary or secondary)? Cardiothoracic surgery   Week 2 attempt successful? No   Unsuccessful attempts Attempt 1            RAMONA Briones Registered Nurse

## 2023-08-29 ENCOUNTER — OUTSIDE FACILITY SERVICE (OUTPATIENT)
Dept: CARDIOLOGY | Facility: CLINIC | Age: 63
End: 2023-08-29
Payer: COMMERCIAL

## 2023-08-29 DIAGNOSIS — Z95.1 S/P CABG (CORONARY ARTERY BYPASS GRAFT): Primary | ICD-10-CM

## 2023-08-29 RX ORDER — CLOPIDOGREL BISULFATE 75 MG/1
75 TABLET ORAL DAILY
Qty: 90 TABLET | Refills: 3 | Status: SHIPPED | OUTPATIENT
Start: 2023-08-29

## 2023-08-29 RX ORDER — PRAVASTATIN SODIUM 80 MG/1
80 TABLET ORAL DAILY
Qty: 90 TABLET | Refills: 3 | Status: SHIPPED | OUTPATIENT
Start: 2023-08-29

## 2023-08-31 ENCOUNTER — OFFICE VISIT (OUTPATIENT)
Dept: CARDIAC SURGERY | Facility: CLINIC | Age: 63
End: 2023-08-31
Payer: COMMERCIAL

## 2023-08-31 VITALS
OXYGEN SATURATION: 99 % | RESPIRATION RATE: 20 BRPM | WEIGHT: 182 LBS | HEART RATE: 61 BPM | HEIGHT: 62 IN | TEMPERATURE: 97.3 F | BODY MASS INDEX: 33.49 KG/M2 | SYSTOLIC BLOOD PRESSURE: 119 MMHG | DIASTOLIC BLOOD PRESSURE: 82 MMHG

## 2023-08-31 DIAGNOSIS — Z95.1 S/P CABG X 4: Primary | ICD-10-CM

## 2023-08-31 PROCEDURE — 99024 POSTOP FOLLOW-UP VISIT: CPT

## 2023-08-31 NOTE — PROGRESS NOTES
"CARDIOVASCULAR SURGERY FOLLOW-UP PROGRESS NOTE  Chief Complaint: Post     HPI:   Dear Dr. Mckay, Teetee Pineda MD and colleagues:    It was nice to see Sara Chaves in follow up 3 weeks after surgery.  As you know, she is a 63 y.o. female with a history of HTN, HLD, and tobacco abuse. She presented [facility] with chest discomfort progressively worsening over the past several weeks. She underwent LHC which showed severe MV CAD. She was seen and evaluated by Dr. Clements who recommended CABG. On 8/11 she underwent CABG x4 with LIMA to the LAD, SVG to diagonal, SVG to OM, and SVG to RCA with Dr. Clements. She had an uncomplicated post op course and was discharged on POD#4.  She comes in today complaining of nothing.  Her activity level has been good.  From a surgical standpoint, the sternal incision is well approximated without erythema, edema, or drainage.  The sternum is stable to palpation, and the patient denies any popping or clicking with deep inspiration or coughing.      Physical Exam:         /82 (BP Location: Left arm, Patient Position: Sitting, Cuff Size: Adult)   Pulse 61   Temp 97.3 øF (36.3 øC)   Resp 20   Ht 157.5 cm (62\")   Wt 82.6 kg (182 lb)   LMP  (LMP Unknown)   SpO2 99%   BMI 33.29 kg/mý   Heart:  regular rate and rhythm, S1, S2 normal, no murmur, click, rub or gallop  Lungs:  clear to auscultation bilaterally  Extremities:  no edema  Incision(s):  mid chest healing well, no significant drainage, no dehiscence, no significant erythema    Assessment/Plan:     S/P CABG. Overall, she is doing well. She saw Dr. Gonzales recently and he stopped her lasix which I think is appropriate. VS are adequate today so I'm not going to make any changes. She can follow up with us as needed.  Sternal precautions reviewed as well as signs and symptoms of sternal wound infection.  Questions answered with verbalized understanding.    No significant post-op complications    No heavy " lifting > 10 pounds for 3 more weeks  Keep incisions clean and dry  OK to drive if not taking narcotic pain medicine  OK to begin cardiac rehab  Follow-up as scheduled with cardiology  Follow-up as scheduled with PCP  Follow-up with CT surgery prn    Continue lifting restriction of 10 lbs until 6 weeks and 50 lbs until 12 weeks from the date of surgery, no excessive jarring motions or twisting motions until 12 weeks from the date of surgery    Return to clinic if any signs or symptoms of infection or sternal instability develop     Thank you for allowing me to participate in the care of your patient.      Zayda De Santiago, APRN

## 2023-09-12 ENCOUNTER — OUTSIDE FACILITY SERVICE (OUTPATIENT)
Dept: CARDIOLOGY | Facility: CLINIC | Age: 63
End: 2023-09-12
Payer: COMMERCIAL

## 2023-09-12 PROCEDURE — 99214 OFFICE O/P EST MOD 30 MIN: CPT | Performed by: INTERNAL MEDICINE

## 2023-09-12 RX ORDER — LOSARTAN POTASSIUM 50 MG/1
50 TABLET ORAL DAILY
Qty: 180 TABLET | Refills: 3 | Status: SHIPPED | OUTPATIENT
Start: 2023-09-12

## 2023-09-12 RX ORDER — CLOPIDOGREL BISULFATE 75 MG/1
75 TABLET ORAL DAILY
Qty: 90 TABLET | Refills: 3 | Status: SHIPPED | OUTPATIENT
Start: 2023-09-12

## 2023-09-28 ENCOUNTER — TELEPHONE (OUTPATIENT)
Dept: CARDIOLOGY | Facility: CLINIC | Age: 63
End: 2023-09-28
Payer: COMMERCIAL

## 2023-09-28 NOTE — TELEPHONE ENCOUNTER
Caller: Sara Chaves     Best call back number: [phone]     What is your medical concern? PT STATES SHE IS 7 WEEK POST OP FOR A BYPASS PROCEDURE AND SHE HAS BEEN IN CARDIAC REHAB WHERE THEY TOLD HER THAT HER BP HAS BEEN RUNNING HIGH AND TO CONTACT CARDIOLOGIST OFFICE, PT REACHED OUT YESTERDAY AND LEFT A VM BUT HASN'T HEARD BACK AND PT SEEKING ADVISEMENT - PT VICTOR MANUEL ACTIVE SYMPTOMS WHILE ON PHONE - PT STATES HER BP IS RUNNING 142/104. READING /94 THIS MORNING, BOTTOM NUMBER IS NEVER LOWER THAN 94 AND TOP NUMBER STAYS ABOVE 130'S - PT NOT SURE IF MEDICATION NEEDS CHANGING BUT SHE IS SEEKING ADVISEMENT

## 2023-09-29 NOTE — TELEPHONE ENCOUNTER
Royal Gonzales MD Nichols, Janet E, MA  She was prescribed losartan 50 mg p.o. once a day when I saw her in the office last week  Does not look like she feels the medication  I told the patient to  the losartan 50 mg p.o. once a day that was prescribed in the office sent to [facility] pharmacy  She knows about taking the medication and starting it today.

## 2023-10-04 RX ORDER — LISINOPRIL AND HYDROCHLOROTHIAZIDE 20; 12.5 MG/1; MG/1
TABLET ORAL
Qty: 90 TABLET | Refills: 3 | OUTPATIENT
Start: 2023-10-04

## 2023-10-10 RX ORDER — AMLODIPINE BESYLATE 5 MG/1
5 TABLET ORAL DAILY
Qty: 90 TABLET | Refills: 3 | Status: SHIPPED | OUTPATIENT
Start: 2023-10-10

## 2023-11-07 ENCOUNTER — OUTSIDE FACILITY SERVICE (OUTPATIENT)
Dept: CARDIOLOGY | Facility: CLINIC | Age: 63
End: 2023-11-07
Payer: COMMERCIAL

## 2023-11-07 PROCEDURE — 99214 OFFICE O/P EST MOD 30 MIN: CPT | Performed by: INTERNAL MEDICINE

## 2023-11-07 RX ORDER — CLOPIDOGREL BISULFATE 75 MG/1
75 TABLET ORAL DAILY
Qty: 90 TABLET | Refills: 3 | Status: SHIPPED | OUTPATIENT
Start: 2023-11-07

## 2023-11-07 RX ORDER — EZETIMIBE 10 MG/1
10 TABLET ORAL DAILY
Qty: 90 TABLET | Refills: 3 | Status: SHIPPED | OUTPATIENT
Start: 2023-11-07

## 2023-11-07 RX ORDER — AMLODIPINE BESYLATE 5 MG/1
5 TABLET ORAL DAILY
Qty: 90 TABLET | Refills: 3 | Status: SHIPPED | OUTPATIENT
Start: 2023-11-07

## 2023-11-07 RX ORDER — LOSARTAN POTASSIUM 100 MG/1
100 TABLET ORAL DAILY
Qty: 90 TABLET | Refills: 3 | Status: SHIPPED | OUTPATIENT
Start: 2023-11-07

## 2023-11-07 RX ORDER — PRAVASTATIN SODIUM 80 MG/1
80 TABLET ORAL DAILY
Qty: 90 TABLET | Refills: 3 | Status: SHIPPED | OUTPATIENT
Start: 2023-11-07

## 2024-03-14 ENCOUNTER — APPOINTMENT (OUTPATIENT)
Dept: GENERAL RADIOLOGY | Facility: HOSPITAL | Age: 64
End: 2024-03-14
Payer: COMMERCIAL

## 2024-03-14 ENCOUNTER — HOSPITAL ENCOUNTER (EMERGENCY)
Facility: HOSPITAL | Age: 64
Discharge: HOME | End: 2024-03-14
Attending: EMERGENCY MEDICINE
Payer: COMMERCIAL

## 2024-03-14 VITALS
DIASTOLIC BLOOD PRESSURE: 78 MMHG | RESPIRATION RATE: 18 BRPM | WEIGHT: 190.04 LBS | SYSTOLIC BLOOD PRESSURE: 123 MMHG | BODY MASS INDEX: 34.97 KG/M2 | TEMPERATURE: 98 F | HEART RATE: 58 BPM | HEIGHT: 62 IN | OXYGEN SATURATION: 99 %

## 2024-03-14 DIAGNOSIS — M79.602 PAIN OF LEFT UPPER EXTREMITY: ICD-10-CM

## 2024-03-14 DIAGNOSIS — N39.0 ACUTE UTI: Primary | ICD-10-CM

## 2024-03-14 LAB
ANION GAP SERPL CALCULATED.3IONS-SCNC: 11 MMOL/L (ref 5–15)
APTT PPP: 26.9 SECONDS (ref 61–76.5)
BACTERIA UR QL AUTO: ABNORMAL /HPF
BASOPHILS # BLD AUTO: 0.11 10*3/MM3 (ref 0–0.2)
BASOPHILS NFR BLD AUTO: 1.2 % (ref 0–1.5)
BILIRUB UR QL STRIP: NEGATIVE
BUN SERPL-MCNC: 11 MG/DL (ref 8–23)
BUN/CREAT SERPL: 17.5 (ref 7–25)
CALCIUM SPEC-SCNC: 10 MG/DL (ref 8.6–10.5)
CHLORIDE SERPL-SCNC: 107 MMOL/L (ref 98–107)
CLARITY UR: ABNORMAL
CO2 SERPL-SCNC: 26 MMOL/L (ref 22–29)
COLOR UR: ABNORMAL
CREAT SERPL-MCNC: 0.63 MG/DL (ref 0.57–1)
DEPRECATED RDW RBC AUTO: 43.3 FL (ref 37–54)
EGFRCR SERPLBLD CKD-EPI 2021: 101.1 ML/MIN/1.73
EOSINOPHIL # BLD AUTO: 0.21 10*3/MM3 (ref 0–0.4)
EOSINOPHIL NFR BLD AUTO: 2.2 % (ref 0.3–6.2)
ERYTHROCYTE [DISTWIDTH] IN BLOOD BY AUTOMATED COUNT: 13 % (ref 12.3–15.4)
GEN 5 2HR TROPONIN T REFLEX: <6 NG/L
GLUCOSE SERPL-MCNC: 87 MG/DL (ref 65–99)
GLUCOSE UR STRIP-MCNC: NEGATIVE MG/DL
HCT VFR BLD AUTO: 45.9 % (ref 34–46.6)
HGB BLD-MCNC: 14.9 G/DL (ref 12–15.9)
HGB UR QL STRIP.AUTO: ABNORMAL
HOLD SPECIMEN: NORMAL
HYALINE CASTS UR QL AUTO: ABNORMAL /LPF
IMM GRANULOCYTES # BLD AUTO: 0.04 10*3/MM3 (ref 0–0.05)
IMM GRANULOCYTES NFR BLD AUTO: 0.4 % (ref 0–0.5)
INR PPP: 0.97 (ref 0.93–1.1)
KETONES UR QL STRIP: NEGATIVE
LEUKOCYTE ESTERASE UR QL STRIP.AUTO: ABNORMAL
LYMPHOCYTES # BLD AUTO: 1.83 10*3/MM3 (ref 0.7–3.1)
LYMPHOCYTES NFR BLD AUTO: 19.2 % (ref 19.6–45.3)
MCH RBC QN AUTO: 29.3 PG (ref 26.6–33)
MCHC RBC AUTO-ENTMCNC: 32.5 G/DL (ref 31.5–35.7)
MCV RBC AUTO: 90.4 FL (ref 79–97)
MONOCYTES # BLD AUTO: 0.59 10*3/MM3 (ref 0.1–0.9)
MONOCYTES NFR BLD AUTO: 6.2 % (ref 5–12)
NEUTROPHILS NFR BLD AUTO: 6.74 10*3/MM3 (ref 1.7–7)
NEUTROPHILS NFR BLD AUTO: 70.8 % (ref 42.7–76)
NITRITE UR QL STRIP: NEGATIVE
NRBC BLD AUTO-RTO: 0 /100 WBC (ref 0–0.2)
NT-PROBNP SERPL-MCNC: 70.3 PG/ML (ref 0–900)
PH UR STRIP.AUTO: <=5 [PH] (ref 5–8)
PLATELET # BLD AUTO: 244 10*3/MM3 (ref 140–450)
PMV BLD AUTO: 9.8 FL (ref 6–12)
POTASSIUM SERPL-SCNC: 3.9 MMOL/L (ref 3.5–5.2)
PROT UR QL STRIP: NEGATIVE
PROTHROMBIN TIME: 10.6 SECONDS (ref 9.6–11.7)
QT INTERVAL: 453 MS
QTC INTERVAL: 446 MS
RBC # BLD AUTO: 5.08 10*6/MM3 (ref 3.77–5.28)
RBC # UR STRIP: ABNORMAL /HPF
REF LAB TEST METHOD: ABNORMAL
SODIUM SERPL-SCNC: 144 MMOL/L (ref 136–145)
SP GR UR STRIP: 1.03 (ref 1–1.03)
SQUAMOUS #/AREA URNS HPF: ABNORMAL /HPF
TROPONIN T DELTA: NORMAL
TROPONIN T SERPL HS-MCNC: <6 NG/L
UROBILINOGEN UR QL STRIP: ABNORMAL
WBC # UR STRIP: ABNORMAL /HPF
WBC NRBC COR # BLD AUTO: 9.52 10*3/MM3 (ref 3.4–10.8)

## 2024-03-14 PROCEDURE — 83880 ASSAY OF NATRIURETIC PEPTIDE: CPT | Performed by: NURSE PRACTITIONER

## 2024-03-14 PROCEDURE — 93005 ELECTROCARDIOGRAM TRACING: CPT | Performed by: PHYSICIAN ASSISTANT

## 2024-03-14 PROCEDURE — 85610 PROTHROMBIN TIME: CPT | Performed by: NURSE PRACTITIONER

## 2024-03-14 PROCEDURE — 84484 ASSAY OF TROPONIN QUANT: CPT | Performed by: NURSE PRACTITIONER

## 2024-03-14 PROCEDURE — 99284 EMERGENCY DEPT VISIT MOD MDM: CPT

## 2024-03-14 PROCEDURE — 36415 COLL VENOUS BLD VENIPUNCTURE: CPT

## 2024-03-14 PROCEDURE — 96365 THER/PROPH/DIAG IV INF INIT: CPT

## 2024-03-14 PROCEDURE — 87086 URINE CULTURE/COLONY COUNT: CPT | Performed by: PHYSICIAN ASSISTANT

## 2024-03-14 PROCEDURE — 85730 THROMBOPLASTIN TIME PARTIAL: CPT | Performed by: NURSE PRACTITIONER

## 2024-03-14 PROCEDURE — 93005 ELECTROCARDIOGRAM TRACING: CPT

## 2024-03-14 PROCEDURE — 71045 X-RAY EXAM CHEST 1 VIEW: CPT

## 2024-03-14 PROCEDURE — 80048 BASIC METABOLIC PNL TOTAL CA: CPT | Performed by: NURSE PRACTITIONER

## 2024-03-14 PROCEDURE — 85025 COMPLETE CBC W/AUTO DIFF WBC: CPT | Performed by: NURSE PRACTITIONER

## 2024-03-14 PROCEDURE — 25010000002 CEFTRIAXONE PER 250 MG: Performed by: PHYSICIAN ASSISTANT

## 2024-03-14 PROCEDURE — 81001 URINALYSIS AUTO W/SCOPE: CPT | Performed by: PHYSICIAN ASSISTANT

## 2024-03-14 RX ORDER — SODIUM CHLORIDE 0.9 % (FLUSH) 0.9 %
10 SYRINGE (ML) INJECTION AS NEEDED
Status: DISCONTINUED | OUTPATIENT
Start: 2024-03-14 | End: 2024-03-14 | Stop reason: HOSPADM

## 2024-03-14 RX ORDER — AMOXICILLIN AND CLAVULANATE POTASSIUM 875; 125 MG/1; MG/1
1 TABLET, FILM COATED ORAL 2 TIMES DAILY
Qty: 14 TABLET | Refills: 0 | Status: SHIPPED | OUTPATIENT
Start: 2024-03-14 | End: 2024-03-21

## 2024-03-14 RX ADMIN — CEFTRIAXONE 2000 MG: 2 INJECTION, POWDER, FOR SOLUTION INTRAMUSCULAR; INTRAVENOUS at 20:58

## 2024-03-14 NOTE — ED PROVIDER NOTES
"Subjective   Provider in Triage Note  63 yof with complaints of \"not feeling well\" last couple days. CABG 8/2023.  States she has been compliantly taking her medications. started with nonreproducible atraumatic left arm achiness/pain today. No SOA.  Significant other notes that she has been sleeping \"all the time\" for the last couple weeks.  Denies any fever abdominal pain nausea vomiting diarrhea urinary complaints melena hematochezia      Due to significant overcrowding in the emergency department patient was initially seen and evaluated in triage.  Provider in triage recommended patient placement in the treatment area to initiate therapy and movement to an ER bed as soon as possible.   Orders placed; medications will be deferred to main provider per protocol.        History of Present Illness    HPI reviewed and same as above.  Patient states her left arm achiness and pain is not worse with exertion or better with rest.  Patient does report to me that she has a bruise of her left arm and somewhat does reproduce her pain at this location.  Patient does report lifting heavy things yesterday with her spouse that was an unusual activity for them.    Review of Systems   Constitutional:  Positive for fatigue. Negative for chills and fever.   HENT:  Negative for congestion, sore throat, tinnitus and trouble swallowing.    Eyes:  Negative for photophobia, discharge and visual disturbance.   Respiratory:  Negative for cough and shortness of breath.    Cardiovascular:  Negative for chest pain and leg swelling.   Gastrointestinal:  Negative for abdominal pain, diarrhea, nausea and vomiting.   Genitourinary:  Negative for dysuria, flank pain and urgency.   Musculoskeletal:  Negative for arthralgias and myalgias.        Left upper arm pain.   Skin:  Negative for rash.   Neurological:  Negative for dizziness and headaches.   Psychiatric/Behavioral:  Negative for confusion.        Past Medical History:   Diagnosis Date    " Coronary artery disease     Hyperlipidemia     Hypertension        No Known Allergies    Past Surgical History:   Procedure Laterality Date    CARDIAC SURGERY      CORONARY ARTERY BYPASS GRAFT N/A 2023    Procedure: CORONARY ARTERY BYPASS GRAFTING;  Surgeon: Noe Clements MD;  Location: [facility];  Service: Cardiothoracic;  Laterality: N/A;  CABG X4 including HOPPER    KNEE ACL RECONSTRUCTION Right        Family History   Problem Relation Age of Onset    Hypertension Mother     Breast cancer Mother     Hypertension Father        Social History     Socioeconomic History    Marital status:    Tobacco Use    Smoking status: Former     Current packs/day: 0.00     Average packs/day: 1 pack/day for 28.0 years (28.0 ttl pk-yrs)     Types: Cigarettes     Start date: 1995     Quit date: 2023     Years since quittin.6     Passive exposure: Current    Smokeless tobacco: Never   Vaping Use    Vaping status: Never Used   Substance and Sexual Activity    Alcohol use: Never    Drug use: Not Currently    Sexual activity: Yes     Partners: Male           Objective   Physical Exam  Vitals and nursing note reviewed.   Constitutional:       General: She is not in acute distress.     Appearance: Normal appearance. She is well-developed. She is not diaphoretic.   HENT:      Head: Normocephalic and atraumatic.      Right Ear: External ear normal.      Left Ear: External ear normal.      Nose: Nose normal.      Mouth/Throat:      Mouth: Mucous membranes are moist.      Pharynx: No oropharyngeal exudate.   Eyes:      Extraocular Movements: Extraocular movements intact.      Conjunctiva/sclera: Conjunctivae normal.      Pupils: Pupils are equal, round, and reactive to light.   Cardiovascular:      Rate and Rhythm: Normal rate and regular rhythm.      Pulses: Normal pulses.      Heart sounds: Normal heart sounds.      Comments: S1, S2 audible.  Pulmonary:      Effort: Pulmonary effort is normal. No  respiratory distress.      Breath sounds: Normal breath sounds. No wheezing, rhonchi or rales.      Comments: On room air.  Abdominal:      General: Bowel sounds are normal. There is no distension.      Palpations: Abdomen is soft.      Tenderness: There is no abdominal tenderness. There is no guarding or rebound.   Musculoskeletal:         General: No tenderness or deformity. Normal range of motion.      Cervical back: Normal range of motion.   Skin:     General: Skin is warm.      Capillary Refill: Capillary refill takes less than 2 seconds.      Findings: No erythema or rash.   Neurological:      General: No focal deficit present.      Mental Status: She is alert and oriented to person, place, and time.      Cranial Nerves: No cranial nerve deficit.      Sensory: No sensory deficit.      Motor: No weakness.   Psychiatric:         Mood and Affect: Mood normal.         Behavior: Behavior normal.         Procedures           ED Course  ED Course as of 03/15/24 0429   Thu Mar 14, 2024   1927 Chest x-ray independently interpreted by myself shows no cardiomegaly, no acute pulmonary infiltrates.  Sternotomy present. [RL]   1927 EKG independently interpreted by myself shows sinus bradycardia rate 59 bpm, no ST elevation apparent, similar to previous study done on 8/13/2023 showed sinus rhythm 75 bpm, no ST elevation apparent.  Findings confirmed by Dr. Burns. [RL]   2050 Patient denies any exertional symptoms.  Patient also has a bruise where she is having pain of her arm.  Patient has somewhat reproducible pain on exam as well.  Patient has UTI here and will treat for UTI given her fatigue and not feeling well.  In shared decision-making with patient and spouse at bedside observation was offered from a cardiac standpoint cardiology consultation however patient is comfortable with discharge home and close follow-up and strict return precautions were discussed. [RL]      ED Course User Index  [RL] Drew Paul PA                                  Labs Reviewed   APTT - Abnormal; Notable for the following components:       Result Value    PTT 26.9 (*)     All other components within normal limits   CBC WITH AUTO DIFFERENTIAL - Abnormal; Notable for the following components:    Lymphocyte % 19.2 (*)     All other components within normal limits   URINALYSIS W/ CULTURE IF INDICATED - Abnormal; Notable for the following components:    Color, UA Dark Yellow (*)     Appearance, UA Cloudy (*)     Blood, UA Trace (*)     Leuk Esterase, UA Trace (*)     All other components within normal limits    Narrative:     In absence of clinical symptoms, the presence of pyuria, bacteria, and/or nitrites on the urinalysis result does not correlate with infection.   URINALYSIS, MICROSCOPIC ONLY - Abnormal; Notable for the following components:    RBC, UA 3-5 (*)     WBC, UA 6-10 (*)     Bacteria, UA 3+ (*)     Squamous Epithelial Cells, UA 7-12 (*)     All other components within normal limits   BASIC METABOLIC PANEL - Normal    Narrative:     GFR Normal >60  Chronic Kidney Disease <60  Kidney Failure <15     PROTIME-INR - Normal   TROPONIN - Normal    Narrative:     High Sensitive Troponin T Reference Range:  <14.0 ng/L- Negative Female for AMI  <22.0 ng/L- Negative Male for AMI  >=14 - Abnormal Female indicating possible myocardial injury.  >=22 - Abnormal Male indicating possible myocardial injury.   Clinicians would have to utilize clinical acumen, EKG, Troponin, and serial changes to determine if it is an Acute Myocardial Infarction or myocardial injury due to an underlying chronic condition.        BNP (IN-HOUSE) - Normal    Narrative:     This assay is used as an aid in the diagnosis of individuals suspected of having heart failure. It can be used as an aid in the diagnosis of acute decompensated heart failure (ADHF) in patients presenting with signs and symptoms of ADHF to the emergency department (ED). In addition, NT-proBNP of <300 pg/mL indicates  ADHF is not likely.    Age Range Result Interpretation  NT-proBNP Concentration (pg/mL:      <50             Positive            >450                   Gray                 300-450                    Negative             <300    50-75           Positive            >900                  Gray                300-900                  Negative            <300      >75             Positive            >1800                  Gray                300-1800                  Negative            <300   URINE CULTURE   HIGH SENSITIVITIY TROPONIN T 2HR    Narrative:     High Sensitive Troponin T Reference Range:  <14.0 ng/L- Negative Female for AMI  <22.0 ng/L- Negative Male for AMI  >=14 - Abnormal Female indicating possible myocardial injury.  >=22 - Abnormal Male indicating possible myocardial injury.   Clinicians would have to utilize clinical acumen, EKG, Troponin, and serial changes to determine if it is an Acute Myocardial Infarction or myocardial injury due to an underlying chronic condition.        CBC AND DIFFERENTIAL    Narrative:     The following orders were created for panel order CBC & Differential.  Procedure                               Abnormality         Status                     ---------                               -----------         ------                     CBC Auto Differential[[id number]]        Abnormal            Final result                 Please view results for these tests on the individual orders.   EXTRA TUBES    Narrative:     The following orders were created for panel order Extra Tubes.  Procedure                               Abnormality         Status                     ---------                               -----------         ------                     Gold Top - SST[[id number]]                                   Final result                 Please view results for these tests on the individual orders.   GOLD TOP - SST                 Medical Decision Making  Problems Addressed:  Acute  "UTI: complicated acute illness or injury  Pain of left upper extremity: complicated acute illness or injury    Amount and/or Complexity of Data Reviewed  Labs: ordered.  Radiology: ordered.    Risk  Prescription drug management.      Differential diagnosis: ACS, UTI, not meant to be an all inclusive list.  Chart review: Upon chart review, patient has history of CABG and follows with Dr. Holcomb.    EKG: See above    Imaging: If applicable see my interpretation above.  XR Chest 1 View   Final Result   Impression:   No evidence of acute cardiopulmonary disease.         Electronically Signed: Je Rodrigues MD     3/14/2024 6:03 PM EDT     Workstation ID: [id number]        Labs:  Lab work independently interpreted by myself shows initial and repeat troponin negative.  UA shows trace leukocyte esterase, 6-10 WBCs and 3+ bacteria however 7-12 squamous epithelials present.  Urine culture pending.  BNP normal.  Coags unremarkable.  CBC and BMP negative.    Vitals:  /78   Pulse 58   Temp 98 °F (36.7 °C) (Oral)   Resp 18   Ht 157.5 cm (62\")   Wt 86.2 kg (190 lb 0.6 oz)   LMP  (LMP Unknown)   SpO2 99%   BMI 34.76 kg/m²    Medications given:    Medications   cefTRIAXone (ROCEPHIN) 2,000 mg in sodium chloride 0.9 % 100 mL MBP (0 mg Intravenous Stopped 3/14/24 2122)       Procedures:  not indicated    MDM: Patient is a 63-year-old female comes in complaining of left arm pain and fatigue.  Patient denies any exertional symptoms. Lab work independently interpreted by myself shows initial and repeat troponin negative.  UA shows trace leukocyte esterase, 6-10 WBCs and 3+ bacteria however 7-12 squamous epithelials present.  Urine culture pending.  BNP normal.  Coags unremarkable.  CBC and BMP negative.  Patient does have some reproducible pain on my exam of left upper extremity and does have a small bruise dorsum aspect of left upper extremity.  Patient does not have risk factors for DVT at this time.  EKG shows no acute " findings.  In shared decision making with patient, I offered admission for further ACS rule out however patient does not have exertional symptoms and pain is nonspecific and reproducible at this time.  Patient denies any chest pain or shortness of breath.  I do not believe patient to have ACS at this time.  Patient would rather follow-up outpatient at this time after shared decision-making discussion.  Patient given Rocephin for UTI and sent home on a course of Augmentin for this.  I do believe patient's fatigue is secondary to UTI.  Patient family given strict return precautions and voiced understanding.  See full discharge instructions for further details. Plan discussed with patient and is agreeable with plan.      Final diagnoses:   Acute UTI   Pain of left upper extremity       ED Disposition  ED Disposition       ED Disposition   Discharge    Condition   Stable    Comment   --               [facility] EMERGENCY DEPARTMENT  [address] [address] [phone]  Go in 1 day  As needed, If symptoms worsen    Teetee Mckay MD  [address]  [place] [address] [place] [id number] [phone]    Schedule an appointment as soon as possible for a visit in 1 week  As needed    Royal Gonzales MD  [address] [facility] [place] [id number] [phone]    Schedule an appointment as soon as possible for a visit in 1 week  for cardiology follow up         Medication List        New Prescriptions      amoxicillin-clavulanate 875-125 MG per tablet  Commonly known as: AUGMENTIN  Take 1 tablet by mouth 2 (Two) Times a Day.               Where to Get Your Medications        These medications were sent to [facility] [place] [address] - [phone]  - [phone] FX  [address] [address]      Phone: [phone]   amoxicillin-clavulanate 875-125 MG per tablet            Drew Paul PA  03/15/24 0423

## 2024-03-14 NOTE — Clinical Note
[facility] EMERGENCY DEPARTMENT  [address] [facility] [place] [address]  Phone: [phone]    Sara Chaves was seen and treated in our emergency department on 3/14/2024.  She may return to work on 03/15/2024.         Thank you for choosing [facility].    Drew Paul PA

## 2024-03-15 LAB
QT INTERVAL: 427 MS
QTC INTERVAL: 423 MS

## 2024-03-15 NOTE — DISCHARGE INSTRUCTIONS
Please take antibiotic to completion even if feeling better.  Please come back to the ER if you have worsening pain or chest pain or pressure or shortness of breath especially if it is worse with exertion as you will need reevaluation at that time.  Please call your cardiologist in the morning to have close follow-up with them.  Please follow-up with your primary care provider in 1 week's time as well

## 2024-03-16 LAB — BACTERIA SPEC AEROBE CULT: NORMAL

## 2024-03-21 ENCOUNTER — OFFICE VISIT (OUTPATIENT)
Dept: FAMILY MEDICINE CLINIC | Facility: CLINIC | Age: 64
End: 2024-03-21
Payer: COMMERCIAL

## 2024-03-21 VITALS
HEART RATE: 61 BPM | DIASTOLIC BLOOD PRESSURE: 84 MMHG | HEIGHT: 62 IN | TEMPERATURE: 97.5 F | WEIGHT: 191 LBS | BODY MASS INDEX: 35.15 KG/M2 | OXYGEN SATURATION: 99 % | SYSTOLIC BLOOD PRESSURE: 128 MMHG

## 2024-03-21 DIAGNOSIS — B44.89: ICD-10-CM

## 2024-03-21 DIAGNOSIS — I25.110 CORONARY ARTERY DISEASE INVOLVING NATIVE CORONARY ARTERY OF NATIVE HEART WITH UNSTABLE ANGINA PECTORIS: ICD-10-CM

## 2024-03-21 DIAGNOSIS — F41.9 ANXIETY DISORDER, UNSPECIFIED TYPE: Primary | ICD-10-CM

## 2024-03-21 DIAGNOSIS — N30.01 ACUTE CYSTITIS WITH HEMATURIA: ICD-10-CM

## 2024-03-21 DIAGNOSIS — E66.09 CLASS 1 OBESITY DUE TO EXCESS CALORIES WITH SERIOUS COMORBIDITY AND BODY MASS INDEX (BMI) OF 34.0 TO 34.9 IN ADULT: ICD-10-CM

## 2024-03-21 PROBLEM — E66.811 CLASS 1 OBESITY DUE TO EXCESS CALORIES WITH SERIOUS COMORBIDITY AND BODY MASS INDEX (BMI) OF 34.0 TO 34.9 IN ADULT: Status: ACTIVE | Noted: 2024-03-21

## 2024-03-21 NOTE — PROGRESS NOTES
Sara Chaves is a 63 y.o. female.     History of Present Illness  63-year-old white female patient of Dr. Mckay who had a ER visit at [facility] for urinary tract infection.  While there since she just had CABG not long ago and did not feel well, they did a cardiac workup that was negative.  She comes in today for follow-up visit from that ER visit.    Blood pressure 128/84 heart rate 60 she denies any chest pain, dyspnea, tachycardia or dizziness.  She does have upcoming appointment with cardiology in May    Patient finishes her last antibiotic today she has been drinking a lot of water trying to flush out her bladder.  Her only symptom right now is a yeast infection.  I will order her Diflucan told her to come back in 1 week for a follow-up urine to make sure infection is gone    Patient currently on Paxil for anxiety but states anxiety has been a little worse since her heart attack.  She has been on vacation this week and states anxiety was a lot less.  She plans on retiring 1 May.  She has been on Paxil for a very long time.  We discussed caffeine intake since she does drink a lot of diet Cokes and I informed her about the sodium and chemicals in the cold products that are harmful for her.  Patient is going to eliminate the soft drinks just had the coffee in the morning.  She is going to retire and with the limited caffeine see how her anxiety goes and will follow-up if it still a problem.  I told her we could do GeneSight testing if it is necessary for us to address her anxiety after [facility]    Weight is 191 with a BMI of 34.9.              Diflucan 150 x 1  Follow-up urine in 1 week  Stop drinking soft drinks and excessive caffeine  Keep appointment with cardiology  Follow-up after MCFP to see if anxiety is still an issue       The following portions of the patient's history were reviewed and updated as appropriate: allergies, current medications, past family history, past medical history, past  "social history, past surgical history, and problem list.    Vitals:    03/21/24 1114   BP: 128/84   BP Location: Right arm   Patient Position: Sitting   Cuff Size: Adult   Pulse: 61   Temp: 97.5 °F (36.4 °C)   TempSrc: Infrared   SpO2: 99%   Weight: 86.6 kg (191 lb)   Height: 157.5 cm (62.01\")     Body mass index is 34.93 kg/m².    Past Medical History:   Diagnosis Date    Coronary artery disease     Hyperlipidemia     Hypertension      Past Surgical History:   Procedure Laterality Date    CARDIAC SURGERY      CORONARY ARTERY BYPASS GRAFT N/A 8/11/2023    Procedure: CORONARY ARTERY BYPASS GRAFTING;  Surgeon: Noe Clements MD;  Location: [facility];  Service: Cardiothoracic;  Laterality: N/A;  CABG X4 including HOPPER    KNEE ACL RECONSTRUCTION Right      Family History   Problem Relation Age of Onset    Hypertension Mother     Breast cancer Mother     Hypertension Father        There is no immunization history on file for this patient.    Admission on 03/14/2024, Discharged on 03/14/2024   Component Date Value Ref Range Status    QT Interval 03/14/2024 453  ms Preliminary    QTC Interval 03/14/2024 446  ms Preliminary    Glucose 03/14/2024 87  65 - 99 mg/dL Final    BUN 03/14/2024 11  8 - 23 mg/dL Final    Creatinine 03/14/2024 0.63  0.57 - 1.00 mg/dL Final    Sodium 03/14/2024 144  136 - 145 mmol/L Final    Potassium 03/14/2024 3.9  3.5 - 5.2 mmol/L Final    Chloride 03/14/2024 107  98 - 107 mmol/L Final    CO2 03/14/2024 26.0  22.0 - 29.0 mmol/L Final    Calcium 03/14/2024 10.0  8.6 - 10.5 mg/dL Final    BUN/Creatinine Ratio 03/14/2024 17.5  7.0 - 25.0 Final    Anion Gap 03/14/2024 11.0  5.0 - 15.0 mmol/L Final    eGFR 03/14/2024 101.1  >60.0 mL/min/1.73 Final    Protime 03/14/2024 10.6  9.6 - 11.7 Seconds Final    INR 03/14/2024 0.97  0.93 - 1.10 Final    PTT 03/14/2024 26.9 (L)  61.0 - 76.5 seconds Final    HS Troponin T 03/14/2024 <6  <14 ng/L Final    proBNP 03/14/2024 70.3  0.0 - 900.0 pg/mL Final    " WBC 03/14/2024 9.52  3.40 - 10.80 10*3/mm3 Final    RBC 03/14/2024 5.08  3.77 - 5.28 10*6/mm3 Final    Hemoglobin 03/14/2024 14.9  12.0 - 15.9 g/dL Final    Hematocrit 03/14/2024 45.9  34.0 - 46.6 % Final    MCV 03/14/2024 90.4  79.0 - 97.0 fL Final    MCH 03/14/2024 29.3  26.6 - 33.0 pg Final    MCHC 03/14/2024 32.5  31.5 - 35.7 g/dL Final    RDW 03/14/2024 13.0  12.3 - 15.4 % Final    RDW-SD 03/14/2024 43.3  37.0 - 54.0 fl Final    MPV 03/14/2024 9.8  6.0 - 12.0 fL Final    Platelets 03/14/2024 244  140 - 450 10*3/mm3 Final    Neutrophil % 03/14/2024 70.8  42.7 - 76.0 % Final    Lymphocyte % 03/14/2024 19.2 (L)  19.6 - 45.3 % Final    Monocyte % 03/14/2024 6.2  5.0 - 12.0 % Final    Eosinophil % 03/14/2024 2.2  0.3 - 6.2 % Final    Basophil % 03/14/2024 1.2  0.0 - 1.5 % Final    Immature Grans % 03/14/2024 0.4  0.0 - 0.5 % Final    Neutrophils, Absolute 03/14/2024 6.74  1.70 - 7.00 10*3/mm3 Final    Lymphocytes, Absolute 03/14/2024 1.83  0.70 - 3.10 10*3/mm3 Final    Monocytes, Absolute 03/14/2024 0.59  0.10 - 0.90 10*3/mm3 Final    Eosinophils, Absolute 03/14/2024 0.21  0.00 - 0.40 10*3/mm3 Final    Basophils, Absolute 03/14/2024 0.11  0.00 - 0.20 10*3/mm3 Final    Immature Grans, Absolute 03/14/2024 0.04  0.00 - 0.05 10*3/mm3 Final    nRBC 03/14/2024 0.0  0.0 - 0.2 /100 WBC Final    Extra Tube 03/14/2024 Hold for add-ons.   Final    Auto resulted.    HS Troponin T 03/14/2024 <6  <14 ng/L Final    Troponin T Delta 03/14/2024    Final    Unable to calculate.    Color, UA 03/14/2024 Dark Yellow (A)  Yellow, Straw Final    Appearance, UA 03/14/2024 Cloudy (A)  Clear Final    pH, UA 03/14/2024 <=5.0  5.0 - 8.0 Final    Specific Gravity, UA 03/14/2024 1.026  1.005 - 1.030 Final    Glucose, UA 03/14/2024 Negative  Negative Final    Ketones, UA 03/14/2024 Negative  Negative Final    Bilirubin, UA 03/14/2024 Negative  Negative Final    Blood, UA 03/14/2024 Trace (A)  Negative Final    Protein, UA 03/14/2024 Negative   Negative Final    Leuk Esterase, UA 03/14/2024 Trace (A)  Negative Final    Nitrite, UA 03/14/2024 Negative  Negative Final    Urobilinogen, UA 03/14/2024 1.0 E.U./dL  0.2 - 1.0 E.U./dL Final    RBC, UA 03/14/2024 3-5 (A)  None Seen, 0-2 /HPF Final    WBC, UA 03/14/2024 6-10 (A)  None Seen, 0-2 /HPF Final    Bacteria, UA 03/14/2024 3+ (A)  None Seen /HPF Final    Squamous Epithelial Cells, UA 03/14/2024 7-12 (A)  None Seen, 0-2 /HPF Final    Hyaline Casts, UA 03/14/2024 0-2  None Seen /LPF Final    Methodology 03/14/2024 Automated Microscopy   Final    Urine Culture 03/14/2024 25,000 CFU/mL Normal Urogenital Karma   Final    QT Interval 03/14/2024 427  ms Final    QTC Interval 03/14/2024 423  ms Final         Review of Systems   Constitutional: Negative.    HENT: Negative.     Respiratory: Negative.     Cardiovascular: Negative.    Gastrointestinal: Negative.    Genitourinary: Negative.         Yeast infection   Musculoskeletal: Negative.    Skin: Negative.    Neurological: Negative.    Psychiatric/Behavioral:  The patient is nervous/anxious.        Objective   Physical Exam  Constitutional:       Appearance: Normal appearance.   HENT:      Head: Normocephalic.   Cardiovascular:      Rate and Rhythm: Normal rate and regular rhythm.      Pulses: Normal pulses.      Heart sounds: Normal heart sounds.   Pulmonary:      Effort: Pulmonary effort is normal.      Breath sounds: Normal breath sounds.   Abdominal:      General: Bowel sounds are normal.   Musculoskeletal:         General: Normal range of motion.   Skin:     General: Skin is warm.   Neurological:      General: No focal deficit present.      Mental Status: She is alert and oriented to person, place, and time.   Psychiatric:         Mood and Affect: Mood normal.         Behavior: Behavior normal.         Procedures    Assessment & Plan   Diagnoses and all orders for this visit:    1. Anxiety disorder, unspecified type (Primary)    2. Coronary artery disease  involving native coronary artery of native heart with unstable angina pectoris    3. Class 1 obesity due to excess calories with serious comorbidity and body mass index (BMI) of 34.0 to 34.9 in adult    4. Acute cystitis with hematuria    5. 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           Current Outpatient Medications:     amLODIPine (NORVASC) 5 MG tablet, Take 1 tablet by mouth Daily., Disp: 90 tablet, Rfl: 3    ASPIRIN 81 PO, Take 81 mg by mouth Daily., Disp: , Rfl:     clopidogrel (PLAVIX) 75 MG tablet, Take 1 tablet by mouth Daily., Disp: 90 tablet, Rfl: 3    ezetimibe (ZETIA) 10 MG tablet, Take 1 tablet by mouth Daily., Disp: 90 tablet, Rfl: 3    losartan (Cozaar) 100 MG tablet, Take 1 tablet by mouth Daily., Disp: 90 tablet, Rfl: 3    metoprolol tartrate (LOPRESSOR) 25 MG tablet, Take 0.5 tablets by mouth Every 12 (Twelve) Hours., Disp: 90 tablet, Rfl: 3    Omega-3 Fatty Acids (fish oil) 1000 MG capsule capsule, Take 1 capsule by mouth Every Night., Disp: , Rfl:     PARoxetine (PAXIL) 20 MG tablet, Take 1 tablet by mouth Daily., Disp: , Rfl:     pravastatin (PRAVACHOL) 80 MG tablet, Take 1 tablet by mouth Daily., Disp: 90 tablet, Rfl: 3           CELY Junior 3/21/2024 11:40 EDT  This note has been electronically signed

## 2024-03-22 ENCOUNTER — TELEPHONE (OUTPATIENT)
Dept: FAMILY MEDICINE CLINIC | Facility: CLINIC | Age: 64
End: 2024-03-22
Payer: COMMERCIAL

## 2024-03-22 RX ORDER — FLUCONAZOLE 150 MG/1
150 TABLET ORAL ONCE
Qty: 1 TABLET | Refills: 0 | Status: SHIPPED | OUTPATIENT
Start: 2024-03-22 | End: 2024-03-22

## 2024-03-22 NOTE — TELEPHONE ENCOUNTER
Please call Sara and tell her that I have reviewed Linda's note.  It looks like she intended to send the Diflucan, but I could not find a record of it.  I have just now sent a prescription for that to [facility] pharmacy in [place].  Thanks!

## 2024-03-22 NOTE — TELEPHONE ENCOUNTER
Caller: AndieSara    Relationship: Self    Best call back number: [phone]    What medication are you requesting: YEAST INFECTION MEDICATION     What are your current symptoms: N/A    How long have you been experiencing symptoms: N/A    Have you had these symptoms before:    [x] Yes  [] No    Have you been treated for these symptoms before:   [x] Yes  [] No    If a prescription is needed, what is your preferred pharmacy and phone number: [facility]. [facility] [place] [address] - [phone]  - [phone] FX     Additional notes:    PATIENT CALLED AND SAID SHE SAW MARCO ANTONIO RUDOLPH YESTERDAY AND WAS TOLD SOMETHING WOULD BE PRESCRIBED FOR A YEAST INFECTION BUT SHE SAID THE PHARMACY HAD NOT RECEIVED ANYTHING YET

## 2024-03-28 ENCOUNTER — TELEPHONE (OUTPATIENT)
Dept: FAMILY MEDICINE CLINIC | Facility: CLINIC | Age: 64
End: 2024-03-28
Payer: COMMERCIAL

## 2024-03-28 DIAGNOSIS — N30.00 ACUTE CYSTITIS WITHOUT HEMATURIA: Primary | ICD-10-CM

## 2024-03-28 NOTE — TELEPHONE ENCOUNTER
You told this pt to come back for a repeat culture after abx.  She is coming tomorrow.  Could you please put an order in for her to have a urine culture?

## 2024-05-08 DIAGNOSIS — F41.1 GENERALIZED ANXIETY DISORDER: ICD-10-CM

## 2024-05-09 RX ORDER — CLOPIDOGREL BISULFATE 75 MG/1
75 TABLET ORAL DAILY
Qty: 90 TABLET | Refills: 3 | Status: SHIPPED | OUTPATIENT
Start: 2024-05-09

## 2024-05-09 RX ORDER — PAROXETINE HYDROCHLORIDE 20 MG/1
20 TABLET, FILM COATED ORAL DAILY
Qty: 90 TABLET | Refills: 3 | Status: SHIPPED | OUTPATIENT
Start: 2024-05-09

## 2024-05-09 RX ORDER — AMLODIPINE BESYLATE 5 MG/1
5 TABLET ORAL DAILY
Qty: 90 TABLET | Refills: 3 | Status: SHIPPED | OUTPATIENT
Start: 2024-05-09

## 2024-05-09 RX ORDER — PRAVASTATIN SODIUM 80 MG/1
80 TABLET ORAL DAILY
Qty: 90 TABLET | Refills: 3 | Status: SHIPPED | OUTPATIENT
Start: 2024-05-09

## 2024-05-09 RX ORDER — LOSARTAN POTASSIUM 100 MG/1
100 TABLET ORAL DAILY
Qty: 90 TABLET | Refills: 3 | Status: SHIPPED | OUTPATIENT
Start: 2024-05-09

## 2024-05-09 RX ORDER — EZETIMIBE 10 MG/1
10 TABLET ORAL DAILY
Qty: 90 TABLET | Refills: 3 | Status: SHIPPED | OUTPATIENT
Start: 2024-05-09

## 2024-06-12 ENCOUNTER — OFFICE VISIT (OUTPATIENT)
Dept: FAMILY MEDICINE CLINIC | Facility: CLINIC | Age: 64
End: 2024-06-12
Payer: COMMERCIAL

## 2024-06-12 VITALS
RESPIRATION RATE: 18 BRPM | HEART RATE: 66 BPM | DIASTOLIC BLOOD PRESSURE: 82 MMHG | HEIGHT: 62 IN | WEIGHT: 190.4 LBS | OXYGEN SATURATION: 93 % | TEMPERATURE: 97.5 F | SYSTOLIC BLOOD PRESSURE: 120 MMHG | BODY MASS INDEX: 35.04 KG/M2

## 2024-06-12 DIAGNOSIS — Z87.891 PERSONAL HISTORY OF NICOTINE DEPENDENCE: ICD-10-CM

## 2024-06-12 DIAGNOSIS — Z95.1 S/P CABG X 4: ICD-10-CM

## 2024-06-12 DIAGNOSIS — I25.110 CORONARY ARTERY DISEASE INVOLVING NATIVE CORONARY ARTERY OF NATIVE HEART WITH UNSTABLE ANGINA PECTORIS: ICD-10-CM

## 2024-06-12 DIAGNOSIS — I10 BENIGN HYPERTENSION: Primary | ICD-10-CM

## 2024-06-12 DIAGNOSIS — F41.9 ANXIETY DISORDER, UNSPECIFIED TYPE: ICD-10-CM

## 2024-06-12 DIAGNOSIS — E78.2 MIXED HYPERLIPIDEMIA: ICD-10-CM

## 2024-06-12 PROCEDURE — 99214 OFFICE O/P EST MOD 30 MIN: CPT | Performed by: FAMILY MEDICINE

## 2024-06-12 RX ORDER — VARENICLINE TARTRATE 1 MG/1
1 TABLET, FILM COATED ORAL 2 TIMES DAILY
Qty: 60 TABLET | Refills: 1 | Status: SHIPPED | OUTPATIENT
Start: 2024-06-12

## 2024-06-12 RX ORDER — VARENICLINE TARTRATE 0.5 MG/1
TABLET, FILM COATED ORAL
Qty: 11 TABLET | Refills: 0 | Status: SHIPPED | OUTPATIENT
Start: 2024-06-12

## 2024-06-12 NOTE — PROGRESS NOTES
Subjective   Sara Chaves is a 63 y.o. female.   Chief Complaint   Patient presents with    Hypertension    Hyperlipidemia    Anxiety       History of Present Illness   63 y.o. female with PMH of coronary artery disease, MI, CABG last summer, HLD presents the office today for follow-up.  I met her in August of last year.  She was having some issues and had just had open heart surgery.  I wanted to see her again in 6 weeks.  She canceled that appointment.  Saw Linda for a UTI in March of this year.  Refill request continued.  I asked her to come in.    Reports that she really has been doing fine.  No chest pains.  No shortness of breath.  Has returned to normal activities.  Has continued to follow-up with her cardiologist.  Will see him again in December of this year.  Denies any problems with medications.  No side effects.  She does have a history of anxiety and has been on Paxil for years.  Continues to do well regarding that.  She has had labs within the last few months.  Ordered by Dr. Keane.  Blood sugar was normal.  Cholesterol levels were excellent.      Patient Active Problem List    Diagnosis Date Noted    Class 1 obesity due to excess calories with serious comorbidity and body mass index (BMI) of 34.0 to 34.9 in adult 03/21/2024    Anxiety disorder 08/22/2023     Note Last Updated: 8/22/2023     On Paxil for years - helps      Hyperlipidemia 08/22/2023    Benign hypertension 08/22/2023    Acute congestive heart failure with left ventricular diastolic dysfunction 08/22/2023    S/P CABG x 4 08/22/2023     Note Last Updated: 8/22/2023 8/11/23      Coronary artery disease involving native coronary artery of native heart with unstable angina pectoris 08/09/2023     Note Last Updated: 8/9/2023     Added automatically from request for surgery [id number]      NSTEMI (non-ST elevated myocardial infarction) 08/08/2023           Past Surgical History:   Procedure Laterality Date    CARDIAC SURGERY      CORONARY  ARTERY BYPASS GRAFT N/A 8/11/2023    Procedure: CORONARY ARTERY BYPASS GRAFTING;  Surgeon: Noe Clements MD;  Location: [facility];  Service: Cardiothoracic;  Laterality: N/A;  CABG X4 including HOPPER    KNEE ACL RECONSTRUCTION Right      Current Outpatient Medications on File Prior to Visit   Medication Sig    amLODIPine (NORVASC) 5 MG tablet TAKE ONE TABLET BY MOUTH DAILY    ASPIRIN 81 PO Take 81 mg by mouth Daily.    clopidogrel (PLAVIX) 75 MG tablet TAKE ONE TABLET BY MOUTH DAILY    ezetimibe (ZETIA) 10 MG tablet TAKE ONE TABLET BY MOUTH DAILY    losartan (COZAAR) 100 MG tablet TAKE ONE TABLET BY MOUTH DAILY    metoprolol tartrate (LOPRESSOR) 25 MG tablet TAKE 1/2 TABLET BY MOUTH EVERY 12 HOURS    Omega-3 Fatty Acids (fish oil) 1000 MG capsule capsule Take 1 capsule by mouth Every Night.    PARoxetine (PAXIL) 20 MG tablet TAKE ONE TABLET BY MOUTH DAILY    pravastatin (PRAVACHOL) 80 MG tablet TAKE ONE TABLET BY MOUTH DAILY     No current facility-administered medications on file prior to visit.     No Known Allergies  Social History     Socioeconomic History    Marital status:    Tobacco Use    Smoking status: Every Day     Current packs/day: 0.25     Average packs/day: 1 pack/day for 28.8 years (28.2 ttl pk-yrs)     Types: Cigarettes     Start date: 8/18/1995     Last attempt to quit: 8/6/2023     Passive exposure: Current    Smokeless tobacco: Never   Vaping Use    Vaping status: Never Used   Substance and Sexual Activity    Alcohol use: Never    Drug use: Never    Sexual activity: Yes     Partners: Male     Birth control/protection: Post-menopausal, Vasectomy     Family History   Problem Relation Age of Onset    Hypertension Mother     Breast cancer Mother     Hypertension Father     Heart disease Father        Review of Systems    Objective   /82 (BP Location: Right arm, Patient Position: Sitting, Cuff Size: Large Adult)   Pulse 66   Temp 97.5 °F (36.4 °C) (Infrared)   Resp 18   Ht  "157.5 cm (62.01\")   Wt 86.4 kg (190 lb 6.4 oz)   LMP  (LMP Unknown)   SpO2 93%   Breastfeeding No   BMI 34.81 kg/m²   Physical Exam  Constitutional:       General: She is not in acute distress.     Appearance: Normal appearance. She is well-developed.      Comments:      HENT:      Head: Normocephalic and atraumatic.   Eyes:      Conjunctiva/sclera: Conjunctivae normal.   Cardiovascular:      Rate and Rhythm: Normal rate.   Pulmonary:      Effort: Pulmonary effort is normal. No respiratory distress.   Musculoskeletal:         General: Normal range of motion.      Cervical back: Normal range of motion.      Right lower leg: No edema.      Left lower leg: No edema.   Skin:     General: Skin is warm and dry.      Findings: No rash.   Neurological:      Mental Status: She is alert and oriented to person, place, and time.      Gait: Gait normal.   Psychiatric:         Mood and Affect: Mood normal.         Behavior: Behavior normal.           Results Encounter on 03/28/2024   Component Date Value Ref Range Status    Urine Culture 03/29/2024 Final report   Final    Result 1 03/29/2024 No growth   Final   Admission on 03/14/2024, Discharged on 03/14/2024   Component Date Value Ref Range Status    QT Interval 03/14/2024 453  ms Preliminary    QTC Interval 03/14/2024 446  ms Preliminary    Glucose 03/14/2024 87  65 - 99 mg/dL Final    BUN 03/14/2024 11  8 - 23 mg/dL Final    Creatinine 03/14/2024 0.63  0.57 - 1.00 mg/dL Final    Sodium 03/14/2024 144  136 - 145 mmol/L Final    Potassium 03/14/2024 3.9  3.5 - 5.2 mmol/L Final    Chloride 03/14/2024 107  98 - 107 mmol/L Final    CO2 03/14/2024 26.0  22.0 - 29.0 mmol/L Final    Calcium 03/14/2024 10.0  8.6 - 10.5 mg/dL Final    BUN/Creatinine Ratio 03/14/2024 17.5  7.0 - 25.0 Final    Anion Gap 03/14/2024 11.0  5.0 - 15.0 mmol/L Final    eGFR 03/14/2024 101.1  >60.0 mL/min/1.73 Final    Protime 03/14/2024 10.6  9.6 - 11.7 Seconds Final    INR 03/14/2024 0.97  0.93 - 1.10 " Final    PTT 03/14/2024 26.9 (L)  61.0 - 76.5 seconds Final    HS Troponin T 03/14/2024 <6  <14 ng/L Final    proBNP 03/14/2024 70.3  0.0 - 900.0 pg/mL Final    WBC 03/14/2024 9.52  3.40 - 10.80 10*3/mm3 Final    RBC 03/14/2024 5.08  3.77 - 5.28 10*6/mm3 Final    Hemoglobin 03/14/2024 14.9  12.0 - 15.9 g/dL Final    Hematocrit 03/14/2024 45.9  34.0 - 46.6 % Final    MCV 03/14/2024 90.4  79.0 - 97.0 fL Final    MCH 03/14/2024 29.3  26.6 - 33.0 pg Final    MCHC 03/14/2024 32.5  31.5 - 35.7 g/dL Final    RDW 03/14/2024 13.0  12.3 - 15.4 % Final    RDW-SD 03/14/2024 43.3  37.0 - 54.0 fl Final    MPV 03/14/2024 9.8  6.0 - 12.0 fL Final    Platelets 03/14/2024 244  140 - 450 10*3/mm3 Final    Neutrophil % 03/14/2024 70.8  42.7 - 76.0 % Final    Lymphocyte % 03/14/2024 19.2 (L)  19.6 - 45.3 % Final    Monocyte % 03/14/2024 6.2  5.0 - 12.0 % Final    Eosinophil % 03/14/2024 2.2  0.3 - 6.2 % Final    Basophil % 03/14/2024 1.2  0.0 - 1.5 % Final    Immature Grans % 03/14/2024 0.4  0.0 - 0.5 % Final    Neutrophils, Absolute 03/14/2024 6.74  1.70 - 7.00 10*3/mm3 Final    Lymphocytes, Absolute 03/14/2024 1.83  0.70 - 3.10 10*3/mm3 Final    Monocytes, Absolute 03/14/2024 0.59  0.10 - 0.90 10*3/mm3 Final    Eosinophils, Absolute 03/14/2024 0.21  0.00 - 0.40 10*3/mm3 Final    Basophils, Absolute 03/14/2024 0.11  0.00 - 0.20 10*3/mm3 Final    Immature Grans, Absolute 03/14/2024 0.04  0.00 - 0.05 10*3/mm3 Final    nRBC 03/14/2024 0.0  0.0 - 0.2 /100 WBC Final    Extra Tube 03/14/2024 Hold for add-ons.   Final    Auto resulted.    HS Troponin T 03/14/2024 <6  <14 ng/L Final    Troponin T Delta 03/14/2024    Final    Unable to calculate.    Color, UA 03/14/2024 Dark Yellow (A)  Yellow, Straw Final    Appearance, UA 03/14/2024 Cloudy (A)  Clear Final    pH, UA 03/14/2024 <=5.0  5.0 - 8.0 Final    Specific Gravity, UA 03/14/2024 1.026  1.005 - 1.030 Final    Glucose, UA 03/14/2024 Negative  Negative Final    Ketones, UA 03/14/2024  Negative  Negative Final    Bilirubin, UA 03/14/2024 Negative  Negative Final    Blood, UA 03/14/2024 Trace (A)  Negative Final    Protein, UA 03/14/2024 Negative  Negative Final    Leuk Esterase, UA 03/14/2024 Trace (A)  Negative Final    Nitrite, UA 03/14/2024 Negative  Negative Final    Urobilinogen, UA 03/14/2024 1.0 E.U./dL  0.2 - 1.0 E.U./dL Final    RBC, UA 03/14/2024 3-5 (A)  None Seen, 0-2 /HPF Final    WBC, UA 03/14/2024 6-10 (A)  None Seen, 0-2 /HPF Final    Bacteria, UA 03/14/2024 3+ (A)  None Seen /HPF Final    Squamous Epithelial Cells, UA 03/14/2024 7-12 (A)  None Seen, 0-2 /HPF Final    Hyaline Casts, UA 03/14/2024 0-2  None Seen /LPF Final    Methodology 03/14/2024 Automated Microscopy   Final    Urine Culture 03/14/2024 25,000 CFU/mL Normal Urogenital Karma   Final    QT Interval 03/14/2024 427  ms Final    QTC Interval 03/14/2024 423  ms Final       BMI is >= 30 and <35. (Class 1 Obesity). The following options were offered after discussion;: exercise counseling/recommendations and nutrition counseling/recommendations     Assessment & Plan   Diagnoses and all orders for this visit:    1. Benign hypertension (Primary)    2. Personal history of nicotine dependence  -     varenicline (Chantix) 0.5 MG tablet; 1 tablet by mouth x 3 days, then 1 tablet twice daily x 4 days, then go to [facility] dose  Dispense: 11 tablet; Refill: 0  -     varenicline (Chantix Continuing Month Pak) 1 MG tablet; Take 1 tablet by mouth 2 (Two) Times a Day. After completing starting taper  Dispense: 60 tablet; Refill: 1    3. Coronary artery disease involving native coronary artery of native heart with unstable angina pectoris    4. S/P CABG x 4    5. Mixed hyperlipidemia    6. Anxiety disorder, unspecified type    Status of multiple chronic medical problems reviewed today as above.  Hypertension is under good control.  Continue amlodipine, losartan, metoprolol at current doses.  Continue pravastatin 80 mg/day.  Lipid panel a  month or so ago was excellent.  She does not have diabetes.  She is asymptomatic with regard to her coronary disease.  Fully recovered from her CABG.  Anxiety is under good control.  She wonders about trying to stop Paxil since she has been on it for so long.    She thinks it might be time to stop smoking.  We talked about secondary risk factors for coronary artery disease.  Since her blood pressure, cholesterol are controlled, she does not have diabetes, smoking is last big risk.  We talked about options but have settled on Chantix.  Prescription sent in as above.  Discussed its appropriate use.  Follow-up here in 3 months.  Hopefully she will be tobacco free.  We can then talk about reducing or stopping the Paxil.  I advised her against doing that while trying to stop smoking.        Call with any problems or concerns before next visit       Return in about 3 months (around 9/12/2024).      Much of this report is an electronic transcription of spoken language to printed text using Dragon dictation software.  As such, the subtleties and finesse of spoken language may permit erroneous, or at times, nonsensical words or phrases to be inadvertently transcribed; thus changes may be made at a later date to rectify these errors.     Teetee Mckay MD6/13/202409:46 EDT  This note has been electronically signed

## 2024-06-19 DIAGNOSIS — Z87.891 PERSONAL HISTORY OF NICOTINE DEPENDENCE: ICD-10-CM

## 2024-06-19 RX ORDER — VARENICLINE TARTRATE 0.5 MG/1
TABLET, FILM COATED ORAL
Qty: 11 TABLET | Refills: 0 | Status: SHIPPED | OUTPATIENT
Start: 2024-06-19

## 2024-07-27 DIAGNOSIS — Z87.891 PERSONAL HISTORY OF NICOTINE DEPENDENCE: ICD-10-CM

## 2024-07-29 RX ORDER — VARENICLINE TARTRATE 1 MG/1
TABLET, FILM COATED ORAL
Qty: 60 TABLET | Refills: 1 | Status: SHIPPED | OUTPATIENT
Start: 2024-07-29

## 2024-08-06 DIAGNOSIS — F41.1 GENERALIZED ANXIETY DISORDER: ICD-10-CM

## 2024-08-07 RX ORDER — PAROXETINE HYDROCHLORIDE 20 MG/1
20 TABLET, FILM COATED ORAL DAILY
Qty: 90 TABLET | Refills: 3 | Status: SHIPPED | OUTPATIENT
Start: 2024-08-07

## 2024-08-26 ENCOUNTER — OFFICE VISIT (OUTPATIENT)
Dept: SLEEP MEDICINE | Facility: CLINIC | Age: 64
End: 2024-08-26
Payer: COMMERCIAL

## 2024-08-26 VITALS
DIASTOLIC BLOOD PRESSURE: 78 MMHG | WEIGHT: 196.4 LBS | HEART RATE: 62 BPM | BODY MASS INDEX: 36.14 KG/M2 | SYSTOLIC BLOOD PRESSURE: 128 MMHG | OXYGEN SATURATION: 96 % | HEIGHT: 62 IN

## 2024-08-26 DIAGNOSIS — G47.8 NON-RESTORATIVE SLEEP: ICD-10-CM

## 2024-08-26 DIAGNOSIS — G47.00 INSOMNIA, UNSPECIFIED TYPE: ICD-10-CM

## 2024-08-26 DIAGNOSIS — E66.9 OBESITY (BMI 30-39.9): ICD-10-CM

## 2024-08-26 DIAGNOSIS — G47.10 HYPERSOMNIA: ICD-10-CM

## 2024-08-26 DIAGNOSIS — I21.4 NSTEMI (NON-ST ELEVATED MYOCARDIAL INFARCTION): ICD-10-CM

## 2024-08-26 DIAGNOSIS — G47.30 SLEEP APNEA, UNSPECIFIED TYPE: Primary | ICD-10-CM

## 2024-08-26 PROCEDURE — G0463 HOSPITAL OUTPT CLINIC VISIT: HCPCS

## 2024-08-26 PROCEDURE — 99214 OFFICE O/P EST MOD 30 MIN: CPT | Performed by: FAMILY MEDICINE

## 2024-08-26 RX ORDER — ZOLPIDEM TARTRATE 5 MG/1
TABLET ORAL
Qty: 1 TABLET | Refills: 0 | Status: SHIPPED | OUTPATIENT
Start: 2024-08-26

## 2024-08-26 NOTE — PROGRESS NOTES
"[facility]ultation       Reason for Consultation: EZE      Patient Care Team:  Teetee Mckay MD as PCP - General (Family Medicine)  Jacob Cavazos MD as Consulting Physician (Cardiology)  Fady Fernandez MD as Consulting Physician (Sleep Medicine)      History of present illness:  Thank you for asking me to see your patient.  The patient is a 64 y.o. female with coronary artery disease, history of MI, CABG 2023, HLD presents today referred from cardiology for concern for sleep disorder.  No history of prior sleep study or tonsillectomy.  Sleep latency can be \"a while\" sleep 6 to 7 hours 1 nap per day no rotating shifts.  Reports hypersomnia nonrestorative sleep weight gain over the past 5 years difficulty falling asleep at night.  BMI 35.9.    Medical Conditions (PMH):   CAD  MI  CABG  Hyperlipidemia  Anxiety    Social history:  Do you drive a commercial vehicle:  No   Shift work:  No   Tobacco use:  Yes 7 to 10 cigarettes a day  Alcohol use: No per week  Caffeinated drinks: 3-4 per day  Occupation: Unanswered    Family History (parents and siblings) (pertaining to sleep medicine):  Heart disease  Hypertension    Allergies:  Patient has no known allergies.       Current Outpatient Medications:     amLODIPine (NORVASC) 5 MG tablet, TAKE ONE TABLET BY MOUTH DAILY, Disp: 90 tablet, Rfl: 3    ASPIRIN 81 PO, Take 81 mg by mouth Daily., Disp: , Rfl:     clopidogrel (PLAVIX) 75 MG tablet, TAKE ONE TABLET BY MOUTH DAILY, Disp: 90 tablet, Rfl: 3    ezetimibe (ZETIA) 10 MG tablet, TAKE ONE TABLET BY MOUTH DAILY, Disp: 90 tablet, Rfl: 3    losartan (COZAAR) 100 MG tablet, TAKE ONE TABLET BY MOUTH DAILY, Disp: 90 tablet, Rfl: 3    metoprolol tartrate (LOPRESSOR) 25 MG tablet, TAKE 1/2 TABLET BY MOUTH EVERY 12 HOURS, Disp: 90 tablet, Rfl: 3    Omega-3 Fatty Acids (fish oil) 1000 MG capsule capsule, Take 1 capsule by mouth Every Night., Disp: , Rfl:     PARoxetine (PAXIL) 20 MG " "tablet, TAKE ONE TABLET BY MOUTH DAILY, Disp: 90 tablet, Rfl: 3    pravastatin (PRAVACHOL) 80 MG tablet, TAKE ONE TABLET BY MOUTH DAILY, Disp: 90 tablet, Rfl: 3    varenicline (CHANTIX) 0.5 MG tablet, TAKE ONE TABLET BY MOUTH ONCE DAILY FOR 3 DAYS, THEN TAKE ONE TABLET TWICE DAILY FOR 4 DAYS, THEN GO TO [facility]INT[facility] DOSE, Disp: 11 tablet, Rfl: 0    varenicline (CHANTIX) 1 MG tablet, TAKE ONE TABLET BY MOUTH TWICE DAILY AFTER COMPLETING STARTING TAPER, Disp: 60 tablet, Rfl: 1    zolpidem (Ambien) 5 MG tablet, Take 1/2 tab as needed for sleep at night of sleep study only. Do not use at home., Disp: 1 tablet, Rfl: 0    Vital Signs:    Vitals:    08/26/24 1100   BP: 128/78   BP Location: Left arm   Patient Position: Sitting   Pulse: 62   SpO2: 96%   Weight: 89.1 kg (196 lb 6.4 oz)   Height: 157.5 cm (62\")      Body mass index is 35.92 kg/m².  Neck Circumference: 15 inches      REVIEW OF SYSTEMS:  Pertinent positive symptoms are:  [place] Sleepiness Scale of Total score: 8   Fatigue  Anxiety      Physical exam:  Vitals:    08/26/24 1100   BP: 128/78   BP Location: Left arm   Patient Position: Sitting   Pulse: 62   SpO2: 96%   Weight: 89.1 kg (196 lb 6.4 oz)   Height: 157.5 cm (62\")    Body mass index is 35.92 kg/m². Neck Circumference: 15 inches  HEENT: Head is atraumatic, normocephalic  Eyes: pupils are round equal and reacting to light and accommodation, conjunctiva normal  Throat: tongue normal  NECK:Neck Circumference: 15 inches  RESPIRATORY SYSTEM: Regular respirations  CARDIOVASULAR SYSTEM: Regular rate  EXTREMITES: No cyanosis, clubbing  NEUROLOGICAL SYSTEM: Oriented x 3, no gross motor defects, gait normal      Impression:  1. Sleep apnea, unspecified type    2. Hypersomnia    3. Non-restorative sleep    4. NSTEMI (non-ST elevated myocardial infarction)    5. Insomnia, unspecified type    6. Obesity (BMI 30-39.9)        Plan:    Office note(s) from care team reviewed. Office note(s) reviewed: 6/12/2020 for primary " care    Labs/ Test Results Reviewed:      N/A          ASSESSMENT AND PLAN:   At risk for sleep apnea: patient's symptoms and physical examination are concerning for possible sleep apnea.   I discussed the signs, symptoms, and pathophysiology of sleep apnea with this patient.  I also discussed the potential complications of untreated sleep apnea including but not limited to resistant hypertension, insulin resistance, pulmonary hypertension, atrial fibrillation, heart attack, stroke, nonrestorative sleep with hypersomnia which can increase risk for motor vehicle accidents, etc.   Different testing methods including home-based and lab based sleep studies were discussed with this patient.   Based on patient history and physical examination, will proceed with home sleep study.  The order for the sleep study is placed in [employer].  The test will be scheduled after prior authorization has been obtained through patient's insurance.  Treatment and management will be discussed in more detail with this patient after the test is completed.  All questions were answered to patient's satisfaction.   Snoring: snoring is the sound created by turbulent airflow vibrating upper airway soft tissue.  I have also discussed factors affecting snoring including sleep deprivation, sleeping on the back and alcohol ingestion. To minimize snoring, patient is advised to have adequate sleep, sleep on their side, and avoid alcohol and sedative medications around bedtime.   Excessive daytime sleepiness:  [place] Sleepiness Scale of Total score: 8.  There are many causes of excessive daytime sleepiness.  Rule out sleep apnea as a contributing factor, as above.  Do not drive, operate heavy machinery, or do activities that require high concentration if feeling tired/drowsy.  Obesity: Body mass index is 35.92 kg/m².. Patients who are overweight or obese are at increased risk of sleep apnea/ sleep disordered breathing. Weight reduction and healthy lifestyle  are encouraged in overweight/ obese patients as part of a comprehensive approach to sleep apnea treatment.    Insomnia: If negative HST refer for CBT-I    I have also discussed with the patient the following  Sleep hygiene: try to maintain a regular bed time and wake time, avoid watching TV/ using electronic devices in bed (including cell phones), limit caffeinated and alcoholic beverages before bed, try to maintain a cool and quiet sleep environment, avoid daytime naps  Adequate amount of sleep: most people need around 7 to 8 hours of sleep each night      Patient will follow-up after study, 31 to 90 days after PAP therapy initiated if applicable, or contact the office sooner for questions or concerns. Patient's questions were answered.      Thank you for allowing me to participate in your patient's care.    Fady Fernandez MD  Sleep Medicine  08/26/24  11:56 EDT

## 2024-08-27 DIAGNOSIS — G47.10 HYPERSOMNIA: ICD-10-CM

## 2024-08-27 DIAGNOSIS — E66.9 OBESITY (BMI 30-39.9): ICD-10-CM

## 2024-08-27 DIAGNOSIS — G47.30 SLEEP APNEA, UNSPECIFIED TYPE: Primary | ICD-10-CM

## 2024-08-27 DIAGNOSIS — G47.00 INSOMNIA, UNSPECIFIED TYPE: ICD-10-CM

## 2024-08-27 DIAGNOSIS — I21.4 NSTEMI (NON-ST ELEVATED MYOCARDIAL INFARCTION): ICD-10-CM

## 2024-08-27 DIAGNOSIS — G47.8 NON-RESTORATIVE SLEEP: ICD-10-CM

## 2024-10-21 DIAGNOSIS — G47.30 SLEEP APNEA, UNSPECIFIED TYPE: Primary | ICD-10-CM

## 2024-10-21 DIAGNOSIS — I21.4 NSTEMI (NON-ST ELEVATED MYOCARDIAL INFARCTION): ICD-10-CM

## 2024-10-21 DIAGNOSIS — G47.8 NON-RESTORATIVE SLEEP: ICD-10-CM

## 2024-10-21 DIAGNOSIS — G47.10 HYPERSOMNIA: ICD-10-CM

## 2024-10-21 DIAGNOSIS — G47.00 INSOMNIA, UNSPECIFIED TYPE: ICD-10-CM

## 2024-10-21 DIAGNOSIS — E66.9 OBESITY (BMI 30-39.9): ICD-10-CM

## 2024-12-03 ENCOUNTER — OUTSIDE FACILITY SERVICE (OUTPATIENT)
Dept: CARDIOLOGY | Facility: CLINIC | Age: 64
End: 2024-12-03
Payer: COMMERCIAL

## 2024-12-04 ENCOUNTER — TELEPHONE (OUTPATIENT)
Dept: FAMILY MEDICINE CLINIC | Facility: CLINIC | Age: 64
End: 2024-12-04
Payer: COMMERCIAL

## 2024-12-04 DIAGNOSIS — E55.9 VITAMIN D DEFICIENCY: Primary | ICD-10-CM

## 2024-12-04 RX ORDER — CHOLECALCIFEROL (VITAMIN D3) 25 MCG
1000 TABLET ORAL DAILY
Qty: 90 TABLET | Refills: 3 | Status: SHIPPED | OUTPATIENT
Start: 2024-12-04

## 2024-12-04 NOTE — TELEPHONE ENCOUNTER
Caller: AndieSara    Relationship: Self    Best call back number: [phone] (Mobile)     What medication are you requesting: VITAMIN D    What are your current symptoms: LOW VITAMIN D LEVELS PER CARDIOLOGIST     How long have you been experiencing symptoms:      Have you had these symptoms before:    [] Yes  [] No    Have you been treated for these symptoms before:   [] Yes  [] No    If a prescription is needed, what is your preferred pharmacy and phone number:  [facility]. [facility] [place] [address] - [phone]  - [phone] FX     Additional notes: PATIENT CALLED TO ADVISE THAT HER CARDIOLOGIST STATES THAT HER VITAMIN D LEVELS ARE LOW, AND TO ASK DR. MENENDEZ TO PRESCRIBE VITAMIN D.         THANKS

## 2024-12-04 NOTE — TELEPHONE ENCOUNTER
NO ANSWER, [facility] [place]    HUB RELAY    Please tell her that I sent a prescription for vitamin D to [facility] pharmacy in [place].  It is unlikely her insurance will pay for it.  Vitamin D is readily available over-the-counter.  If they do not give her what I sent a prescription in for, she should just get over-the-counter vitamin D3 1000 unit capsules and take 1/day.  Thanks!

## 2024-12-04 NOTE — TELEPHONE ENCOUNTER
Name: Sara Chaves      Relationship: Self      Best Callback Number: [phone]      HUB PROVIDED THE RELAY MESSAGE FROM THE OFFICE      PATIENT: VOICED UNDERSTANDING AND HAS NO FURTHER QUESTIONS AT THIS TIME    ADDITIONAL INFORMATION:

## 2024-12-04 NOTE — TELEPHONE ENCOUNTER
Please tell her that I sent a prescription for vitamin D to [facility] pharmacy in [place].  It is unlikely her insurance will pay for it.  Vitamin D is readily available over-the-counter.  If they do not give her what I sent a prescription in for, she should just get over-the-counter vitamin D3 1000 unit capsules and take 1/day.  Thanks!

## 2025-03-28 ENCOUNTER — TELEPHONE (OUTPATIENT)
Dept: FAMILY MEDICINE CLINIC | Facility: CLINIC | Age: 65
End: 2025-03-28
Payer: COMMERCIAL

## 2025-05-03 RX ORDER — AMLODIPINE BESYLATE 5 MG/1
TABLET ORAL
Qty: 90 TABLET | Refills: 0 | Status: SHIPPED | OUTPATIENT
Start: 2025-05-03

## 2025-05-03 RX ORDER — PRAVASTATIN SODIUM 80 MG/1
TABLET ORAL
Qty: 90 TABLET | Refills: 0 | Status: SHIPPED | OUTPATIENT
Start: 2025-05-03

## 2025-05-03 RX ORDER — CLOPIDOGREL BISULFATE 75 MG/1
TABLET ORAL
Qty: 90 TABLET | Refills: 0 | Status: SHIPPED | OUTPATIENT
Start: 2025-05-03

## 2025-05-03 RX ORDER — EZETIMIBE 10 MG/1
TABLET ORAL
Qty: 90 TABLET | Refills: 0 | Status: SHIPPED | OUTPATIENT
Start: 2025-05-03

## 2025-05-03 RX ORDER — LOSARTAN POTASSIUM 100 MG/1
TABLET ORAL
Qty: 90 TABLET | Refills: 0 | Status: SHIPPED | OUTPATIENT
Start: 2025-05-03

## 2025-05-03 RX ORDER — METOPROLOL TARTRATE 25 MG/1
TABLET, FILM COATED ORAL
Qty: 90 TABLET | Refills: 0 | Status: SHIPPED | OUTPATIENT
Start: 2025-05-03

## 2025-05-20 ENCOUNTER — OFFICE VISIT (OUTPATIENT)
Dept: FAMILY MEDICINE CLINIC | Facility: CLINIC | Age: 65
End: 2025-05-20
Payer: COMMERCIAL

## 2025-05-20 VITALS
DIASTOLIC BLOOD PRESSURE: 84 MMHG | HEIGHT: 62 IN | RESPIRATION RATE: 18 BRPM | WEIGHT: 202 LBS | BODY MASS INDEX: 37.17 KG/M2 | HEART RATE: 64 BPM | OXYGEN SATURATION: 94 % | TEMPERATURE: 97.5 F | SYSTOLIC BLOOD PRESSURE: 134 MMHG

## 2025-05-20 DIAGNOSIS — E78.2 MIXED HYPERLIPIDEMIA: ICD-10-CM

## 2025-05-20 DIAGNOSIS — F41.1 GENERALIZED ANXIETY DISORDER: ICD-10-CM

## 2025-05-20 DIAGNOSIS — E66.01 CLASS 2 SEVERE OBESITY DUE TO EXCESS CALORIES WITH SERIOUS COMORBIDITY AND BODY MASS INDEX (BMI) OF 36.0 TO 36.9 IN ADULT: ICD-10-CM

## 2025-05-20 DIAGNOSIS — I10 BENIGN HYPERTENSION: Primary | ICD-10-CM

## 2025-05-20 DIAGNOSIS — E55.9 VITAMIN D DEFICIENCY: ICD-10-CM

## 2025-05-20 DIAGNOSIS — Z95.1 S/P CABG X 4: ICD-10-CM

## 2025-05-20 DIAGNOSIS — I25.110 CORONARY ARTERY DISEASE INVOLVING NATIVE CORONARY ARTERY OF NATIVE HEART WITH UNSTABLE ANGINA PECTORIS: ICD-10-CM

## 2025-05-20 DIAGNOSIS — E66.812 CLASS 2 SEVERE OBESITY DUE TO EXCESS CALORIES WITH SERIOUS COMORBIDITY AND BODY MASS INDEX (BMI) OF 36.0 TO 36.9 IN ADULT: ICD-10-CM

## 2025-05-20 PROCEDURE — 99214 OFFICE O/P EST MOD 30 MIN: CPT | Performed by: FAMILY MEDICINE

## 2025-05-20 RX ORDER — PAROXETINE 20 MG/1
20 TABLET, FILM COATED ORAL DAILY
Qty: 90 TABLET | Refills: 3 | Status: SHIPPED | OUTPATIENT
Start: 2025-05-20

## 2025-05-20 RX ORDER — EZETIMIBE 10 MG/1
10 TABLET ORAL DAILY
Qty: 90 TABLET | Refills: 3 | Status: SHIPPED | OUTPATIENT
Start: 2025-05-20

## 2025-05-20 RX ORDER — CLOPIDOGREL BISULFATE 75 MG/1
75 TABLET ORAL DAILY
Qty: 90 TABLET | Refills: 3 | Status: SHIPPED | OUTPATIENT
Start: 2025-05-20

## 2025-05-20 RX ORDER — METOPROLOL TARTRATE 25 MG/1
12.5 TABLET, FILM COATED ORAL 2 TIMES DAILY
Qty: 90 TABLET | Refills: 3 | Status: SHIPPED | OUTPATIENT
Start: 2025-05-20

## 2025-05-20 RX ORDER — AMLODIPINE BESYLATE 5 MG/1
5 TABLET ORAL DAILY
Qty: 90 TABLET | Refills: 3 | Status: SHIPPED | OUTPATIENT
Start: 2025-05-20

## 2025-05-20 RX ORDER — PRAVASTATIN SODIUM 80 MG/1
80 TABLET ORAL NIGHTLY
Qty: 90 TABLET | Refills: 3 | Status: SHIPPED | OUTPATIENT
Start: 2025-05-20

## 2025-05-20 RX ORDER — CHOLECALCIFEROL (VITAMIN D3) 25 MCG
3000 TABLET ORAL DAILY
Qty: 270 TABLET | Refills: 3 | Status: SHIPPED | OUTPATIENT
Start: 2025-05-20

## 2025-05-20 RX ORDER — LOSARTAN POTASSIUM 100 MG/1
100 TABLET ORAL DAILY
Qty: 90 TABLET | Refills: 3 | Status: SHIPPED | OUTPATIENT
Start: 2025-05-20

## 2025-05-20 NOTE — PROGRESS NOTES
Subjective   Sara Chaves is a 64 y.o. female.   Chief Complaint   Patient presents with    Hypertension    Hyperlipidemia    Obesity       History of Present Illness   64 y.o. female presents the office today for what the schedule says is follow-up on hypertension.  I last saw her back in June 2024.  Lost follow-up after that.  Looks like she was in the ER in [place] for chest pain exactly 1 month ago.    She had lipids and other labs from Dr. Tapia in November 2024.    Blood pressure today is excellent at 134/84.  She indicates today she also wants to talk about her weight.      History of Present Illness  The patient presents for weight gain and vitamin D deficiency.    She reports a significant weight gain since her custodial, which she attributes to the onset of menopause. Despite her efforts to maintain a healthy diet, she finds it challenging to lose weight. She expresses a desire to understand her daily caloric limit and how to effectively monitor her intake. She also mentions a decrease in energy levels, which she believes may be related to her weight gain.    She has been diagnosed with vitamin D deficiency and is currently on a daily supplement regimen. Her last vitamin D level was 23.5 in 11/2024.    Denies any side effects or problems with current medications.  No chest pain, shortness of breath, PND, orthopnea.  Has follow-up with her cardiologist in a few weeks.      Patient Active Problem List    Diagnosis Date Noted    Vitamin D deficiency 12/04/2024     Note Last Updated: 12/4/2024     Diagnosed on labs in December 2024 by her cardiologist      Class 2 severe obesity due to excess calories with serious comorbidity and body mass index (BMI) of 36.0 to 36.9 in adult 03/21/2024    Anxiety disorder 08/22/2023     Note Last Updated: 8/22/2023     On Paxil for years - helps      Hyperlipidemia 08/22/2023    Benign hypertension 08/22/2023    Acute congestive heart failure with left ventricular diastolic  dysfunction 08/22/2023    S/P CABG x 4 08/22/2023     Note Last Updated: 8/22/2023 8/11/23      Coronary artery disease involving native coronary artery of native heart with unstable angina pectoris 08/09/2023     Note Last Updated: 8/9/2023     Added automatically from request for surgery [id number]      NSTEMI (non-ST elevated myocardial infarction) 08/08/2023           Past Surgical History:   Procedure Laterality Date    CARDIAC SURGERY      CORONARY ARTERY BYPASS GRAFT N/A 08/11/2023    Procedure: CORONARY ARTERY BYPASS GRAFTING;  Surgeon: Noe Clements MD;  Location: [facility][1];  Service: Cardiothoracic;  Laterality: N/A;  CABG X4 including HOPPER    KNEE ACL RECONSTRUCTION Right      Current Outpatient Medications on File Prior to Visit   Medication Sig    ASPIRIN 81 PO Take 81 mg by mouth Daily.    Omega-3 Fatty Acids (fish oil) 1000 MG capsule capsule Take 1 capsule by mouth Every Night.    [DISCONTINUED] amLODIPine (NORVASC) 5 MG tablet TAKE ONE TABLET BY MOUTH DAILY (B)    [DISCONTINUED] cholecalciferol (Vitamin D, Cholecalciferol,) 25 MCG (1000 UT) tablet Take 1 tablet by mouth Daily.    [DISCONTINUED] clopidogrel (PLAVIX) 75 MG tablet TAKE ONE TABLET BY MOUTH DAILY (M)    [DISCONTINUED] ezetimibe (ZETIA) 10 MG tablet TAKE ONE TABLET BY MOUTH DAILY (M)    [DISCONTINUED] losartan (COZAAR) 100 MG tablet TAKE ONE TABLET BY MOUTH DAILY (M)    [DISCONTINUED] metoprolol tartrate (LOPRESSOR) 25 MG tablet TAKE 1/2 TABLET BY MOUTH TWICE DAILY (MB)    [DISCONTINUED] PARoxetine (PAXIL) 20 MG tablet TAKE ONE TABLET BY MOUTH DAILY    [DISCONTINUED] pravastatin (PRAVACHOL) 80 MG tablet TAKE ONE TABLET BY MOUTH DAILY (B)    [DISCONTINUED] varenicline (CHANTIX) 0.5 MG tablet TAKE ONE TABLET BY MOUTH ONCE DAILY FOR 3 DAYS, THEN TAKE ONE TABLET TWICE DAILY FOR 4 DAYS, THEN GO TO MAINTANCE DOSE    [DISCONTINUED] varenicline (CHANTIX) 1 MG tablet TAKE ONE TABLET BY MOUTH TWICE DAILY AFTER COMPLETING STARTING TAPER  "   [DISCONTINUED] zolpidem (Ambien) 5 MG tablet Take 1/2 tab as needed for sleep at night of sleep study only. Do not use at home.     No current facility-administered medications on file prior to visit.     No Known Allergies  Social History     Socioeconomic History    Marital status:    Tobacco Use    Smoking status: Every Day     Current packs/day: 0.50     Average packs/day: 1 pack/day for 29.7 years (28.6 ttl pk-yrs)     Types: Cigarettes     Start date: 8/18/1995     Last attempt to quit: 8/13/2024     Passive exposure: Current    Smokeless tobacco: Never   Vaping Use    Vaping status: Never Used   Substance and Sexual Activity    Alcohol use: Never    Drug use: Never    Sexual activity: Yes     Partners: Male     Birth control/protection: Post-menopausal, Vasectomy     Family History   Problem Relation Age of Onset    Hypertension Mother     Breast cancer Mother     Hypertension Father     Heart disease Father     Sleep apnea Brother        Review of Systems    Objective   /84 (BP Location: Left arm, Patient Position: Sitting, Cuff Size: Large Adult)   Pulse 64   Temp 97.5 °F (36.4 °C) (Infrared)   Resp 18   Ht 157.5 cm (62\")   Wt 91.6 kg (202 lb)   LMP  (LMP Unknown)   SpO2 94%   Breastfeeding No   BMI 36.95 kg/m²   Physical Exam  Constitutional:       Appearance: She is well-developed. She is obese.      Comments:      HENT:      Head: Normocephalic and atraumatic.   Eyes:      Conjunctiva/sclera: Conjunctivae normal.   Cardiovascular:      Rate and Rhythm: Normal rate.   Pulmonary:      Effort: Pulmonary effort is normal.   Musculoskeletal:         General: Normal range of motion.      Cervical back: Normal range of motion.   Skin:     General: Skin is warm and dry.      Findings: No rash.   Neurological:      Mental Status: She is alert and oriented to person, place, and time.   Psychiatric:         Behavior: Behavior normal.       Physical Exam          Results  Labs   - Vitamin D " level: 11/2024, 23.5          Assessment & Plan   Diagnoses and all orders for this visit:    1. Benign hypertension (Primary)  -     amLODIPine (NORVASC) 5 MG tablet; Take 1 tablet by mouth Daily.  Dispense: 90 tablet; Refill: 3  -     losartan (COZAAR) 100 MG tablet; Take 1 tablet by mouth Daily.  Dispense: 90 tablet; Refill: 3  -     metoprolol tartrate (LOPRESSOR) 25 MG tablet; Take 0.5 tablets by mouth 2 (Two) Times a Day.  Dispense: 90 tablet; Refill: 3    2. Coronary artery disease involving native coronary artery of native heart with unstable angina pectoris  -     clopidogrel (PLAVIX) 75 MG tablet; Take 1 tablet by mouth Daily.  Dispense: 90 tablet; Refill: 3  -     metoprolol tartrate (LOPRESSOR) 25 MG tablet; Take 0.5 tablets by mouth 2 (Two) Times a Day.  Dispense: 90 tablet; Refill: 3    3. S/P CABG x 4    4. Vitamin D deficiency  -     cholecalciferol (Vitamin D, Cholecalciferol,) 25 MCG (1000 UT) tablet; Take 3 tablets by mouth Daily.  Dispense: 270 tablet; Refill: 3    5. Class 2 severe obesity due to excess calories with serious comorbidity and body mass index (BMI) of 36.0 to 36.9 in adult    6. Generalized anxiety disorder  -     PARoxetine (PAXIL) 20 MG tablet; Take 1 tablet by mouth Daily.  Dispense: 90 tablet; Refill: 3    7. Mixed hyperlipidemia  -     ezetimibe (ZETIA) 10 MG tablet; Take 1 tablet by mouth Daily.  Dispense: 90 tablet; Refill: 3  -     pravastatin (PRAVACHOL) 80 MG tablet; Take 1 tablet by mouth Every Night.  Dispense: 90 tablet; Refill: 3      Assessment & Plan  1. Weight gain.  - She has been experiencing weight gain since menopause and finds it difficult to lose weight.  - Her blood pressure is within normal range at 134/84.  - She was advised to download a free version of the Lose It gerson to track her caloric intake. A daily caloric limit of 1500 calories was recommended, with the suggestion to divide this into three meals of approximately 500 calories each.  - She was  encouraged to engage in regular physical activity, such as walking 2 miles three to four times per week, and to gradually increase the intensity of her workouts over time. The importance of patience in the weight loss process was emphasized. She was also advised to weigh herself daily and record her weight weekly.    2. Vitamin D deficiency.  - Her vitamin D level was 23.5 six months ago.  - She is currently taking 1000 units of vitamin D daily.  - The dosage of vitamin D will be increased to 3000 units daily. A new prescription for vitamin D will be sent to her pharmacy.  - Blood work, including a vitamin D level check, will be planned for her next visit in 6 months.    Hypertensions under good control.  Continue amlodipine, losartan, metoprolol at current doses.  Keep follow-up with cardiology per their recommendations.  Additional refills were provided for her medications.      Follow-up  The patient will follow up in 6 months.        Call with any problems or concerns before next visit       Return in about 6 months (around 11/20/2025).  Patient or patient representative verbalized consent for the use of Ambient Listening during the visit with  Teetee Mckay MD for chart documentation. 5/20/2025  10:39 EDT    Part of this note may be an electronic transcription/translation of spoken language to printed text using the Dragon Dictation System    Teetee Mckay MD5/20/202510:39 EDT  This note has been electronically signed

## 2025-07-16 ENCOUNTER — TELEPHONE (OUTPATIENT)
Dept: CARDIOLOGY | Facility: CLINIC | Age: 65
End: 2025-07-16

## 2025-07-28 DIAGNOSIS — I25.110 CORONARY ARTERY DISEASE INVOLVING NATIVE CORONARY ARTERY OF NATIVE HEART WITH UNSTABLE ANGINA PECTORIS: ICD-10-CM

## 2025-07-28 DIAGNOSIS — I10 BENIGN HYPERTENSION: ICD-10-CM

## 2025-07-28 DIAGNOSIS — E78.2 MIXED HYPERLIPIDEMIA: ICD-10-CM

## 2025-07-28 DIAGNOSIS — F41.1 GENERALIZED ANXIETY DISORDER: ICD-10-CM

## 2025-07-29 RX ORDER — METOPROLOL TARTRATE 25 MG/1
TABLET, FILM COATED ORAL
Qty: 90 TABLET | Refills: 3 | Status: SHIPPED | OUTPATIENT
Start: 2025-07-29

## 2025-07-29 RX ORDER — PAROXETINE 20 MG/1
TABLET, FILM COATED ORAL
Qty: 90 TABLET | Refills: 3 | Status: SHIPPED | OUTPATIENT
Start: 2025-07-29

## 2025-07-29 RX ORDER — AMLODIPINE BESYLATE 5 MG/1
TABLET ORAL
Qty: 90 TABLET | Refills: 3 | Status: SHIPPED | OUTPATIENT
Start: 2025-07-29

## 2025-07-29 RX ORDER — EZETIMIBE 10 MG/1
TABLET ORAL
Qty: 90 TABLET | Refills: 3 | Status: SHIPPED | OUTPATIENT
Start: 2025-07-29

## 2025-07-29 RX ORDER — CLOPIDOGREL BISULFATE 75 MG/1
TABLET ORAL
Qty: 90 TABLET | Refills: 3 | Status: SHIPPED | OUTPATIENT
Start: 2025-07-29

## 2025-07-29 RX ORDER — LOSARTAN POTASSIUM 100 MG/1
TABLET ORAL
Qty: 90 TABLET | Refills: 3 | Status: SHIPPED | OUTPATIENT
Start: 2025-07-29

## 2025-07-29 RX ORDER — PRAVASTATIN SODIUM 80 MG/1
TABLET ORAL
Qty: 90 TABLET | Refills: 3 | Status: SHIPPED | OUTPATIENT
Start: 2025-07-29

## (undated) DEVICE — SUT PROLN 3/0 V7 D/A 36IN 8976H

## (undated) DEVICE — SUT SILK 2/0 SH CR8 18IN CR8 C012D

## (undated) DEVICE — BNDG ELAS ELITE V/CLOSE 6IN 5YD LF STRL

## (undated) DEVICE — TBG INSUFF MALE L/L W 12MM CON: Brand: MEDLINE INDUSTRIES, INC.

## (undated) DEVICE — CATH TDILUT SWANGANZ VIP 7.5F 110CM

## (undated) DEVICE — SOL IRR NACL 0.9PCT BT 1000ML

## (undated) DEVICE — ROTATING SURGICAL PUNCHES, 1 PER POUCH: Brand: A&E MEDICAL / ROTATING SURGICAL PUNCHES

## (undated) DEVICE — PRESSURE MONITORING SET: Brand: TRUWAVE, VAMP PLUS

## (undated) DEVICE — BLD SCLPL BEAVR MINI STR 2BVL 180D LF

## (undated) DEVICE — Device

## (undated) DEVICE — BLOOD TRANSFUSION FILTER: Brand: HAEMONETICS

## (undated) DEVICE — SYR LL TP 10ML STRL

## (undated) DEVICE — SUT SILK 0 CT1 CR8 18IN C021D

## (undated) DEVICE — SUP ARMBRD HANDAID ART/LINE 9IN

## (undated) DEVICE — SUT SILK 4/0 TIES 18IN A183H

## (undated) DEVICE — GLV SURG BIOGEL LTX PF 7 1/2

## (undated) DEVICE — 3M(TM) TEGADERM(TM) IV ADAVANCED SECUREMENT DRESSING 1685: Brand: 3M™ TEGADERM™

## (undated) DEVICE — BLOWER MISTER CLEARVIEW W/TBG

## (undated) DEVICE — SUT PROLN 4/0 V5 36IN 8935H

## (undated) DEVICE — SAFELINER OUTER SHELL SUCTION CANISTER: Brand: DEROYAL

## (undated) DEVICE — SUT PROLN 6/0 RB2 D/A 30IN 8711H

## (undated) DEVICE — SENSR CERBRL O2 PK/2

## (undated) DEVICE — CANN AORT SARNS SFT/FLW VNTD 21F7MM

## (undated) DEVICE — SUT PDS 0 CT-1 Z340H

## (undated) DEVICE — 500ML,PRESSURE INFUSER W/STOPCOCK: Brand: MEDLINE

## (undated) DEVICE — SOL IRR H2O BTL 1000ML STRL

## (undated) DEVICE — SOL NACL 0.9PCT 1000ML

## (undated) DEVICE — SUT PROLN 4/0 V7 36IN 8975H

## (undated) DEVICE — ELECTRD DEFIB M/FUNC PROPADZ STRL 2PK

## (undated) DEVICE — KT CATH CV ACC MAC 2L SFTY 9F 4 1/2IN

## (undated) DEVICE — CORONARY ARTERY BYPASS GRAFT MARKERS, STAINLESS STEEL, DISTAL, WITHOUT HOLDER: Brand: ANASTOMARK CORONARY ARTERY BYPASS GRAFT MARKERS, STAINLESS STEEL, DISTAL

## (undated) DEVICE — CANN VESL FREE FLO BLNT TP 3MM

## (undated) DEVICE — SCANLAN® VASCU-STATT® II SINGLE-USE BULLDOG CLAMP W/FIRMER CLAMPING PRESS - MIDI ANGLED 45° (YELLOW), CLAMPING PRESSURE 75-80 G (2/STERILE PKG): Brand: SCANLAN® VASCU-STATT® II SINGLE-USE BULLDOG CLAMP W/FIRMER CLAMPING PRESS

## (undated) DEVICE — ST IV BLD W/HANDPUMP YSITE 125IN

## (undated) DEVICE — SYS PERFUS SEP PLATLT W TIPS CUST

## (undated) DEVICE — PK OPN HEART WHT WRP 50

## (undated) DEVICE — PK PERFUS CUST W/CARDIOPLEGIA

## (undated) DEVICE — TUBING, SUCTION, 1/4" X 12', STRAIGHT: Brand: MEDLINE

## (undated) DEVICE — HEMOCONCENTRATOR PERFUS LPS06

## (undated) DEVICE — SPNG GZ AVANT 6PLY 4X4IN STRL PK/2

## (undated) DEVICE — PRESSURE TUBING: Brand: TRUWAVE

## (undated) DEVICE — SUT PROLN 5/0 V5 36IN 8934H

## (undated) DEVICE — 3M™ TEGADERM™ IV TRANSPARENT FILM DRESSING WITH BORDER 100 BAGS/CARTON 4 CARTONS/CASE 1633: Brand: 3M™ TEGADERM™

## (undated) DEVICE — SUT SILK 2/0 TIES 18IN A185H

## (undated) DEVICE — CANN AORT ROOT DLP VNT/8IN 14G 7F

## (undated) DEVICE — BNDG ELAS ELITE V/CLOSE 4IN 5YD LF STRL

## (undated) DEVICE — ELECTRD BLD EZ CLN STD 6.5IN

## (undated) DEVICE — 3M™ IOBAN™ 2 ANTIMICROBIAL INCISE DRAPE 6650EZ: Brand: IOBAN™ 2

## (undated) DEVICE — SUT SILK 2 SUTUPAK TIE 60IN SA8H 2STRAND

## (undated) DEVICE — SUT PROLN 7/0 BV1 D/A 30IN 8703H

## (undated) DEVICE — DRAPE SHEET ULTRAGARD: Brand: MEDLINE

## (undated) DEVICE — ANTIBACTERIAL UNDYED BRAIDED (POLYGLACTIN 910), SYNTHETIC ABSORBABLE SUTURE: Brand: COATED VICRYL

## (undated) DEVICE — DRN WND CH RND FUL/FLUT NO/TROC 3/8IN 28F

## (undated) DEVICE — PK ATS CUST W CARDIOTOMY RESEVOIR

## (undated) DEVICE — 28 FR RIGHT ANGLE – SOFT PVC CATHETER: Brand: PVC THORACIC CATHETERS

## (undated) DEVICE — CONNECT Y INTERSEPT W/LL 3/8 X 3/8 X 3/8IN

## (undated) DEVICE — SYS VASOVIEW HEMOPRO ENDOSCOPIC HARVST VESL

## (undated) DEVICE — BG BLD SYS

## (undated) DEVICE — BIOPATCH™ ANTIMICROBIAL DRESSING WITH CHLORHEXIDINE GLUCONATE IS A HYDROPHILLIC POLYURETHANE ABSORPTIVE FOAM WITH CHLORHEXIDINE GLUCONATE (CHG) WHICH INHIBITS BACTERIAL GROWTH UNDER THE DRESSING. THE DRESSING IS INTENDED TO BE USED TO ABSORB EXUDATE, COVER A WOUND CAUSED BY VASCULAR AND NONVASCULAR PERCUTANEOUS MEDICAL DEVICES DURING SURGERY, AS WELL AS REDUCE LOCAL INFECTION AND COLONIZATION OF MICROORGANISMS.: Brand: BIOPATCH

## (undated) DEVICE — CATH ART RADL 20GA 1 3/4IN LF

## (undated) DEVICE — CABL BIPOL W/ALLGTR CLIP/SM 12FT